# Patient Record
Sex: FEMALE | Race: WHITE | NOT HISPANIC OR LATINO | URBAN - METROPOLITAN AREA
[De-identification: names, ages, dates, MRNs, and addresses within clinical notes are randomized per-mention and may not be internally consistent; named-entity substitution may affect disease eponyms.]

---

## 2018-08-22 ENCOUNTER — OUTPATIENT (OUTPATIENT)
Dept: OUTPATIENT SERVICES | Facility: HOSPITAL | Age: 78
LOS: 1 days | Discharge: HOME | End: 2018-08-22

## 2018-08-22 DIAGNOSIS — M06.4 INFLAMMATORY POLYARTHROPATHY: ICD-10-CM

## 2018-08-22 DIAGNOSIS — Z96.643 PRESENCE OF ARTIFICIAL HIP JOINT, BILATERAL: ICD-10-CM

## 2018-08-22 DIAGNOSIS — M06.9 RHEUMATOID ARTHRITIS, UNSPECIFIED: ICD-10-CM

## 2018-08-22 DIAGNOSIS — K76.9 LIVER DISEASE, UNSPECIFIED: ICD-10-CM

## 2018-08-24 DIAGNOSIS — Z02.9 ENCOUNTER FOR ADMINISTRATIVE EXAMINATIONS, UNSPECIFIED: ICD-10-CM

## 2018-08-24 DIAGNOSIS — M06.4 INFLAMMATORY POLYARTHROPATHY: ICD-10-CM

## 2018-08-24 DIAGNOSIS — M06.9 RHEUMATOID ARTHRITIS, UNSPECIFIED: ICD-10-CM

## 2018-08-24 DIAGNOSIS — K76.9 LIVER DISEASE, UNSPECIFIED: ICD-10-CM

## 2018-12-05 ENCOUNTER — OUTPATIENT (OUTPATIENT)
Dept: OUTPATIENT SERVICES | Facility: HOSPITAL | Age: 78
LOS: 1 days | Discharge: HOME | End: 2018-12-05

## 2018-12-05 DIAGNOSIS — M06.4 INFLAMMATORY POLYARTHROPATHY: ICD-10-CM

## 2018-12-05 DIAGNOSIS — E78.2 MIXED HYPERLIPIDEMIA: ICD-10-CM

## 2018-12-05 DIAGNOSIS — D50.8 OTHER IRON DEFICIENCY ANEMIAS: ICD-10-CM

## 2019-03-19 ENCOUNTER — OUTPATIENT (OUTPATIENT)
Dept: OUTPATIENT SERVICES | Facility: HOSPITAL | Age: 79
LOS: 1 days | Discharge: HOME | End: 2019-03-19

## 2019-03-19 DIAGNOSIS — E78.2 MIXED HYPERLIPIDEMIA: ICD-10-CM

## 2019-03-19 DIAGNOSIS — M06.4 INFLAMMATORY POLYARTHROPATHY: ICD-10-CM

## 2019-03-19 DIAGNOSIS — D50.8 OTHER IRON DEFICIENCY ANEMIAS: ICD-10-CM

## 2019-06-12 ENCOUNTER — OUTPATIENT (OUTPATIENT)
Dept: OUTPATIENT SERVICES | Facility: HOSPITAL | Age: 79
LOS: 1 days | Discharge: HOME | End: 2019-06-12

## 2019-06-12 DIAGNOSIS — M06.4 INFLAMMATORY POLYARTHROPATHY: ICD-10-CM

## 2019-06-12 DIAGNOSIS — E78.2 MIXED HYPERLIPIDEMIA: ICD-10-CM

## 2019-06-12 DIAGNOSIS — D50.8 OTHER IRON DEFICIENCY ANEMIAS: ICD-10-CM

## 2020-05-16 ENCOUNTER — INPATIENT (INPATIENT)
Facility: HOSPITAL | Age: 80
LOS: 3 days | Discharge: HOME HEALTH | End: 2020-05-20
Attending: INTERNAL MEDICINE | Admitting: INTERNAL MEDICINE
Payer: MEDICARE

## 2020-05-16 VITALS
OXYGEN SATURATION: 98 % | HEART RATE: 99 BPM | RESPIRATION RATE: 29 BRPM | DIASTOLIC BLOOD PRESSURE: 58 MMHG | SYSTOLIC BLOOD PRESSURE: 90 MMHG | TEMPERATURE: 98 F

## 2020-05-16 DIAGNOSIS — Z96.659 PRESENCE OF UNSPECIFIED ARTIFICIAL KNEE JOINT: Chronic | ICD-10-CM

## 2020-05-16 DIAGNOSIS — Z96.649 PRESENCE OF UNSPECIFIED ARTIFICIAL HIP JOINT: Chronic | ICD-10-CM

## 2020-05-16 DIAGNOSIS — R09.89 OTHER SPECIFIED SYMPTOMS AND SIGNS INVOLVING THE CIRCULATORY AND RESPIRATORY SYSTEMS: ICD-10-CM

## 2020-05-16 DIAGNOSIS — I26.99 OTHER PULMONARY EMBOLISM WITHOUT ACUTE COR PULMONALE: ICD-10-CM

## 2020-05-16 LAB
ALBUMIN SERPL ELPH-MCNC: 3.7 G/DL — SIGNIFICANT CHANGE UP (ref 3.5–5.2)
ALP SERPL-CCNC: 77 U/L — SIGNIFICANT CHANGE UP (ref 30–115)
ALT FLD-CCNC: 12 U/L — SIGNIFICANT CHANGE UP (ref 0–41)
ANION GAP SERPL CALC-SCNC: 22 MMOL/L — HIGH (ref 7–14)
APTT BLD: 104 SEC — CRITICAL HIGH (ref 27–39.2)
AST SERPL-CCNC: 27 U/L — SIGNIFICANT CHANGE UP (ref 0–41)
BASE EXCESS BLDV CALC-SCNC: -3 MMOL/L — LOW (ref -2–2)
BASE EXCESS BLDV CALC-SCNC: -6 MMOL/L — LOW (ref -2–2)
BASOPHILS # BLD AUTO: 0.11 K/UL — SIGNIFICANT CHANGE UP (ref 0–0.2)
BASOPHILS NFR BLD AUTO: 0.6 % — SIGNIFICANT CHANGE UP (ref 0–1)
BILIRUB SERPL-MCNC: 0.6 MG/DL — SIGNIFICANT CHANGE UP (ref 0.2–1.2)
BUN SERPL-MCNC: 36 MG/DL — HIGH (ref 10–20)
CA-I SERPL-SCNC: 0.96 MMOL/L — LOW (ref 1.12–1.3)
CA-I SERPL-SCNC: 1.3 MMOL/L — SIGNIFICANT CHANGE UP (ref 1.12–1.3)
CALCIUM SERPL-MCNC: 10.7 MG/DL — HIGH (ref 8.5–10.1)
CHLORIDE SERPL-SCNC: 95 MMOL/L — LOW (ref 98–110)
CO2 SERPL-SCNC: 18 MMOL/L — SIGNIFICANT CHANGE UP (ref 17–32)
CREAT SERPL-MCNC: 1.6 MG/DL — HIGH (ref 0.7–1.5)
EOSINOPHIL # BLD AUTO: 0.08 K/UL — SIGNIFICANT CHANGE UP (ref 0–0.7)
EOSINOPHIL NFR BLD AUTO: 0.5 % — SIGNIFICANT CHANGE UP (ref 0–8)
GAS PNL BLDV: 131 MMOL/L — LOW (ref 136–145)
GAS PNL BLDV: 138 MMOL/L — SIGNIFICANT CHANGE UP (ref 136–145)
GAS PNL BLDV: SIGNIFICANT CHANGE UP
GAS PNL BLDV: SIGNIFICANT CHANGE UP
GLUCOSE SERPL-MCNC: 221 MG/DL — HIGH (ref 70–99)
HCO3 BLDV-SCNC: 20 MMOL/L — LOW (ref 22–29)
HCO3 BLDV-SCNC: 23 MMOL/L — SIGNIFICANT CHANGE UP (ref 22–29)
HCT VFR BLD CALC: 40.9 % — SIGNIFICANT CHANGE UP (ref 37–47)
HCT VFR BLDA CALC: 42.6 % — SIGNIFICANT CHANGE UP (ref 34–44)
HCT VFR BLDA CALC: 43 % — SIGNIFICANT CHANGE UP (ref 34–44)
HGB BLD CALC-MCNC: 13.9 G/DL — LOW (ref 14–18)
HGB BLD CALC-MCNC: 14 G/DL — SIGNIFICANT CHANGE UP (ref 14–18)
HGB BLD-MCNC: 13.1 G/DL — SIGNIFICANT CHANGE UP (ref 12–16)
HOROWITZ INDEX BLDV+IHG-RTO: 21 — SIGNIFICANT CHANGE UP
HOROWITZ INDEX BLDV+IHG-RTO: 21 — SIGNIFICANT CHANGE UP
IMM GRANULOCYTES NFR BLD AUTO: 1.5 % — HIGH (ref 0.1–0.3)
LACTATE BLDV-MCNC: 2.8 MMOL/L — HIGH (ref 0.5–1.6)
LACTATE BLDV-MCNC: 5.8 MMOL/L — HIGH (ref 0.5–1.6)
LYMPHOCYTES # BLD AUTO: 0.88 K/UL — LOW (ref 1.2–3.4)
LYMPHOCYTES # BLD AUTO: 5.2 % — LOW (ref 20.5–51.1)
MAGNESIUM SERPL-MCNC: 1.6 MG/DL — LOW (ref 1.8–2.4)
MCHC RBC-ENTMCNC: 31.8 PG — HIGH (ref 27–31)
MCHC RBC-ENTMCNC: 32 G/DL — SIGNIFICANT CHANGE UP (ref 32–37)
MCV RBC AUTO: 99.3 FL — HIGH (ref 81–99)
MONOCYTES # BLD AUTO: 1.26 K/UL — HIGH (ref 0.1–0.6)
MONOCYTES NFR BLD AUTO: 7.4 % — SIGNIFICANT CHANGE UP (ref 1.7–9.3)
NEUTROPHILS # BLD AUTO: 14.45 K/UL — HIGH (ref 1.4–6.5)
NEUTROPHILS NFR BLD AUTO: 84.8 % — HIGH (ref 42.2–75.2)
NRBC # BLD: 0 /100 WBCS — SIGNIFICANT CHANGE UP (ref 0–0)
NT-PROBNP SERPL-SCNC: 3162 PG/ML — HIGH (ref 0–300)
PCO2 BLDV: 43 MMHG — SIGNIFICANT CHANGE UP (ref 41–51)
PCO2 BLDV: 46 MMHG — SIGNIFICANT CHANGE UP (ref 41–51)
PH BLDV: 7.29 — SIGNIFICANT CHANGE UP (ref 7.26–7.43)
PH BLDV: 7.31 — SIGNIFICANT CHANGE UP (ref 7.26–7.43)
PLATELET # BLD AUTO: 198 K/UL — SIGNIFICANT CHANGE UP (ref 130–400)
PO2 BLDV: 12 MMHG — LOW (ref 20–40)
PO2 BLDV: 26 MMHG — SIGNIFICANT CHANGE UP (ref 20–40)
POTASSIUM BLDV-SCNC: 3.9 MMOL/L — SIGNIFICANT CHANGE UP (ref 3.3–5.6)
POTASSIUM BLDV-SCNC: 4.2 MMOL/L — SIGNIFICANT CHANGE UP (ref 3.3–5.6)
POTASSIUM SERPL-MCNC: 4.5 MMOL/L — SIGNIFICANT CHANGE UP (ref 3.5–5)
POTASSIUM SERPL-SCNC: 4.5 MMOL/L — SIGNIFICANT CHANGE UP (ref 3.5–5)
PROT SERPL-MCNC: 6.5 G/DL — SIGNIFICANT CHANGE UP (ref 6–8)
RBC # BLD: 4.12 M/UL — LOW (ref 4.2–5.4)
RBC # FLD: 12.4 % — SIGNIFICANT CHANGE UP (ref 11.5–14.5)
SAO2 % BLDV: 11 % — SIGNIFICANT CHANGE UP
SAO2 % BLDV: 39 % — SIGNIFICANT CHANGE UP
SARS-COV-2 RNA SPEC QL NAA+PROBE: SIGNIFICANT CHANGE UP
SODIUM SERPL-SCNC: 135 MMOL/L — SIGNIFICANT CHANGE UP (ref 135–146)
TROPONIN T SERPL-MCNC: 0.1 NG/ML — CRITICAL HIGH
WBC # BLD: 17.04 K/UL — HIGH (ref 4.8–10.8)
WBC # FLD AUTO: 17.04 K/UL — HIGH (ref 4.8–10.8)

## 2020-05-16 PROCEDURE — 93306 TTE W/DOPPLER COMPLETE: CPT | Mod: 26

## 2020-05-16 PROCEDURE — 71275 CT ANGIOGRAPHY CHEST: CPT | Mod: 26

## 2020-05-16 PROCEDURE — 99223 1ST HOSP IP/OBS HIGH 75: CPT

## 2020-05-16 PROCEDURE — 99291 CRITICAL CARE FIRST HOUR: CPT | Mod: CS

## 2020-05-16 PROCEDURE — 71045 X-RAY EXAM CHEST 1 VIEW: CPT | Mod: 26

## 2020-05-16 RX ORDER — LISINOPRIL 2.5 MG/1
0 TABLET ORAL
Qty: 0 | Refills: 0 | DISCHARGE

## 2020-05-16 RX ORDER — PANTOPRAZOLE SODIUM 20 MG/1
40 TABLET, DELAYED RELEASE ORAL
Refills: 0 | Status: DISCONTINUED | OUTPATIENT
Start: 2020-05-16 | End: 2020-05-20

## 2020-05-16 RX ORDER — HEPARIN SODIUM 5000 [USP'U]/ML
1000 INJECTION INTRAVENOUS; SUBCUTANEOUS
Qty: 25000 | Refills: 0 | Status: DISCONTINUED | OUTPATIENT
Start: 2020-05-16 | End: 2020-05-17

## 2020-05-16 RX ORDER — MAGNESIUM SULFATE 500 MG/ML
2 VIAL (ML) INJECTION ONCE
Refills: 0 | Status: COMPLETED | OUTPATIENT
Start: 2020-05-16 | End: 2020-05-16

## 2020-05-16 RX ORDER — MORPHINE SULFATE 50 MG/1
2 CAPSULE, EXTENDED RELEASE ORAL ONCE
Refills: 0 | Status: DISCONTINUED | OUTPATIENT
Start: 2020-05-16 | End: 2020-05-16

## 2020-05-16 RX ORDER — IPRATROPIUM/ALBUTEROL SULFATE 18-103MCG
3 AEROSOL WITH ADAPTER (GRAM) INHALATION ONCE
Refills: 0 | Status: COMPLETED | OUTPATIENT
Start: 2020-05-16 | End: 2020-05-16

## 2020-05-16 RX ORDER — CHLORHEXIDINE GLUCONATE 213 G/1000ML
1 SOLUTION TOPICAL EVERY 12 HOURS
Refills: 0 | Status: DISCONTINUED | OUTPATIENT
Start: 2020-05-16 | End: 2020-05-20

## 2020-05-16 RX ORDER — AZITHROMYCIN 500 MG/1
500 TABLET, FILM COATED ORAL ONCE
Refills: 0 | Status: COMPLETED | OUTPATIENT
Start: 2020-05-16 | End: 2020-05-16

## 2020-05-16 RX ORDER — ONDANSETRON 8 MG/1
4 TABLET, FILM COATED ORAL ONCE
Refills: 0 | Status: COMPLETED | OUTPATIENT
Start: 2020-05-16 | End: 2020-05-16

## 2020-05-16 RX ORDER — HEPARIN SODIUM 5000 [USP'U]/ML
5000 INJECTION INTRAVENOUS; SUBCUTANEOUS ONCE
Refills: 0 | Status: COMPLETED | OUTPATIENT
Start: 2020-05-16 | End: 2020-05-16

## 2020-05-16 RX ORDER — HYDROCORTISONE 20 MG
100 TABLET ORAL EVERY 8 HOURS
Refills: 0 | Status: DISCONTINUED | OUTPATIENT
Start: 2020-05-16 | End: 2020-05-20

## 2020-05-16 RX ORDER — HEPARIN SODIUM 5000 [USP'U]/ML
INJECTION INTRAVENOUS; SUBCUTANEOUS
Qty: 25000 | Refills: 0 | Status: DISCONTINUED | OUTPATIENT
Start: 2020-05-16 | End: 2020-05-16

## 2020-05-16 RX ORDER — CEFTRIAXONE 500 MG/1
1000 INJECTION, POWDER, FOR SOLUTION INTRAMUSCULAR; INTRAVENOUS ONCE
Refills: 0 | Status: COMPLETED | OUTPATIENT
Start: 2020-05-16 | End: 2020-05-16

## 2020-05-16 RX ORDER — SODIUM CHLORIDE 9 MG/ML
2000 INJECTION, SOLUTION INTRAVENOUS ONCE
Refills: 0 | Status: COMPLETED | OUTPATIENT
Start: 2020-05-16 | End: 2020-05-16

## 2020-05-16 RX ADMIN — Medication 50 GRAM(S): at 06:31

## 2020-05-16 RX ADMIN — Medication 100 MILLIGRAM(S): at 21:23

## 2020-05-16 RX ADMIN — HEPARIN SODIUM 5000 UNIT(S): 5000 INJECTION INTRAVENOUS; SUBCUTANEOUS at 12:00

## 2020-05-16 RX ADMIN — AZITHROMYCIN 255 MILLIGRAM(S): 500 TABLET, FILM COATED ORAL at 08:18

## 2020-05-16 RX ADMIN — CEFTRIAXONE 100 MILLIGRAM(S): 500 INJECTION, POWDER, FOR SOLUTION INTRAMUSCULAR; INTRAVENOUS at 07:45

## 2020-05-16 RX ADMIN — Medication 100 MILLIGRAM(S): at 16:03

## 2020-05-16 RX ADMIN — MORPHINE SULFATE 2 MILLIGRAM(S): 50 CAPSULE, EXTENDED RELEASE ORAL at 06:30

## 2020-05-16 RX ADMIN — SODIUM CHLORIDE 2000 MILLILITER(S): 9 INJECTION, SOLUTION INTRAVENOUS at 05:47

## 2020-05-16 RX ADMIN — ONDANSETRON 4 MILLIGRAM(S): 8 TABLET, FILM COATED ORAL at 19:06

## 2020-05-16 RX ADMIN — Medication 3 MILLILITER(S): at 05:47

## 2020-05-16 RX ADMIN — HEPARIN SODIUM 1100 UNIT(S)/HR: 5000 INJECTION INTRAVENOUS; SUBCUTANEOUS at 12:33

## 2020-05-16 NOTE — H&P ADULT - NSHPLABSRESULTS_GEN_ALL_CORE
Complete Blood Count + Automated Diff (05.16.20 @ 05:34)    WBC Count: 17.04 K/uL    RBC Count: 4.12 M/uL    Hemoglobin: 13.1 g/dL    Hematocrit: 40.9 %    Mean Cell Volume: 99.3 fL    Mean Cell Hemoglobin: 31.8 pg    Mean Cell Hemoglobin Conc: 32.0 g/dL    Red Cell Distrib Width: 12.4 %    Platelet Count - Automated: 198 K/uL    Auto Neutrophil #: 14.45 K/uL    Auto Lymphocyte #: 0.88 K/uL    Auto Monocyte #: 1.26 K/uL    Auto Eosinophil #: 0.08 K/uL    Auto Basophil #: 0.11 K/uL    Auto Neutrophil %: 84.8: Differential percentages must be correlated with absolute numbers for  clinical significance. %    Auto Lymphocyte %: 5.2 %    Auto Monocyte %: 7.4 %    Auto Eosinophil %: 0.5 %    Auto Basophil %: 0.6 %    Auto Immature Granulocyte %: 1.5 %    Nucleated RBC: 0 /100 WBCs    Comprehensive Metabolic Panel (05.16.20 @ 05:34)    Sodium, Serum: 135 mmol/L    Potassium, Serum: 4.5 mmol/L    Chloride, Serum: 95 mmol/L    Carbon Dioxide, Serum: 18 mmol/L    Anion Gap, Serum: 22 mmol/L    Blood Urea Nitrogen, Serum: 36 mg/dL    Creatinine, Serum: 1.6 mg/dL    Glucose, Serum: 221 mg/dL    Calcium, Total Serum: 10.7 mg/dL    Protein Total, Serum: 6.5 g/dL    Albumin, Serum: 3.7 g/dL    Bilirubin Total, Serum: 0.6 mg/dL    Alkaline Phosphatase, Serum: 77 U/L    Aspartate Aminotransferase (AST/SGOT): 27 U/L    Alanine Aminotransferase (ALT/SGPT): 12 U/L    eGFR if Non : 30: Interpretative comment  The units for eGFR are mL/min/1.73M2 (normalized body surface area). The  eGFR is calculated from a serum creatinine using the CKD-EPI equation.  Other variables required for calculation are race, age and sex. Among  patients with chronic kidney disease (CKD), the eGFR is useful in  determining the stage of disease according to KDOQI CKD classification.  All eGFR results are reported numerically with the following  interpretation.          GFR                    With                 Without     (ml/min/1.73 m2)    Kidney Damage       Kidney Damage        >= 90                    Stage 1                     Normal        60-89                    Stage 2                     Decreased GFR        30-59     Stage 3                     Stage 3        15-29                    Stage 4                     Stage 4        < 15                      Stage 5                     Stage 5  Each stage of CKD assumes that the associated GFR level has been in  effect for at least 3 months. Determination of stages one and two (with  eGFR > 59 ml/min/m2) requires estimation of kidney damage for at least 3  months as defined by structural or functional abnormalities.  Limitations: All estimates of GFR will be less accurate for patients at  extremes of muscle mass (including but not limited to frail elderly,  critically ill, or cancer patients), those with unusual diets, and those  with conditions associated with reduced secretion or extrarenal  elimination of creatinine. The eGFR equation is not recommended for use  in patients with unstable creatinine levels. mL/min/1.73M2    eGFR if African American: 35 mL/min/1.73M2    < from: CT Angio Chest w/ IV Cont (05.16.20 @ 09:56) >    Impression:    Acute bilateral pulmonary emboli most notable within the distal portion of right main pulmonary artery, proximal left lower lobe pulmonary arteries    Severe right heart strain. RV LV ratio = 3.2.    Extensive mediastinal, bilateral hilar and right axillary adenopathy.    Small bilateral pleural effusions with interlobular septal thickening. 11 mm right upper lobe pulmonary nodule (3/167)    < end of copied text >

## 2020-05-16 NOTE — ED ADULT NURSE NOTE - CHIEF COMPLAINT QUOTE
Pt brought in by ambulance from home (notification for respiratory distress). As per son "She is currently undergoing immunotherapy for lymphoma which has spread to the lungs, liver and bone. She started a more aggressive treatment on Monday at [facility]. Tonight she developed trouble breathing. She was feeling hot although she was cool to the touch. She took a 5 mg Oxycodone tab for pain tonight."

## 2020-05-16 NOTE — ED PROVIDER NOTE - PHYSICAL EXAMINATION
CONST: NAD  EYES: Sclera and conjunctiva clear.   ENT: No nasal discharge. Oropharynx normal appearing  NECK: Non-tender, no meningeal signs. normal ROM. supple   CARD: S1 S2; No jvd  RESP: Equal BS B/L, No wheezes, rhonchi or rales. O2sat 94%ra  GI: Soft, non-tender, non-distended. normal BS  MS: Normal ROM in all extremities. pulses 2 +. no calf tenderness or swelling  SKIN: Warm, dry, no acute rashes. Good turgor  NEURO: A&Ox4

## 2020-05-16 NOTE — H&P ADULT - HISTORY OF PRESENT ILLNESS
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 Pt had moderate generalized body aches today with sob, took 5mg oxycodone with relief of body aches but increased sob leading to son calling EMS. As per son pt has episodes of hypotension since being treated for CA. 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 Pt endorses she had cancer infusion last Monday at [facility] and the next day she has been having nausea and dyspnea at rest exacerbated by exertion. patient denies fever, chills, chest pain, abdominal pain, cough, or change in bowel or urinary habits.     in the ED vital signs: T(F): Max: 98.5, HR: 100, BP: 123/65, RR: 25, SpO2: 96% on 2 L NC.  patient received 2 L LR, azithro, Rocephin Mg and morphine.   CTA chest with Acute bilateral pulmonary emboli within the distal portion of right main pulmonary artery, proximal left lower lobe pulmonary arterie and with evidence of Severe right heart strain. RV LV ratio = 3.2.  patient was approved for MICU admission.

## 2020-05-16 NOTE — ED PROVIDER NOTE - CLINICAL SUMMARY MEDICAL DECISION MAKING FREE TEXT BOX
80 female here for acute onset of dyspnea and chest pain. Had screening labs imaging cardiac monitoring medications and reevaluation, plan is for inpatient admission to monitored setting for continued management.  Case d/w ICU, plan is anticoagulation in ED (GFR low will give heparin) and [place] admission for possible IR involvement

## 2020-05-16 NOTE — ED PROVIDER NOTE - ATTENDING CONTRIBUTION TO CARE
I personally evaluated the patient. I reviewed the Resident’s or Physician Assistant’s note (as assigned above), and agree with the findings and plan except as documented in my note.  Chart reviewed. H/O Stage 4 lymphoma on immunotherapy, followed at [facility], , presents with SOB and diffuse body pains tonight. Denies fever, cough or chest pain. Took oxycodone for pain. Exam shows alert patient in no distress, HEENT NCAT, lungs clear, RR S1S2, abdomen soft NT +BS, no CCE. I personally evaluated the patient. I reviewed the Resident’s or Physician Assistant’s note (as assigned above), and agree with the findings and plan except as documented in my note.  Chart reviewed. H/O Stage 4 lymphoma on immunotherapy, followed at [facility], , presents with SOB and diffuse body pains tonight. Denies fever, cough or chest pain. Took oxycodone for pain. Son states she has had poor PO intake. Exam shows alert patient in no distress, HEENT NCAT, lungs clear, RR S1S2, abdomen soft NT +BS, no CCE. Will order O2, IVF, labs, EKG, CXR and re-assess.

## 2020-05-16 NOTE — ED ADULT NURSE REASSESSMENT NOTE - NS ED NURSE REASSESS COMMENT FT1
P tin CT at this time.
pt transferred to [facility] from [facility], admitted to ICU bed 127. VSS. pt alert, oriented. no respiratory distress noted. pt transferred to ICU from ED triage
pt transferred north to ICU, report given to [facility] ICU, report given to ems, pt safely handed off to ems

## 2020-05-16 NOTE — CONSULT NOTE ADULT - SUBJECTIVE AND OBJECTIVE BOX
Patient is a 80y old  Female who presents with a chief complaint of     HPI: pt w sudden onset of sob last evening. Denies chest pain, palpitations, -cough/fever chills. Pt w Metastatic lymphoma, undergoing treatment at [facility].  Most recent infusion was this past monday.  Denies leg pain      PAST MEDICAL & SURGICAL HISTORY:  Rheumatoid arthritis  Lymphoma: mets to lungs, liver and bone  H/O total knee replacement  History of hip replacement    Allergies    No Known Allergies    Intolerances      FAMILY HISTORY:    Home Medications:  lisinopril:  (16 May 2020 05:43)  methotrexate: 8 pills once a week (16 May 2020 05:43)  oxyCODONE 5 mg oral tablet:  (16 May 2020 05:43)  predniSONE:  (16 May 2020 05:43)  Zofran:  (16 May 2020 05:43)    Occupation:  Alochol: Denied  Smoking: Denied  Drug Use: Denied  Marital Status:         ROS: as in HPI; All other systems reviewed are negative    ICU Vital Signs Last 24 Hrs  T(C): 36.9 (16 May 2020 05:16), Max: 36.9 (16 May 2020 05:16)  T(F): 98.5 (16 May 2020 05:16), Max: 98.5 (16 May 2020 05:16)  HR: 98 (16 May 2020 09:56) (94 - 100)  BP: 122/66 (16 May 2020 09:56) (90/58 - 123/65)  BP(mean): --  ABP: --  ABP(mean): --  RR: 22 (16 May 2020 09:56) (22 - 29)  SpO2: 98% (16 May 2020 09:56) (98% - 100%)        Physical Examination:    General: No acute distress.  Alert, oriented, interactive, nonfocal    HEENT: Pupils equal, reactive to light.  Symmetric.    PULM: Clear to auscultation bilaterally, no significant sputum production    CVS: Regular rate and rhythm, no murmurs, rubs, or gallops    ABD: Soft, nondistended, nontender, normoactive bowel sounds, no masses    EXT: No edema, nontender    SKIN: Warm and well perfused, no rashes noted.              I&O's Detail        LABS:                        13.1   17.04 )-----------( 198      ( 16 May 2020 05:34 )             40.9     16 May 2020 05:34    135    |  95     |  36     ----------------------------<  221    4.5     |  18     |  1.6      Ca    10.7       16 May 2020 05:34  Mg     1.6       16 May 2020 05:34    TPro  6.5    /  Alb  3.7    /  TBili  0.6    /  DBili  x      /  AST  27     /  ALT  12     /  AlkPhos  77     16 May 2020 05:34  Amylase x     lipase x          CARDIAC MARKERS ( 16 May 2020 05:34 )  x     / 0.10 ng/mL / x     / x     / x          CAPILLARY BLOOD GLUCOSE            Culture        MEDICATIONS  (STANDING):  heparin   Injectable 5000 Unit(s) IV Push once  heparin  Infusion.  Unit(s)/Hr (11 mL/Hr) IV Continuous <Continuous>    MEDICATIONS  (PRN):        RADIOLOGY:     Impression:    Acute bilateral pulmonary emboli most notable within the distal portion of right main pulmonary artery, proximal left lower lobe pulmonary arteries    Severe right heart strain. RV LV ratio = 3.2.    Extensive mediastinal, bilateral hilar and right axillary adenopathy.    Small bilateral pleural effusions with interlobular septal thickening. 11 mm right upper lobe pulmonary nodule (3/167)          IMPRESSION/PLAN:    Submassive b/l PE  Metastatic Lymphoma  HTN    Pt w/shortness of breath found to have b/l PE w severe R heart strain.  In light of pt's metastatic disease, there is concern regarding tPA adminsitration.  Pt given AC and to be presented to IR for possible intervention.  Obtain 2d echo. At this time, it was discussed w/ the Pulmonary Fellow at [facility] who discussed with the Pulm attending, they agree with transfer [place] for further management.       d/w Pt's  Roman, he is aware of pt's diagnosis and that she will be transferred to [facility] for further management.      CRITICAL CARE TIME SPENT: 60 minutes

## 2020-05-16 NOTE — H&P ADULT - ASSESSMENT
Impression :        Plan-   CNS :    HEENT:Oral Care    Pulmonary: HOB @ 45 degrees                         Vent     Cardiovascular :                              CE                             Keep MAP >65     GI : GI prophylaxis          DIET-     Renal : follow up lytes, correct as needed    Infectious Disease : Antibiotics -                                     Follow up cultures ; descelate when cultures back    Hematological : DVT ppx -     Endocrine :    Musculoskeletal : Bedrest    Admit to ICU    Code status -  Prognosis Impression :  Intermediate-High Risk PE (Positive troponin + RV strain on CT)  RA on prednisone and methotrexate  Stage 4 lymphoma on immunotherapy  HTN    Plan-   CNS: avoid CNS depressant     HEENT: Oral Care    Pulmonary: HOB @ 45 degrees, Keep SaO2 >92%, heparin drip and f/u PTT    Cardiovascular: Keep MAP >65, repeat Troponin, 2D echo, start hydrocortisone 100 mg q 8 hrs    GI : GI prophylaxis, start feeding     Renal : follow up lytes, correct as needed    Infectious Disease : Follow up cultures ; monitor off ABx    Hematological : on heparin     Endocrine : insulin protocol if needed     Musculoskeletal : Bedrest    Admit to ICU  Code status: Full

## 2020-05-16 NOTE — ED PROVIDER NOTE - NS ED ROS FT
Constitutional: (+) body aches, (-) fever  Eyes/ENT: (-) blurry vision, (-) epistaxis  Cardiovascular: (-) chest pain, (-) syncope  Respiratory: (+) shortness of breath, (-) cough  Gastrointestinal: (-) vomiting, (-) diarrhea  : (-) dysuria, (-) hematuria  Musculoskeletal: (-) neck pain, (-) back pain, (-) joint pain  Integumentary: (-) rash, (-) edema  Neurological: (-) headache, (-) altered mental status  Allergic/Immunologic: (-) pruritus

## 2020-05-16 NOTE — ED PROVIDER NOTE - CARE PLAN
Principal Discharge DX:	Acute pulmonary embolism with acute cor pulmonale, unspecified pulmonary embolism type

## 2020-05-16 NOTE — ED PROVIDER NOTE - OBJECTIVE STATEMENT
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 Pt had moderate generalized body aches today with sob, took 5mg oxycodone with relief of body aches but increased sob leading to son calling EMS. As per son pt has episodes of hypotension since being treated for CA. Denies fever, ha, cp, cough, numbness, dysuria, hematuria, n/v/d/c

## 2020-05-16 NOTE — ED PROVIDER NOTE - CRITICAL CARE PROVIDED
documentation/consult w/ pt's family directly relating to pts condition/interpretation of diagnostic studies/consultation with other physicians/additional history taking/direct patient care (not related to procedure)/conducted a detailed discussion of DNR status/telephone consultation with the patient's family

## 2020-05-16 NOTE — ED ADULT TRIAGE NOTE - CHIEF COMPLAINT QUOTE
Pt brought in by ambulance from home (notification for respiratory distress). As per son "She is currently undergoing immunotherapy for lymphoma which has spread to the lungs, liver and bone. She started a more aggressive treatment on Monday at [facility]. Tonight she developed trouble breathing. She was feeling hot although she was cool to the touch. She took a 5 mg Oxycodone tab for pain tonight."

## 2020-05-16 NOTE — ED ADULT NURSE NOTE - NSIMPLEMENTINTERV_GEN_ALL_ED
Implemented All Fall Risk Interventions:  [place] to call system. Call bell, personal items and telephone within reach. Instruct patient to call for assistance. Room bathroom lighting operational. Non-slip footwear when patient is off stretcher. Physically safe environment: no spills, clutter or unnecessary equipment. Stretcher in lowest position, wheels locked, appropriate side rails in place. Provide visual cue, wrist band, yellow gown, etc. Monitor gait and stability. Monitor for mental status changes and reorient to person, place, and time. Review medications for side effects contributing to fall risk. Reinforce activity limits and safety measures with patient and family.

## 2020-05-16 NOTE — ED PROVIDER NOTE - PROGRESS NOTE DETAILS
Son: Nelson [phone] Labs reviewed benefit of CTA decided despite kidney function Labs reviewed benefit of CTA decided despite kidney function. Elevated Cr/GFR likely due to dehydration.  will hydrate. Signed out to Dr. Hdz. CTA pending.

## 2020-05-17 LAB
ANION GAP SERPL CALC-SCNC: 18 MMOL/L — HIGH (ref 7–14)
APTT BLD: 87.4 SEC — CRITICAL HIGH (ref 27–39.2)
APTT BLD: 90.6 SEC — CRITICAL HIGH (ref 27–39.2)
BASOPHILS # BLD AUTO: 0.03 K/UL — SIGNIFICANT CHANGE UP (ref 0–0.2)
BASOPHILS NFR BLD AUTO: 0.2 % — SIGNIFICANT CHANGE UP (ref 0–1)
BUN SERPL-MCNC: 36 MG/DL — HIGH (ref 10–20)
CALCIUM SERPL-MCNC: 9.8 MG/DL — SIGNIFICANT CHANGE UP (ref 8.5–10.1)
CHLORIDE SERPL-SCNC: 100 MMOL/L — SIGNIFICANT CHANGE UP (ref 98–110)
CO2 SERPL-SCNC: 18 MMOL/L — SIGNIFICANT CHANGE UP (ref 17–32)
CREAT SERPL-MCNC: 1.5 MG/DL — SIGNIFICANT CHANGE UP (ref 0.7–1.5)
EOSINOPHIL # BLD AUTO: 0.01 K/UL — SIGNIFICANT CHANGE UP (ref 0–0.7)
EOSINOPHIL NFR BLD AUTO: 0.1 % — SIGNIFICANT CHANGE UP (ref 0–8)
GLUCOSE SERPL-MCNC: 111 MG/DL — HIGH (ref 70–99)
HCT VFR BLD CALC: 40.5 % — SIGNIFICANT CHANGE UP (ref 37–47)
HGB BLD-MCNC: 13.5 G/DL — SIGNIFICANT CHANGE UP (ref 12–16)
IMM GRANULOCYTES NFR BLD AUTO: 0.8 % — HIGH (ref 0.1–0.3)
LYMPHOCYTES # BLD AUTO: 0.31 K/UL — LOW (ref 1.2–3.4)
LYMPHOCYTES # BLD AUTO: 2 % — LOW (ref 20.5–51.1)
MAGNESIUM SERPL-MCNC: 2 MG/DL — SIGNIFICANT CHANGE UP (ref 1.8–2.4)
MCHC RBC-ENTMCNC: 32.6 PG — HIGH (ref 27–31)
MCHC RBC-ENTMCNC: 33.3 G/DL — SIGNIFICANT CHANGE UP (ref 32–37)
MCV RBC AUTO: 97.8 FL — SIGNIFICANT CHANGE UP (ref 81–99)
MONOCYTES # BLD AUTO: 0.92 K/UL — HIGH (ref 0.1–0.6)
MONOCYTES NFR BLD AUTO: 5.9 % — SIGNIFICANT CHANGE UP (ref 1.7–9.3)
NEUTROPHILS # BLD AUTO: 14.25 K/UL — HIGH (ref 1.4–6.5)
NEUTROPHILS NFR BLD AUTO: 91 % — HIGH (ref 42.2–75.2)
NRBC # BLD: 0 /100 WBCS — SIGNIFICANT CHANGE UP (ref 0–0)
PLATELET # BLD AUTO: 189 K/UL — SIGNIFICANT CHANGE UP (ref 130–400)
POTASSIUM SERPL-MCNC: 5 MMOL/L — SIGNIFICANT CHANGE UP (ref 3.5–5)
POTASSIUM SERPL-SCNC: 5 MMOL/L — SIGNIFICANT CHANGE UP (ref 3.5–5)
RBC # BLD: 4.14 M/UL — LOW (ref 4.2–5.4)
RBC # FLD: 12.3 % — SIGNIFICANT CHANGE UP (ref 11.5–14.5)
SODIUM SERPL-SCNC: 136 MMOL/L — SIGNIFICANT CHANGE UP (ref 135–146)
WBC # BLD: 15.65 K/UL — HIGH (ref 4.8–10.8)
WBC # FLD AUTO: 15.65 K/UL — HIGH (ref 4.8–10.8)

## 2020-05-17 PROCEDURE — 74177 CT ABD & PELVIS W/CONTRAST: CPT | Mod: 26

## 2020-05-17 PROCEDURE — 93970 EXTREMITY STUDY: CPT | Mod: 26

## 2020-05-17 PROCEDURE — 71045 X-RAY EXAM CHEST 1 VIEW: CPT | Mod: 26

## 2020-05-17 RX ORDER — HEPARIN SODIUM 5000 [USP'U]/ML
900 INJECTION INTRAVENOUS; SUBCUTANEOUS
Qty: 25000 | Refills: 0 | Status: DISCONTINUED | OUTPATIENT
Start: 2020-05-17 | End: 2020-05-18

## 2020-05-17 RX ORDER — SODIUM CHLORIDE 9 MG/ML
1000 INJECTION INTRAMUSCULAR; INTRAVENOUS; SUBCUTANEOUS
Refills: 0 | Status: DISCONTINUED | OUTPATIENT
Start: 2020-05-17 | End: 2020-05-18

## 2020-05-17 RX ORDER — ONDANSETRON 8 MG/1
4 TABLET, FILM COATED ORAL ONCE
Refills: 0 | Status: COMPLETED | OUTPATIENT
Start: 2020-05-17 | End: 2020-05-17

## 2020-05-17 RX ORDER — SENNA PLUS 8.6 MG/1
2 TABLET ORAL AT BEDTIME
Refills: 0 | Status: DISCONTINUED | OUTPATIENT
Start: 2020-05-17 | End: 2020-05-20

## 2020-05-17 RX ORDER — HEPARIN SODIUM 5000 [USP'U]/ML
1000 INJECTION INTRAVENOUS; SUBCUTANEOUS
Qty: 25000 | Refills: 0 | Status: DISCONTINUED | OUTPATIENT
Start: 2020-05-17 | End: 2020-05-17

## 2020-05-17 RX ORDER — ONDANSETRON 8 MG/1
4 TABLET, FILM COATED ORAL EVERY 6 HOURS
Refills: 0 | Status: DISCONTINUED | OUTPATIENT
Start: 2020-05-17 | End: 2020-05-20

## 2020-05-17 RX ORDER — IOHEXOL 300 MG/ML
30 INJECTION, SOLUTION INTRAVENOUS ONCE
Refills: 0 | Status: COMPLETED | OUTPATIENT
Start: 2020-05-17 | End: 2020-05-17

## 2020-05-17 RX ORDER — HEPARIN SODIUM 5000 [USP'U]/ML
10 INJECTION INTRAVENOUS; SUBCUTANEOUS
Qty: 25000 | Refills: 0 | Status: DISCONTINUED | OUTPATIENT
Start: 2020-05-17 | End: 2020-05-17

## 2020-05-17 RX ADMIN — CHLORHEXIDINE GLUCONATE 1 APPLICATION(S): 213 SOLUTION TOPICAL at 06:44

## 2020-05-17 RX ADMIN — Medication 100 MILLIGRAM(S): at 06:43

## 2020-05-17 RX ADMIN — CHLORHEXIDINE GLUCONATE 1 APPLICATION(S): 213 SOLUTION TOPICAL at 19:11

## 2020-05-17 RX ADMIN — SENNA PLUS 2 TABLET(S): 8.6 TABLET ORAL at 22:15

## 2020-05-17 RX ADMIN — IOHEXOL 30 MILLILITER(S): 300 INJECTION, SOLUTION INTRAVENOUS at 17:31

## 2020-05-17 RX ADMIN — Medication 100 MILLIGRAM(S): at 22:15

## 2020-05-17 RX ADMIN — Medication 100 MILLIGRAM(S): at 13:38

## 2020-05-17 RX ADMIN — PANTOPRAZOLE SODIUM 40 MILLIGRAM(S): 20 TABLET, DELAYED RELEASE ORAL at 09:15

## 2020-05-17 NOTE — PROGRESS NOTE ADULT - SUBJECTIVE AND OBJECTIVE BOX
Hospital Day:  1d    Subjective:    Patient is a 80y old  Female who presents with a chief complaint of dyspnea (17 May 2020 10:36)  This morning patient is lying in bed comfortably. She reports no new complaints, including SOB, chest pain, abdominal pain, nausea, vomiting, dysuria, constipation, or diarrhea.      Past Medical Hx:   Rheumatoid arthritis  Lymphoma    Past Sx:  H/O total knee replacement  History of hip replacement    Allergies:  No Known Allergies    Current Meds:   Stand Meds:  chlorhexidine 4% Liquid 1 Application(s) Topical every 12 hours  heparin  Infusion 900 Unit(s)/Hr (9 mL/Hr) IV Continuous <Continuous>  hydrocortisone sodium succinate Injectable 100 milliGRAM(s) IV Push every 8 hours  pantoprazole    Tablet 40 milliGRAM(s) Oral before breakfast    PRN Meds:    HOME MEDICATIONS:  lisinopril 20 mg oral tablet: 1 tab(s) orally once a day  methotrexate: 8 pills once a week  metoprolol succinate 50 mg oral tablet, extended release: 1 tab(s) orally once a day  oxyCODONE 5 mg oral tablet:   predniSONE 10 mg oral tablet: 1 tab(s) orally once a day  Zofran:       Vital Signs:   T(F): 97.6 (05-17-20 @ 12:00), Max: 97.8 (05-16-20 @ 20:00)  HR: 74 (05-17-20 @ 13:00) (68 - 92)  BP: 132/74 (05-17-20 @ 13:00) (95/79 - 140/83)  RR: 22 (05-17-20 @ 13:00) (16 - 37)  SpO2: 95% (05-17-20 @ 13:00) (86% - 97%)      05-16-20 @ 07:01  -  05-17-20 @ 07:00  --------------------------------------------------------  IN: 185 mL / OUT: 500 mL / NET: -315 mL    05-17-20 @ 07:01  -  05-17-20 @ 14:52  --------------------------------------------------------  IN: 54 mL / OUT: 0 mL / NET: 54 mL        Physical Exam:   GENERAL: NAD  HEENT: NCAT  CHEST/LUNG: CTAB  HEART: Regular rate and rhythm; s1 s2 appreciated, No murmurs, rubs, or gallops  ABDOMEN: Soft, Nontender, Nondistended; Bowel sounds present  EXTREMITIES: No LE edema b/l  SKIN: no rashes, no new lesions  NERVOUS SYSTEM:  Alert & Oriented X3  LINES/CATHETERS:        Labs:                         13.5   15.65 )-----------( 189      ( 17 May 2020 04:48 )             40.5     Neutophil% 91.0, Lymphocyte% 2.0, Monocyte% 5.9, Bands% 0.8 05-17-20 @ 04:48    17 May 2020 04:48    136    |  100    |  36     ----------------------------<  111    5.0     |  18     |  1.5      Ca    9.8        17 May 2020 04:48  Mg     2.0       17 May 2020 04:48    TPro  6.5    /  Alb  3.7    /  TBili  0.6    /  DBili  x      /  AST  27     /  ALT  12     /  AlkPhos  77     16 May 2020 05:34       pTT    90.6             ----< -- INR  (05-17-20 @ 04:48)    --        PT,    pTT    87.4             ----< -- INR  (05-17-20 @ 00:19)    --        PT,    pTT    104.0             ----< -- INR  (05-16-20 @ 19:24)    --        PT        Serum Pro-Brain Natriuretic Peptide: 3162 pg/mL (05-16-20 @ 05:34)    Troponin 0.10, CKMB --, CK -- 05-16-20 @ 05:34                    Assessment and Plan:   80 year old Female RA on prednisone and methotraxate, 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    # Acute hypoxemic respiratory failure 2/2 PE  - Currently 98% on 2L  - CTA chest with Acute bilateral pulmonary emboli within the distal portion of right main pulmonary artery, proximal left lower lobe pulmonary arteries and with evidence of Severe right heart strain. RV LV ratio = 3.2  - Continue heparin drip, will likely bridge to eliquis on 5/18. Will likely need to be on AC for life  - Per IR no embelectomy. Please obtain ct a/p as well to r/o mechanical venous compression/obstruction    # Right LE DVT  - U/s shows deep venous thrombosis of the right common femoral, superficial femoral and popliteal vein  - IR will place IVC filter on 5/18  - NPO after midnight    # Stage 4 lymphoma with mets to lung  - Continue home meds    # RA  - Continue methotrxate. Prednisone changed to hydrocortisone to give stress dose    # HTN  - Continue home meds      DVT ppx: heparin drip  DIet: DASH  Dispo: from home, acute  FULL CODE

## 2020-05-17 NOTE — CONSULT NOTE ADULT - SUBJECTIVE AND OBJECTIVE BOX
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 Pt endorses she had cancer infusion last Monday at [facility] and the next day she has been having nausea and dyspnea at rest exacerbated by exertion. patient denies fever, chills, chest pain, abdominal pain, cough, or change in bowel or urinary habits.     in the ED vital signs: T(F): Max: 98.5, HR: 100, BP: 123/65, RR: 25, SpO2: 96% on 2 L NC.  patient received 2 L LR, azithro, Rocephin Mg and morphine.   CTA chest with Acute bilateral pulmonary emboli within the distal portion of right main pulmonary artery, proximal left lower lobe pulmonary arterie and with evidence of Severe right heart strain. RV LV ratio = 3.2.  patient was approved for MICU admission. (16 May 2020 14:26)      PAST MEDICAL & SURGICAL HISTORY:  Rheumatoid arthritis  Lymphoma: mets to lungs, liver and bone  H/O total knee replacement  History of hip replacement      MEDICATIONS  (STANDING):  chlorhexidine 4% Liquid 1 Application(s) Topical every 12 hours  heparin  Infusion 1000 Unit(s)/Hr (9 mL/Hr) IV Continuous <Continuous>  hydrocortisone sodium succinate Injectable 100 milliGRAM(s) IV Push every 8 hours  pantoprazole    Tablet 40 milliGRAM(s) Oral before breakfast    MEDICATIONS  (PRN):      Allergies    No Known Allergies    Intolerances        FAMILY HISTORY:      Physical Exam:   Vital Signs Last 24 Hrs  T(C): 36.3 (17 May 2020 08:00), Max: 36.6 (16 May 2020 20:00)  T(F): 97.3 (17 May 2020 08:00), Max: 97.8 (16 May 2020 20:00)  HR: 76 (17 May 2020 10:00) (68 - 92)  BP: 110/66 (17 May 2020 10:00) (95/79 - 140/83)  BP(mean): 80 (17 May 2020 10:00) (67 - 101)  RR: 25 (17 May 2020 10:00) (16 - 37)  SpO2: 96% (17 May 2020 10:00) (86% - 97%)    General:     Lungs:    Cardiovascular:     Abdomen:    Extremities:    Musculoskeletal:    Neuro/Psych:        Labs:                         13.5   15.65 )-----------( 189      ( 17 May 2020 04:48 )             40.5     05-17    136  |  100  |  36<H>  ----------------------------<  111<H>  5.0   |  18  |  1.5    Ca    9.8      17 May 2020 04:48  Mg     2.0     05-17    TPro  6.5  /  Alb  3.7  /  TBili  0.6  /  DBili  x   /  AST  27  /  ALT  12  /  AlkPhos  77  05-16    PTT - ( 17 May 2020 04:48 )  PTT:90.6 sec    Pertinent labs:                      13.5   15.65 )-----------( 189      ( 17 May 2020 04:48 )             40.5       05-17    136  |  100  |  36<H>  ----------------------------<  111<H>  5.0   |  18  |  1.5    Ca    9.8      17 May 2020 04:48  Mg     2.0     05-17    TPro  6.5  /  Alb  3.7  /  TBili  0.6  /  DBili  x   /  AST  27  /  ALT  12  /  AlkPhos  77  05-16      PTT - ( 17 May 2020 04:48 )  PTT:90.6 sec    Radiology & Additional Studies:     Radiology imaging reviewed.       ASSESSMENT/ PLAN:     Lymphoma/ ? melanoma  high risk submassive PE  extensive LE DVT  anticoagulation  patient HD stable  plan for IVC filter tomorrow  keep npo after midnight  will consider lysis at same time  needs CT head to r/o intracranial mets  please obtain ct a/p as well to r/o mechanical venous compression/obstruction

## 2020-05-17 NOTE — CONSULT NOTE ADULT - SUBJECTIVE AND OBJECTIVE BOX
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 Pt endorses she had cancer infusion last Monday at [facility] [place] and the next day she has been having nausea and dyspnea at rest exacerbated by exertion. patient denies fever, chills, chest pain, abdominal pain, cough, or change in bowel or urinary habits.     in the ED vital signs: T(F): Max: 98.5, HR: 100, BP: 123/65, RR: 25, SpO2: 96% on 2 L NC.  patient received 2 L LR, azithro, Rocephin Mg and morphine.   CTA chest with Acute bilateral pulmonary emboli within the distal portion of right main pulmonary artery, proximal left lower lobe pulmonary arterie and with evidence of Severe right heart strain. RV LV ratio = 3.2.  patient was approved for MICU admission. (16 May 2020 14:26)      PAST MEDICAL & SURGICAL HISTORY:  Rheumatoid arthritis  Lymphoma: mets to lungs, liver and bone  H/O total knee replacement  History of hip replacement      SOCIAL HX:   Smoking           No               ETOH                            Other    FAMILY HISTORY:  :  No known cardiovacular family hisotry     Review Of Systems:     All ROS are negative except per HPI       Allergies    No Known Allergies    Intolerances          PHYSICAL EXAM    ICU Vital Signs Last 24 Hrs  T(C): 36.3 (17 May 2020 08:00), Max: 36.6 (16 May 2020 20:00)  T(F): 97.3 (17 May 2020 08:00), Max: 97.8 (16 May 2020 20:00)  HR: 80 (17 May 2020 08:00) (68 - 98)  BP: 104/58 (17 May 2020 08:00) (95/79 - 140/83)  BP(mean): 67 (17 May 2020 08:00) (67 - 101)  ABP: --  ABP(mean): --  RR: 23 (17 May 2020 08:00) (16 - 37)  SpO2: 97% (17 May 2020 08:00) (86% - 98%)      CONSTITUTIONAL:    Well nourished.  NAD    ENT:   Airway patent,   Mouth with normal mucosa.   No thrush    EYES:   pupils equal,   round and reactive to light.    CARDIAC:   Normal rate,   Regular rhythm.    Heart sounds S1, S2.   No edema      Vascular:   normal systolic impulse  no bruits    RESPIRATORY:   No wheezing   Normal chest expansion  No use of accessory muscles    GASTROINTESTINAL:  Abdomen soft   Non-tender,   No guarding,   + BS    GENITOURINARY  normal genitalia for sex  no edema    MUSCULOSKELETAL:   Range of motion is not limited,  Nno clubbing, cyanosis    NEUROLOGICAL:   Alert and oriented   No motor or sensory deficits.    SKIN:   Skin normal color for race,   Warm and dry  No evidence of rash.    PSYCHIATRIC:   Normal mood and affect.   No apparent risk to self or others.    HEME LYMPH:   No cervical  lymphadenopathy.  No inguinal lymphadenopathy            05-16-20 @ 07:01  -  05-17-20 @ 07:00  --------------------------------------------------------  IN:    heparin  Infusion.: 77 mL    heparin Infusion: 108 mL  Total IN: 185 mL    OUT:    Voided: 500 mL  Total OUT: 500 mL    Total NET: -315 mL      05-17-20 @ 07:01  -  05-17-20 @ 08:28  --------------------------------------------------------  IN:    heparin Infusion: 9 mL  Total IN: 9 mL    OUT:  Total OUT: 0 mL    Total NET: 9 mL          LABS:                          13.5   15.65 )-----------( 189      ( 17 May 2020 04:48 )             40.5                                               05-17    136  |  100  |  36<H>  ----------------------------<  111<H>  5.0   |  18  |  1.5    17 May 2020 04:48    136    |  100    |  36<H>  ----------------------------<  111<H>  5.0     |  18     |  1.5    16 May 2020 05:34    135    |  95<L>  |  36<H>  ----------------------------<  221<H>  4.5     |  18     |  1.6<H>    Ca    9.8        17 May 2020 04:48  Ca    10.7<H>      16 May 2020 05:34  Mg     2.0       17 May 2020 04:48  Mg     1.6<L>     16 May 2020 05:34    TPro  6.5    /  Alb  3.7    /  TBili  0.6    /  DBili  x      /  AST  27     /  ALT  12     /  AlkPhos  77     16 May 2020 05:34      Ca    9.8      17 May 2020 04:48  Mg     2.0     05-17    TPro  6.5  /  Alb  3.7  /  TBili  0.6  /  DBili  x   /  AST  27  /  ALT  12  /  AlkPhos  77  05-16      PTT - ( 17 May 2020 04:48 )  PTT:90.6 sec                                           CARDIAC MARKERS ( 16 May 2020 05:34 )  x     / 0.10 ng/mL / x     / x     / x                                                LIVER FUNCTIONS - ( 16 May 2020 05:34 )  Alb: 3.7 g/dL / Pro: 6.5 g/dL / ALK PHOS: 77 U/L / ALT: 12 U/L / AST: 27 U/L / GGT: x                                                                                                                                       X-Rays reviewed:                                                                                    ECHO    CXR interpreted by me:  mediastinal LN     MEDICATIONS  (STANDING):  chlorhexidine 4% Liquid 1 Application(s) Topical every 12 hours  heparin  Infusion 1000 Unit(s)/Hr (9 mL/Hr) IV Continuous <Continuous>  hydrocortisone sodium succinate Injectable 100 milliGRAM(s) IV Push every 8 hours  pantoprazole    Tablet 40 milliGRAM(s) Oral before breakfast    MEDICATIONS  (PRN):

## 2020-05-17 NOTE — CONSULT NOTE ADULT - ASSESSMENT
IMPRESSION:    VTE   HO Lymphoma on immunotherapy at [facility]  Probable PHTN     PLAN:    CNS: no depressants     HEENT: Oral care    PULMONARY:  HOB @ 45 degrees.  Aspiration precautions     CARDIOVASCULAR:  I= O  Avoid volume overload.      GI: GI prophylaxis.  Feeding.  Bowel regimen     RENAL:  Follow up lytes.  Correct as needed    INFECTIOUS DISEASE: Follow up cultures    HEMATOLOGICAL:  DVT therapy.  LE Duplex.      ENDOCRINE:  Follow up FS.  Insulin protocol if needed.    MUSCULOSKELETAL:  Bed rest    MICU for now

## 2020-05-18 LAB
ALBUMIN SERPL ELPH-MCNC: 3.4 G/DL — LOW (ref 3.5–5.2)
ALP SERPL-CCNC: 74 U/L — SIGNIFICANT CHANGE UP (ref 30–115)
ALT FLD-CCNC: 10 U/L — SIGNIFICANT CHANGE UP (ref 0–41)
ANION GAP SERPL CALC-SCNC: 18 MMOL/L — HIGH (ref 7–14)
APTT BLD: 24.2 SEC — LOW (ref 27–39.2)
APTT BLD: 60.1 SEC — HIGH (ref 27–39.2)
APTT BLD: 84.9 SEC — CRITICAL HIGH (ref 27–39.2)
AST SERPL-CCNC: 24 U/L — SIGNIFICANT CHANGE UP (ref 0–41)
BILIRUB SERPL-MCNC: 0.5 MG/DL — SIGNIFICANT CHANGE UP (ref 0.2–1.2)
BUN SERPL-MCNC: 36 MG/DL — HIGH (ref 10–20)
CALCIUM SERPL-MCNC: 9.7 MG/DL — SIGNIFICANT CHANGE UP (ref 8.5–10.1)
CHLORIDE SERPL-SCNC: 100 MMOL/L — SIGNIFICANT CHANGE UP (ref 98–110)
CO2 SERPL-SCNC: 17 MMOL/L — SIGNIFICANT CHANGE UP (ref 17–32)
CREAT SERPL-MCNC: 1.2 MG/DL — SIGNIFICANT CHANGE UP (ref 0.7–1.5)
GLUCOSE SERPL-MCNC: 110 MG/DL — HIGH (ref 70–99)
INR BLD: 1.23 RATIO — SIGNIFICANT CHANGE UP (ref 0.65–1.3)
MAGNESIUM SERPL-MCNC: 1.9 MG/DL — SIGNIFICANT CHANGE UP (ref 1.8–2.4)
POTASSIUM SERPL-MCNC: 4.5 MMOL/L — SIGNIFICANT CHANGE UP (ref 3.5–5)
POTASSIUM SERPL-SCNC: 4.5 MMOL/L — SIGNIFICANT CHANGE UP (ref 3.5–5)
PROT SERPL-MCNC: 6.1 G/DL — SIGNIFICANT CHANGE UP (ref 6–8)
PROTHROM AB SERPL-ACNC: 14.2 SEC — HIGH (ref 9.95–12.87)
SODIUM SERPL-SCNC: 135 MMOL/L — SIGNIFICANT CHANGE UP (ref 135–146)

## 2020-05-18 PROCEDURE — 37191 INS ENDOVAS VENA CAVA FILTR: CPT

## 2020-05-18 PROCEDURE — 99222 1ST HOSP IP/OBS MODERATE 55: CPT

## 2020-05-18 PROCEDURE — 71045 X-RAY EXAM CHEST 1 VIEW: CPT | Mod: 26

## 2020-05-18 RX ORDER — METOPROLOL TARTRATE 50 MG
25 TABLET ORAL EVERY 12 HOURS
Refills: 0 | Status: DISCONTINUED | OUTPATIENT
Start: 2020-05-18 | End: 2020-05-20

## 2020-05-18 RX ORDER — HEPARIN SODIUM 5000 [USP'U]/ML
800 INJECTION INTRAVENOUS; SUBCUTANEOUS
Qty: 25000 | Refills: 0 | Status: DISCONTINUED | OUTPATIENT
Start: 2020-05-18 | End: 2020-05-18

## 2020-05-18 RX ORDER — ENOXAPARIN SODIUM 100 MG/ML
60 INJECTION SUBCUTANEOUS
Refills: 0 | Status: DISCONTINUED | OUTPATIENT
Start: 2020-05-18 | End: 2020-05-19

## 2020-05-18 RX ORDER — METOPROLOL TARTRATE 50 MG
12.5 TABLET ORAL
Refills: 0 | Status: DISCONTINUED | OUTPATIENT
Start: 2020-05-18 | End: 2020-05-18

## 2020-05-18 RX ADMIN — ENOXAPARIN SODIUM 60 MILLIGRAM(S): 100 INJECTION SUBCUTANEOUS at 18:21

## 2020-05-18 RX ADMIN — CHLORHEXIDINE GLUCONATE 1 APPLICATION(S): 213 SOLUTION TOPICAL at 05:34

## 2020-05-18 RX ADMIN — Medication 25 MILLIGRAM(S): at 18:10

## 2020-05-18 RX ADMIN — Medication 100 MILLIGRAM(S): at 18:11

## 2020-05-18 RX ADMIN — CHLORHEXIDINE GLUCONATE 1 APPLICATION(S): 213 SOLUTION TOPICAL at 18:22

## 2020-05-18 RX ADMIN — Medication 100 MILLIGRAM(S): at 05:34

## 2020-05-18 RX ADMIN — Medication 100 MILLIGRAM(S): at 21:51

## 2020-05-18 NOTE — PHARMACOTHERAPY INTERVENTION NOTE - COMMENTS
order for enoxaparin 60mg sc q12h & heparin drip  -heparin drip order denotes d/c heparin drip upon enoxaparin administration  -provider to RN order for heparin to be d/c upon enoxaparin administration  -d/c Reyna STEPHEN (RN)-pt being transferred to 3C unit, will reiterate directions when giving report to 3C RN
Prescriber was contacted to correct rate of administration.

## 2020-05-18 NOTE — CHART NOTE - NSCHARTNOTEFT_GEN_A_CORE
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 site for nausea and dyspnea that started 3 days prior to the presentation. Pt endorses she had cancer infusion last Monday at [facility] [place] and the next day she has been having nausea and dyspnea at rest exacerbated by exertion. patient denies fever, chills, chest pain, abdominal pain, cough, or change in bowel or urinary habits.     in the ED vital signs: T(F): Max: 98.5, HR: 100, BP: 123/65, RR: 25, SpO2: 96% on 2 L NC.  patient received 2 L LR, azithro, Rocephin Mg and morphine.   CTA chest with Acute bilateral pulmonary emboli within the distal portion of right main pulmonary artery, proximal left lower lobe pulmonary arterie and with evidence of Severe right heart strain. RV LV ratio = 3.2.  patient was approved for MICU admission.     ICU course:  IR was consulted for embolectomy, determined to not be candidate. She was started on heparin drip. LE u/s found deep venous thrombosis of the right common femoral, superficial femoral and popliteal vein. IR put IVC filter in her on 5/18. She is stable for downgrade.          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    # Acute hypoxemic respiratory failure 2/2 PE  - Currently 98% on 2L  - CTA chest with Acute bilateral pulmonary emboli within the distal portion of right main pulmonary artery, proximal left lower lobe pulmonary arteries and with evidence of Severe right heart strain. RV LV ratio = 3.2. Echo shows EF of 60% with severely reduced RV dysfunction and mild pulmonary HTN  - Continue heparin drip, will likely bridge to eliquis upon discharge. Will likely need to be on AC for life  - Per IR no embolectomy  - On heparin drip    # Right LE DVT  - U/s shows deep venous thrombosis of the right common femoral, superficial femoral and popliteal vein  - IR will place IVC filter on 5/18. ct a/p obtained to r/o mechanical venous compression/obstruction, read pending    # AT with aberrant conduction via the RBBB  - Seen on tele this morning; patient remained asymptomatic  - EP recs: start metoprolol 25 BID    # Stage 4 lymphoma with mets to lung  - Continue home meds    # RA  - Continue methotrxate. 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 Pt endorses she had cancer infusion last Monday at [facility] [place] and the next day she has been having nausea and dyspnea at rest exacerbated by exertion. patient denies fever, chills, chest pain, abdominal pain, cough, or change in bowel or urinary habits.     in the ED vital signs: T(F): Max: 98.5, HR: 100, BP: 123/65, RR: 25, SpO2: 96% on 2 L NC.  patient received 2 L LR, azithro, Rocephin Mg and morphine.   CTA chest with Acute bilateral pulmonary emboli within the distal portion of right main pulmonary artery, proximal left lower lobe pulmonary arterie and with evidence of Severe right heart strain. RV LV ratio = 3.2.  patient was approved for MICU admission.     ICU course:  IR was consulted for embolectomy, determined to not be candidate. She was started on heparin drip. LE u/s found deep venous thrombosis of the right common femoral, superficial femoral and popliteal vein. IR put IVC filter in her on 5/18. She is stable for downgrade.          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    # Acute hypoxemic respiratory failure 2/2 PE  - Currently 98% on 2L  - CTA chest with Acute bilateral pulmonary emboli within the distal portion of right main pulmonary artery, proximal left lower lobe pulmonary arteries and with evidence of Severe right heart strain. RV LV ratio = 3.2. Echo shows EF of 60% with severely reduced RV dysfunction and mild pulmonary HTN  - Continue heparin drip, will likely bridge to eliquis upon discharge. Will likely need to be on AC for life  - Per IR no embolectomy  - On heparin drip, will stop heparin drip at 8PM and start therapeutic lovenox    # Right LE DVT  - U/s shows deep venous thrombosis of the right common femoral, superficial femoral and popliteal vein  - IR will place IVC filter on 5/18. ct a/p obtained to r/o mechanical venous compression/obstruction, read pending    # AT with aberrant conduction via the RBBB  - Seen on tele this morning; patient remained asymptomatic  - EP recs: start metoprolol 25 BID    # Stage 4 lymphoma with mets to lung  - Continue home meds    # RA  - Continue methotrxate. Prednisone changed to hydrocortisone to give stress dose    # HTN  - Continue home meds      DVT ppx: heparin drip  DIet: DASH  Dispo: from home, acute  FULL CODE

## 2020-05-18 NOTE — CONSULT NOTE ADULT - SUBJECTIVE AND OBJECTIVE BOX
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 Pt endorses she had [place] [facility] [place] and the next day she has been having nausea and dyspnea at rest exacerbated by exertion. patient denies fever, chills, chest pain, abdominal pain, cough, or change in bowel or urinary habits.     in the ED vital signs: T(F): Max: 98.5, HR: 100, BP: 123/65, RR: 25, SpO2: 96% on 2 L NC.  patient received 2 L LR, azithro, Rocephin Mg and morphine.   CTA chest with Acute bilateral pulmonary emboli within the distal portion of right main pulmonary artery, proximal left lower lobe pulmonary arterie and with evidence of Severe right heart strain. RV LV ratio = 3.2.  patient was approved for MICU admission.     EP called for evaluation of SVT that occurred this morning, asymptomatic and self-resolving.        PAST MEDICAL & SURGICAL HISTORY:  Rheumatoid arthritis  Lymphoma: mets to lungs, liver and bone  H/O total knee replacement  History of hip replacement          PREVIOUS DIAGNOSTIC TESTING:      ECHO  FINDINGS:  < from: Transthoracic Echocardiogram (05.16.20 @ 15:03) >  Summary:   1. Left ventricular ejection fraction, by visual estimation, is 55-65%.   2. Normal global left ventricular systolic function.   3. Spectral Doppler shows impaired relaxation pattern of left ventricular myocardial filling (Grade I diastolic dysfunction).   4. Right ventricular volume overload.   5. Severely enlarged right ventricle.   6. Severely reduced RV systolic function.   7. Moderate mitral annular calcification.   8. Mild mitral regurgitation.   9. Mild aortic stenosis by planimetry.  10. Trivial aortic regurgitation.  11. Mild-moderate tricuspid regurgitation.  12. Estimated pulmonary artery systolic pressure is 44.7 mmHg assuming a right atrial pressure of 8 mmHg, which is consistent with mild pulmonary hypertension.    < end of copied text >      VENOUS DUPLEX SCAN:  FINDINGS:  < from: VA Duplex Lower Ext Vein Scan, Bilat (05.17.20 @ 10:49) >  Impression:    Deep venous thrombosis of the right common femoral, superficial femoral and popliteal veins. Superficial thrombophlebitis of the right gastrocnemius veins.    No evidence of DVT or superficial thrombus phlebitis left lower extremity    < end of copied text >    CHEST CT PULMONARY ANGIO with IV Contrast:  FINDINGS:  < from: CT Angio Chest w/ IV Cont (05.16.20 @ 09:56) >  Impression:    Acute bilateral pulmonary emboli most notable within the distal portion of right main pulmonary artery, proximal left lower lobe pulmonary arteries    Severe right heart strain. RV LV ratio = 3.2.    Extensive mediastinal, bilateral hilar and right axillary adenopathy.    Small bilateral pleural effusions with interlobular septal thickening. 11 mm right upper lobe pulmonary nodule (3/167)    < end of copied text >      MEDICATIONS  (STANDING):  chlorhexidine 4% Liquid 1 Application(s) Topical every 12 hours  heparin  Infusion 800 Unit(s)/Hr (8 mL/Hr) IV Continuous <Continuous>  hydrocortisone sodium succinate Injectable 100 milliGRAM(s) IV Push every 8 hours  metoprolol tartrate 12.5 milliGRAM(s) Oral two times a day  pantoprazole    Tablet 40 milliGRAM(s) Oral before breakfast  senna 2 Tablet(s) Oral at bedtime  sodium chloride 0.9%. 1000 milliLiter(s) (50 mL/Hr) IV Continuous <Continuous>    MEDICATIONS  (PRN):  ondansetron    Tablet 4 milliGRAM(s) Oral every 6 hours PRN Nausea and/or Vomiting      FAMILY HISTORY:  No pertinent FHx    SOCIAL HISTORY:    CIGARETTES:  denies  ALCOHOL:  denies    Allergies:    No Known Allergies    PSHx  H/O total knee replacement  History of hip replacement    REVIEW OF SYSTEMS:    CONSTITUTIONAL: No fever, weight loss, chills, shakes, or fatigue  EYES: No eye pain, visual disturbances, or discharge  ENMT:  No difficulty hearing, tinnitus, vertigo; No sinus or throat pain  NECK: No pain or stiffness  BREASTS: No pain, masses, or nipple discharge  RESPIRATORY: see HPI  CARDIOVASCULAR: see HPI  GASTROINTESTINAL: No abdominal  or epigastric pain, nausea, vomiting, hematemesis, diarrhea, constipation, melena or bright red blood.  GENITOURINARY: No dysuria, nocturia, hematuria, or urinary incontinence  NEUROLOGICAL: No headaches, memory loss, slurred speech, limb weakness, loss of strength, numbness, or tremors  SKIN: No itching, burning, rashes, or lesions   LYMPH NODES: No enlarged glands  ENDOCRINE: No heat or cold intolerance, or hair loss  MUSCULOSKELETAL: No joint pain or swelling, muscle, back, or extremity pain  PSYCHIATRIC: No depression, anxiety, or difficulty sleeping  HEME/LYMPH: No easy bruising or bleeding gums  ALLERY AND IMMUNOLOGIC: No hives or rash.      Vital Signs Last 24 Hrs  T(C): 35.7 (18 May 2020 08:00), Max: 36.6 (17 May 2020 16:00)  T(F): 96.2 (18 May 2020 08:00), Max: 97.9 (17 May 2020 16:00)  HR: 84 (18 May 2020 09:00) (70 - 88)  BP: 134/88 (18 May 2020 09:00) (102/66 - 158/72)  BP(mean): 107 (18 May 2020 09:00) (76 - 112)  RR: 26 (18 May 2020 09:00) (16 - 35)  SpO2: 98% (18 May 2020 09:00) (87% - 99%)    PHYSICAL EXAM:    GENERAL: Cachectic, anxious  HEAD:  Atraumatic, Normocephalic  EYES: EOMI, PERRLA, conjunctiva and sclera clear  ENMT: No tonsillar erythema, exudates, or enlargements; ist mucous membranes, Good dentition, No lesions  NECK: Supple and normal thyroid.  No JVD or carotid bruit.  Carotid pulse is 2+ bilaterally.  HEART: Regular rate and rhythm; No murmurs, rubs, or gallops.  PULMONARY: Clear to auscultation and perfusion.  No rales, wheezing, or rhonchi bilaterally.  ABDOMEN: Soft, Nontender, Nondistended; Bowel sounds present  EXTREMITIES:  2+ Peripheral Pulses, No clubbing, cyanosis, or edema  LYMPH: No lymphadenopathy noted  NEUROLOGICAL: Grossly nonfocal      INTERPRETATION OF TELEMETRY: 2 episodes of AT occurring at ~1 am and 8 am, longest lasting 1 min    ECG: Sinus with RBBB, RV strain pattern, LAFB    I&O's Detail    17 May 2020 07:01  -  18 May 2020 07:00  --------------------------------------------------------  IN:    heparin Infusion: 117 mL    heparin Infusion: 54 mL    Oral Fluid: 150 mL    sodium chloride 0.9%.: 150 mL  Total IN: 471 mL    OUT:    Voided: 400 mL  Total OUT: 400 mL    Total NET: 71 mL      18 May 2020 07:01  -  18 May 2020 10:22  --------------------------------------------------------  IN:    heparin Infusion: 27 mL    sodium chloride 0.9%.: 150 mL  Total IN: 177 mL    OUT:  Total OUT: 0 mL    Total NET: 177 mL          LABS:                        13.5   15.65 )-----------( 189      ( 17 May 2020 04:48 )             40.5     05-18    135  |  100  |  36<H>  ----------------------------<  110<H>  4.5   |  17  |  1.2    Ca    9.7      18 May 2020 05:50  Mg     1.9     05-18    TPro  6.1  /  Alb  3.4<L>  /  TBili  0.5  /  DBili  x   /  AST  24  /  ALT  10  /  AlkPhos  74  05-18        PT/INR - ( 18 May 2020 05:50 )   PT: 14.20 sec;   INR: 1.23 ratio         PTT - ( 18 May 2020 05:50 )  PTT:84.9 sec    BNP  I&O's Detail    17 May 2020 07:01  -  18 May 2020 07:00  --------------------------------------------------------  IN:    heparin Infusion: 117 mL    heparin Infusion: 54 mL    Oral Fluid: 150 mL    sodium chloride 0.9%.: 150 mL  Total IN: 471 mL    OUT:    Voided: 400 mL  Total OUT: 400 mL    Total NET: 71 mL      18 May 2020 07:01  -  18 May 2020 10:22  --------------------------------------------------------  IN:    heparin Infusion: 27 mL    sodium chloride 0.9%.: 150 mL  Total IN: 177 mL    OUT:  Total OUT: 0 mL    Total NET: 177 mL        Daily     Daily     RADIOLOGY & ADDITIONAL STUDIES:

## 2020-05-18 NOTE — PROGRESS NOTE ADULT - SUBJECTIVE AND OBJECTIVE BOX
Interventional Radiology Follow- Up Note    No complaints offered.    Vitals: T(F): 96.2 (05-18-20 @ 08:00), Max: 97.9 (05-17-20 @ 16:00)  HR: 82 (05-18-20 @ 08:00) (70 - 88)  BP: 136/88 (05-18-20 @ 08:00) (102/66 - 158/72)  RR: 24 (05-18-20 @ 08:00) (16 - 35)  SpO2: 99% (05-18-20 @ 08:00) (87% - 99%)  Wt(kg): --    LABS:                        13.5   15.65 )-----------( 189      ( 17 May 2020 04:48 )             40.5     05-18    135  |  100  |  36<H>  ----------------------------<  110<H>  4.5   |  17  |  1.2    Ca    9.7      18 May 2020 05:50  Mg     1.9     05-18    TPro  6.1  /  Alb  3.4<L>  /  TBili  0.5  /  DBili  x   /  AST  24  /  ALT  10  /  AlkPhos  74  05-18    PT/INR - ( 18 May 2020 05:50 )   PT: 14.20 sec;   INR: 1.23 ratio         PTT - ( 18 May 2020 05:50 )  PTT:84.9 sec      PHYSICAL EXAM:  General: Nontoxic, in NAD  Neuro:  Alert & oriented x 3  CV: +S1+S2 regular rate and rhythm  Lung: clear to ausculation bilaterally, respirations nonlabored, good inspiratory effort  Abdomen: soft, NTND. Normactive BS  Extremities: no pedal edema or calf tenderness noted         Impression: 80y Female admitted with ACUTE PULMONARY EMBOLISM WITH ACUTE COR PULMONALE PULMONARY EMBOLISM      Plan:  Plan for placement of IVCF in IR today,   Keep NPO  D/w patient.   Hold Hepgtt on call to IR       Please call Interventional Radiology [phone] with any questions, concerns, or issues regarding above.

## 2020-05-18 NOTE — PROGRESS NOTE ADULT - ASSESSMENT
IMPRESSION:    VTE   HO Lymphoma on immunotherapy at [facility]  Probable PHTN   Extensive DVT for GFF today     PLAN:    CNS: no depressants     HEENT: Oral care    PULMONARY:  HOB @ 45 degrees.  Aspiration precautions     CARDIOVASCULAR:  I= O  Avoid volume overload. EP eval.  Beta blocker for now     GI: GI prophylaxis.  Feeding post IR .  Bowel regimen     RENAL:  Follow up lytes.  Correct as needed    INFECTIOUS DISEASE: Follow up cultures    HEMATOLOGICAL:  DVT therapy. LMWH Post IR     ENDOCRINE:  Follow up FS.  Insulin protocol if needed.    MUSCULOSKELETAL:  Bed rest    Tele PM if remains stable

## 2020-05-18 NOTE — CONSULT NOTE ADULT - ASSESSMENT
Patient is a 80y old  Female who presents with a chief complaint of dyspnea. She was found to have bilateral sub-massive PE, started on heparin gtt and admitted to ICU.   This morning telemonitor revealed an SVT, most likely AT with aberrant conduction via the RBBB. Arrhythmia was asymptomatic and self-resolved.     SVT with abberancy  Provoked submassive PE   Rheumatoid arthritis  Lymphoma: mets to lungs, liver and bone    Recommendations:   Increase metoprolol to 25 mg q12h   Monitor electrolytes   ICU monitoring

## 2020-05-18 NOTE — PROGRESS NOTE ADULT - SUBJECTIVE AND OBJECTIVE BOX
Patient is a 80y old  Female who presents with a chief complaint of dyspnea (17 May 2020 14:52)        Over Night Events:  Off pressors.  On 2 liters O2.          ROS:     All ROS are negative except HPI         PHYSICAL EXAM    ICU Vital Signs Last 24 Hrs  T(C): 36.3 (18 May 2020 04:00), Max: 36.6 (17 May 2020 16:00)  T(F): 97.3 (18 May 2020 04:00), Max: 97.9 (17 May 2020 16:00)  HR: 80 (18 May 2020 07:00) (70 - 88)  BP: 127/75 (18 May 2020 07:00) (102/66 - 158/72)  BP(mean): 95 (18 May 2020 07:00) (76 - 112)  ABP: --  ABP(mean): --  RR: 20 (18 May 2020 07:00) (16 - 35)  SpO2: 98% (18 May 2020 07:00) (87% - 99%)      CONSTITUTIONAL:  Well nourished.  NAD    ENT:   Airway patent,   Mouth with normal mucosa.   No thrush    EYES:   Pupils equal,   Round and reactive to light.    CARDIAC:   Normal rate,   Regular rhythm.    No edema      Vascular:  Normal systolic impulse  No Carotid bruits    RESPIRATORY:   No wheezing  Bilateral BS  Normal chest expansion  Not tachypneic,  No use of accessory muscles    GASTROINTESTINAL:  Abdomen soft,   Non-tender,   No guarding,   + BS    GENITOURINARY  normal genitalia for sex  no edema    MUSCULOSKELETAL:   Range of motion is not limited,  No clubbing, cyanosis    NEUROLOGICAL:   Alert and oriented   No motor  deficits.    SKIN:   Skin normal color for race,   Warm and dry   No evidence of rash.    PSYCHIATRIC:   No apparent risk to self or others.    HEMATOLOGICAL:  No cervical  lymphadenopathy.  no inguinal lymphadenopathy      05-17-20 @ 07:01  -  05-18-20 @ 07:00  --------------------------------------------------------  IN:    heparin Infusion: 54 mL    heparin Infusion: 117 mL    Oral Fluid: 150 mL    sodium chloride 0.9%.: 150 mL  Total IN: 471 mL    OUT:    Voided: 400 mL  Total OUT: 400 mL    Total NET: 71 mL          LABS:                            13.5   15.65 )-----------( 189      ( 17 May 2020 04:48 )             40.5                                               05-18    135  |  100  |  36<H>  ----------------------------<  110<H>  4.5   |  17  |  1.2    18 May 2020 05:50    135    |  100    |  36<H>  ----------------------------<  110<H>  4.5     |  17     |  1.2    17 May 2020 04:48    136    |  100    |  36<H>  ----------------------------<  111<H>  5.0     |  18     |  1.5      Ca    9.7        18 May 2020 05:50  Ca    9.8        17 May 2020 04:48  Mg     1.9       18 May 2020 05:50  Mg     2.0       17 May 2020 04:48    TPro  6.1    /  Alb  3.4<L>  /  TBili  0.5    /  DBili  x      /  AST  24     /  ALT  10     /  AlkPhos  74     18 May 2020 05:50      Ca    9.7      18 May 2020 05:50  Mg     1.9     05-18    TPro  6.1  /  Alb  3.4<L>  /  TBili  0.5  /  DBili  x   /  AST  24  /  ALT  10  /  AlkPhos  74  05-18      PT/INR - ( 18 May 2020 05:50 )   PT: 14.20 sec;   INR: 1.23 ratio         PTT - ( 18 May 2020 05:50 )  PTT:84.9 sec                                                                                     LIVER FUNCTIONS - ( 18 May 2020 05:50 )  Alb: 3.4 g/dL / Pro: 6.1 g/dL / ALK PHOS: 74 U/L / ALT: 10 U/L / AST: 24 U/L / GGT: x                                                  Culture - Blood (collected 16 May 2020 06:17)  Source: .Blood Blood  Preliminary Report (17 May 2020 18:01):    No growth to date.    Culture - Blood (collected 16 May 2020 06:17)  Source: .Blood Blood  Preliminary Report (17 May 2020 18:01):    No growth to date.                                                                                           MEDICATIONS  (STANDING):  chlorhexidine 4% Liquid 1 Application(s) Topical every 12 hours  heparin  Infusion 900 Unit(s)/Hr (9 mL/Hr) IV Continuous <Continuous>  hydrocortisone sodium succinate Injectable 100 milliGRAM(s) IV Push every 8 hours  pantoprazole    Tablet 40 milliGRAM(s) Oral before breakfast  senna 2 Tablet(s) Oral at bedtime  sodium chloride 0.9%. 1000 milliLiter(s) (50 mL/Hr) IV Continuous <Continuous>    MEDICATIONS  (PRN):  ondansetron    Tablet 4 milliGRAM(s) Oral every 6 hours PRN Nausea and/or Vomiting      New X-rays reviewed:                                                                                  ECHO    CXR interpreted by me:  Increased vascular congestion

## 2020-05-18 NOTE — CONSULT NOTE ADULT - ATTENDING COMMENTS
SVT - long RP tachycardia  Dx includes AT or AVNRT can not ro atypical AVNRT  - recommend BB at this time  - if arrythmia reoccurs will revisit possibility more invasive treatement

## 2020-05-18 NOTE — PROGRESS NOTE ADULT - SUBJECTIVE AND OBJECTIVE BOX
Hospital Day:  2d    Subjective:    Patient is a 80y old Female who presents with a chief complaint of dyspnea (18 May 2020 10:21)  This morning patient is lying in bed comfortably. She reports no new complaints, including SOB, chest pain, abdominal pain, nausea, vomiting, dysuria, constipation, or diarrhea.    Past Medical Hx:   Rheumatoid arthritis  Lymphoma    Past Sx:  H/O total knee replacement  History of hip replacement    Allergies:  No Known Allergies    Current Meds:   Standng Meds:  chlorhexidine 4% Liquid 1 Application(s) Topical every 12 hours  heparin  Infusion 800 Unit(s)/Hr (8 mL/Hr) IV Continuous <Continuous>  hydrocortisone sodium succinate Injectable 100 milliGRAM(s) IV Push every 8 hours  pantoprazole    Tablet 40 milliGRAM(s) Oral before breakfast  senna 2 Tablet(s) Oral at bedtime  sodium chloride 0.9%. 1000 milliLiter(s) (50 mL/Hr) IV Continuous <Continuous>    PRN Meds:  ondansetron    Tablet 4 milliGRAM(s) Oral every 6 hours PRN Nausea and/or Vomiting    HOME MEDICATIONS:  lisinopril 20 mg oral tablet: 1 tab(s) orally once a day  methotrexate: 8 pills once a week  metoprolol succinate 50 mg oral tablet, extended release: 1 tab(s) orally once a day  oxyCODONE 5 mg oral tablet:   predniSONE 10 mg oral tablet: 1 tab(s) orally once a day  Zofran:       Vital Signs:   T(F): 96.2 (05-18-20 @ 08:00), Max: 97.9 (05-17-20 @ 16:00)  HR: 84 (05-18-20 @ 09:00) (70 - 88)  BP: 134/88 (05-18-20 @ 09:00) (102/66 - 158/72)  RR: 26 (05-18-20 @ 09:00) (16 - 35)  SpO2: 98% (05-18-20 @ 09:00) (87% - 99%)      05-17-20 @ 07:01  -  05-18-20 @ 07:00  --------------------------------------------------------  IN: 471 mL / OUT: 400 mL / NET: 71 mL    05-18-20 @ 07:01  -  05-18-20 @ 10:50  --------------------------------------------------------  IN: 177 mL / OUT: 0 mL / NET: 177 mL        Physical Exam:   GENERAL: NAD  HEENT: NCAT  CHEST/LUNG: CTAB  HEART: Regular rate and rhythm; s1 s2 appreciated, No murmurs, rubs, or gallops  ABDOMEN: Soft, Nontender, Nondistended; Bowel sounds present  EXTREMITIES: No LE edema b/l  SKIN: no rashes, no new lesions  NERVOUS SYSTEM:  Alert & Oriented X3        Labs:                         13.5   15.65 )-----------( 189      ( 17 May 2020 04:48 )             40.5       18 May 2020 05:50    135    |  100    |  36     ----------------------------<  110    4.5     |  17     |  1.2      Ca    9.7        18 May 2020 05:50  Mg     1.9       18 May 2020 05:50    TPro  6.1    /  Alb  3.4    /  TBili  0.5    /  DBili  x      /  AST  24     /  ALT  10     /  AlkPhos  74     18 May 2020 05:50       pTT    84.9             ----< 1.23 INR  (05-18-20 @ 05:50)    14.20        PT,    pTT    60.1             ----< -- INR  (05-17-20 @ 23:33)    --        PT        Serum Pro-Brain Natriuretic Peptide: 3162 pg/mL (05-16-20 @ 05:34)    Troponin 0.10, CKMB --, CK -- 05-16-20 @ 05:34              Culture - Blood (collected 05-16-20 @ 06:17)  Source: .Blood Blood  Preliminary Report (05-17-20 @ 18:01):    No growth to date.    Culture - Blood (collected 05-16-20 @ 06:17)  Source: .Blood Blood  Preliminary Report (05-17-20 @ 18:01):    No growth to date.            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    # Acute hypoxemic respiratory failure 2/2 PE  - Currently 98% on 2L  - CTA chest with Acute bilateral pulmonary emboli within the distal portion of right main pulmonary artery, proximal left lower lobe pulmonary arteries and with evidence of Severe right heart strain. RV LV ratio = 3.2. Echo shows EF of 60% with severely reduced RV dysfunction and mild pulmonary HTN  - Continue heparin drip, will likely bridge to eliquis upon discharge. Will likely need to be on AC for life  - Per IR no embolectomy  - On heparin drip    # Right LE DVT  - U/s shows deep venous thrombosis of the right common femoral, superficial femoral and popliteal vein  - IR will place IVC filter on 5/18. Please obtain ct a/p as well to r/o mechanical venous compression/obstruction    # AT with aberrant conduction via the RBBB  - Seen on tele this morning; patient remained asymptomatic  - EP recs: start metoprolol 25 BID    # Stage 4 lymphoma with mets to lung  - Continue home meds    # RA  - Continue methotrxate. Prednisone changed to hydrocortisone to give stress dose    # HTN  - Continue home meds      DVT ppx: heparin drip  DIet: DASH  Dispo: from home, acute  FULL CODE Hospital Day:  2d    Subjective:    Patient is a 80y old Female who presents with a chief complaint of dyspnea (18 May 2020 10:21)  This morning patient is lying in bed comfortably. She reports no new complaints, including SOB, chest pain, abdominal pain, nausea, vomiting, dysuria, constipation, or diarrhea.    Past Medical Hx:   Rheumatoid arthritis  Lymphoma    Past Sx:  H/O total knee replacement  History of hip replacement    Allergies:  No Known Allergies    Current Meds:   Standng Meds:  chlorhexidine 4% Liquid 1 Application(s) Topical every 12 hours  heparin  Infusion 800 Unit(s)/Hr (8 mL/Hr) IV Continuous <Continuous>  hydrocortisone sodium succinate Injectable 100 milliGRAM(s) IV Push every 8 hours  pantoprazole    Tablet 40 milliGRAM(s) Oral before breakfast  senna 2 Tablet(s) Oral at bedtime  sodium chloride 0.9%. 1000 milliLiter(s) (50 mL/Hr) IV Continuous <Continuous>    PRN Meds:  ondansetron    Tablet 4 milliGRAM(s) Oral every 6 hours PRN Nausea and/or Vomiting    HOME MEDICATIONS:  lisinopril 20 mg oral tablet: 1 tab(s) orally once a day  methotrexate: 8 pills once a week  metoprolol succinate 50 mg oral tablet, extended release: 1 tab(s) orally once a day  oxyCODONE 5 mg oral tablet:   predniSONE 10 mg oral tablet: 1 tab(s) orally once a day  Zofran:       Vital Signs:   T(F): 96.2 (05-18-20 @ 08:00), Max: 97.9 (05-17-20 @ 16:00)  HR: 84 (05-18-20 @ 09:00) (70 - 88)  BP: 134/88 (05-18-20 @ 09:00) (102/66 - 158/72)  RR: 26 (05-18-20 @ 09:00) (16 - 35)  SpO2: 98% (05-18-20 @ 09:00) (87% - 99%)      05-17-20 @ 07:01  -  05-18-20 @ 07:00  --------------------------------------------------------  IN: 471 mL / OUT: 400 mL / NET: 71 mL    05-18-20 @ 07:01  -  05-18-20 @ 10:50  --------------------------------------------------------  IN: 177 mL / OUT: 0 mL / NET: 177 mL        Physical Exam:   GENERAL: NAD  HEENT: NCAT  CHEST/LUNG: CTAB  HEART: Regular rate and rhythm; s1 s2 appreciated, No murmurs, rubs, or gallops  ABDOMEN: Soft, Nontender, Nondistended; Bowel sounds present  EXTREMITIES: No LE edema b/l  SKIN: no rashes, no new lesions  NERVOUS SYSTEM:  Alert & Oriented X3        Labs:                         13.5   15.65 )-----------( 189      ( 17 May 2020 04:48 )             40.5       18 May 2020 05:50    135    |  100    |  36     ----------------------------<  110    4.5     |  17     |  1.2      Ca    9.7        18 May 2020 05:50  Mg     1.9       18 May 2020 05:50    TPro  6.1    /  Alb  3.4    /  TBili  0.5    /  DBili  x      /  AST  24     /  ALT  10     /  AlkPhos  74     18 May 2020 05:50       pTT    84.9             ----< 1.23 INR  (05-18-20 @ 05:50)    14.20        PT,    pTT    60.1             ----< -- INR  (05-17-20 @ 23:33)    --        PT        Serum Pro-Brain Natriuretic Peptide: 3162 pg/mL (05-16-20 @ 05:34)    Troponin 0.10, CKMB --, CK -- 05-16-20 @ 05:34              Culture - Blood (collected 05-16-20 @ 06:17)  Source: .Blood Blood  Preliminary Report (05-17-20 @ 18:01):    No growth to date.    Culture - Blood (collected 05-16-20 @ 06:17)  Source: .Blood Blood  Preliminary Report (05-17-20 @ 18:01):    No growth to date.            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    # Acute hypoxemic respiratory failure 2/2 PE  - Currently 98% on 2L  - CTA chest with Acute bilateral pulmonary emboli within the distal portion of right main pulmonary artery, proximal left lower lobe pulmonary arteries and with evidence of Severe right heart strain. RV LV ratio = 3.2. Echo shows EF of 60% with severely reduced RV dysfunction and mild pulmonary HTN  - Continue heparin drip, will likely bridge to eliquis upon discharge. Will likely need to be on AC for life  - Per IR no embolectomy  - On heparin drip    # Right LE DVT  - U/s shows deep venous thrombosis of the right common femoral, superficial femoral and popliteal vein  - IR will place IVC filter on 5/18. Please obtain ct a/p as well to r/o mechanical venous compression/obstruction    # AT with aberrant conduction via the RBBB  - Seen on tele this morning; patient remained asymptomatic  - EP recs: start metoprolol 25 BID    # Stage 4 lymphoma with mets to lung  - Continue home meds    # RA  - Continue methotrxate. Prednisone changed to hydrocortisone to give stress dose    # HTN  - Continue home meds      DVT ppx: heparin drip  DIet: DASH  Dispo: from home, acute  FULL CODE  Family called and updated.

## 2020-05-18 NOTE — PROGRESS NOTE ADULT - SUBJECTIVE AND OBJECTIVE BOX
INTERVENTIONAL RADIOLOGY BRIEF-OPERATIVE NOTE    Procedure: IVCF insertion	    Pre-Op Diagnosis: DVT    Post-Op Diagnosis: same    Attending: Jennifer  Resident: Josselin    Anesthesia (type):  [ ] General Anesthesia  [x] Sedation  [ ] Spinal Anesthesia  [x] Local/Regional    Contrast: 20cc    Estimated Blood Loss: minimal (<5cc)    Condition:   [ ] Critical  [ ] Serious  [ ] Fair   [x] Good    Findings/Follow up Plan of Care: uncomplicated, successful infrarenal placement of IVCF    Specimens Removed: none    Implants:   IVCF    Complications:  none    Disposition:  to floor    Please call Interventional Radiology [phone] with any questions, concerns, or issues.

## 2020-05-19 LAB
ALBUMIN SERPL ELPH-MCNC: 3.3 G/DL — LOW (ref 3.5–5.2)
ALP SERPL-CCNC: 74 U/L — SIGNIFICANT CHANGE UP (ref 30–115)
ALT FLD-CCNC: 10 U/L — SIGNIFICANT CHANGE UP (ref 0–41)
ANION GAP SERPL CALC-SCNC: 20 MMOL/L — HIGH (ref 7–14)
APPEARANCE UR: CLEAR — SIGNIFICANT CHANGE UP
APTT BLD: 27.3 SEC — SIGNIFICANT CHANGE UP (ref 27–39.2)
AST SERPL-CCNC: 20 U/L — SIGNIFICANT CHANGE UP (ref 0–41)
BACTERIA # UR AUTO: NEGATIVE — SIGNIFICANT CHANGE UP
BASOPHILS # BLD AUTO: 0.03 K/UL — SIGNIFICANT CHANGE UP (ref 0–0.2)
BASOPHILS NFR BLD AUTO: 0.2 % — SIGNIFICANT CHANGE UP (ref 0–1)
BILIRUB SERPL-MCNC: 0.5 MG/DL — SIGNIFICANT CHANGE UP (ref 0.2–1.2)
BILIRUB UR-MCNC: NEGATIVE — SIGNIFICANT CHANGE UP
BUN SERPL-MCNC: 40 MG/DL — HIGH (ref 10–20)
CALCIUM SERPL-MCNC: 9.8 MG/DL — SIGNIFICANT CHANGE UP (ref 8.5–10.1)
CHLORIDE SERPL-SCNC: 102 MMOL/L — SIGNIFICANT CHANGE UP (ref 98–110)
CO2 SERPL-SCNC: 18 MMOL/L — SIGNIFICANT CHANGE UP (ref 17–32)
COLOR SPEC: YELLOW — SIGNIFICANT CHANGE UP
CREAT SERPL-MCNC: 1.2 MG/DL — SIGNIFICANT CHANGE UP (ref 0.7–1.5)
DIFF PNL FLD: ABNORMAL
EOSINOPHIL # BLD AUTO: 0.02 K/UL — SIGNIFICANT CHANGE UP (ref 0–0.7)
EOSINOPHIL NFR BLD AUTO: 0.2 % — SIGNIFICANT CHANGE UP (ref 0–8)
EPI CELLS # UR: 3 /HPF — SIGNIFICANT CHANGE UP (ref 0–5)
GLUCOSE SERPL-MCNC: 112 MG/DL — HIGH (ref 70–99)
GLUCOSE UR QL: NEGATIVE — SIGNIFICANT CHANGE UP
HCT VFR BLD CALC: 45.2 % — SIGNIFICANT CHANGE UP (ref 37–47)
HGB BLD-MCNC: 14.6 G/DL — SIGNIFICANT CHANGE UP (ref 12–16)
HYALINE CASTS # UR AUTO: 7 /LPF — SIGNIFICANT CHANGE UP (ref 0–7)
IMM GRANULOCYTES NFR BLD AUTO: 1.4 % — HIGH (ref 0.1–0.3)
KETONES UR-MCNC: SIGNIFICANT CHANGE UP
LEUKOCYTE ESTERASE UR-ACNC: ABNORMAL
LYMPHOCYTES # BLD AUTO: 0.31 K/UL — LOW (ref 1.2–3.4)
LYMPHOCYTES # BLD AUTO: 2.5 % — LOW (ref 20.5–51.1)
MAGNESIUM SERPL-MCNC: 2 MG/DL — SIGNIFICANT CHANGE UP (ref 1.8–2.4)
MCHC RBC-ENTMCNC: 31.6 PG — HIGH (ref 27–31)
MCHC RBC-ENTMCNC: 32.3 G/DL — SIGNIFICANT CHANGE UP (ref 32–37)
MCV RBC AUTO: 97.8 FL — SIGNIFICANT CHANGE UP (ref 81–99)
MONOCYTES # BLD AUTO: 0.63 K/UL — HIGH (ref 0.1–0.6)
MONOCYTES NFR BLD AUTO: 5 % — SIGNIFICANT CHANGE UP (ref 1.7–9.3)
NEUTROPHILS # BLD AUTO: 11.44 K/UL — HIGH (ref 1.4–6.5)
NEUTROPHILS NFR BLD AUTO: 90.7 % — HIGH (ref 42.2–75.2)
NITRITE UR-MCNC: NEGATIVE — SIGNIFICANT CHANGE UP
NRBC # BLD: 0 /100 WBCS — SIGNIFICANT CHANGE UP (ref 0–0)
PH UR: 6.5 — SIGNIFICANT CHANGE UP (ref 5–8)
PLATELET # BLD AUTO: 223 K/UL — SIGNIFICANT CHANGE UP (ref 130–400)
POTASSIUM SERPL-MCNC: 4.7 MMOL/L — SIGNIFICANT CHANGE UP (ref 3.5–5)
POTASSIUM SERPL-SCNC: 4.7 MMOL/L — SIGNIFICANT CHANGE UP (ref 3.5–5)
PROT SERPL-MCNC: 6.1 G/DL — SIGNIFICANT CHANGE UP (ref 6–8)
PROT UR-MCNC: ABNORMAL
RBC # BLD: 4.62 M/UL — SIGNIFICANT CHANGE UP (ref 4.2–5.4)
RBC # FLD: 12.4 % — SIGNIFICANT CHANGE UP (ref 11.5–14.5)
RBC CASTS # UR COMP ASSIST: 27 /HPF — HIGH (ref 0–4)
SODIUM SERPL-SCNC: 140 MMOL/L — SIGNIFICANT CHANGE UP (ref 135–146)
SP GR SPEC: >1.05 (ref 1.01–1.02)
UROBILINOGEN FLD QL: SIGNIFICANT CHANGE UP
WBC # BLD: 12.61 K/UL — HIGH (ref 4.8–10.8)
WBC # FLD AUTO: 12.61 K/UL — HIGH (ref 4.8–10.8)
WBC UR QL: 38 /HPF — HIGH (ref 0–5)

## 2020-05-19 PROCEDURE — 99233 SBSQ HOSP IP/OBS HIGH 50: CPT

## 2020-05-19 RX ORDER — LISINOPRIL 2.5 MG/1
1 TABLET ORAL
Qty: 0 | Refills: 0 | DISCHARGE

## 2020-05-19 RX ORDER — APIXABAN 2.5 MG/1
10 TABLET, FILM COATED ORAL EVERY 12 HOURS
Refills: 0 | Status: DISCONTINUED | OUTPATIENT
Start: 2020-05-19 | End: 2020-05-20

## 2020-05-19 RX ORDER — APIXABAN 2.5 MG/1
1 TABLET, FILM COATED ORAL
Qty: 74 | Refills: 0
Start: 2020-05-19 | End: 2020-06-17

## 2020-05-19 RX ORDER — METOPROLOL TARTRATE 50 MG
1 TABLET ORAL
Qty: 0 | Refills: 0 | DISCHARGE

## 2020-05-19 RX ADMIN — PANTOPRAZOLE SODIUM 40 MILLIGRAM(S): 20 TABLET, DELAYED RELEASE ORAL at 05:28

## 2020-05-19 RX ADMIN — APIXABAN 10 MILLIGRAM(S): 2.5 TABLET, FILM COATED ORAL at 17:24

## 2020-05-19 RX ADMIN — Medication 25 MILLIGRAM(S): at 17:24

## 2020-05-19 RX ADMIN — ENOXAPARIN SODIUM 60 MILLIGRAM(S): 100 INJECTION SUBCUTANEOUS at 05:28

## 2020-05-19 RX ADMIN — Medication 100 MILLIGRAM(S): at 13:22

## 2020-05-19 RX ADMIN — Medication 100 MILLIGRAM(S): at 21:57

## 2020-05-19 RX ADMIN — CHLORHEXIDINE GLUCONATE 1 APPLICATION(S): 213 SOLUTION TOPICAL at 05:27

## 2020-05-19 RX ADMIN — SENNA PLUS 2 TABLET(S): 8.6 TABLET ORAL at 21:57

## 2020-05-19 RX ADMIN — Medication 100 MILLIGRAM(S): at 05:27

## 2020-05-19 RX ADMIN — Medication 25 MILLIGRAM(S): at 05:28

## 2020-05-19 NOTE — PHYSICAL THERAPY INITIAL EVALUATION ADULT - GENERAL OBSERVATIONS, REHAB EVAL
pt encountered supine in bed in NAD, agreeable to PT IE, pt seen with OT elise, + 2LO2 via NC. pt desat to 86% after bed to chair transfer - resat to 96% - Dr Cash aware

## 2020-05-19 NOTE — DISCHARGE NOTE PROVIDER - NSDCMRMEDTOKEN_GEN_ALL_CORE_FT
Eliquis Starter Pack for Treatment of DVT and PE 5 mg oral tablet: 2 tab(s) orally 2 times a day for 7 days; then 1 tab orally 2 times a day  lisinopril 20 mg oral tablet: 1 tab(s) orally once a day  methotrexate: 8 pills once a week  metoprolol succinate 50 mg oral tablet, extended release: 1 tab(s) orally once a day  oxyCODONE 5 mg oral tablet:   predniSONE 10 mg oral tablet: 1 tab(s) orally once a day  Zofran: Eliquis Starter Pack for Treatment of DVT and PE 5 mg oral tablet: 2 tab(s) orally 2 times a day for 7 days; then 1 tab orally 2 times a day  lisinopril 20 mg oral tablet: 1 tab(s) orally once a day  metoprolol succinate 50 mg oral tablet, extended release: 1 tab(s) orally once a day  oxyCODONE 5 mg oral tablet:   predniSONE 10 mg oral tablet: 1 tab(s) orally once a day  Zofran:

## 2020-05-19 NOTE — OCCUPATIONAL THERAPY INITIAL EVALUATION ADULT - LIVES WITH, PROFILE
DILLAN Probe Removed   children/[place] with 80 y.o. spouce and 45 y.o. son./spouse children/[place] with 80 y.o. spouse and 45 y.o. son./spouse

## 2020-05-19 NOTE — DISCHARGE NOTE PROVIDER - CARE PROVIDER_API CALL
Esteban Hsieh  CRITICAL CARE MEDICINE  [address] [place] [id number]  Phone: [phone]  Fax: [phone]  Follow Up Time: 2 weeks

## 2020-05-19 NOTE — PROGRESS NOTE ADULT - SUBJECTIVE AND OBJECTIVE BOX
Patient is a 80y old  Female who presents with a chief complaint of dyspnea (19 May 2020 15:21)        Over Night Events:        ROS:     All ROS are negative except HPI         PHYSICAL EXAM    ICU Vital Signs Last 24 Hrs  T(C): 35.6 (19 May 2020 12:42), Max: 36.2 (18 May 2020 20:22)  T(F): 96 (19 May 2020 12:42), Max: 97.2 (18 May 2020 20:22)  HR: 87 (19 May 2020 12:42) (74 - 87)  BP: 125/68 (19 May 2020 12:42) (113/79 - 146/77)  BP(mean): 90 (18 May 2020 18:00) (90 - 90)  ABP: --  ABP(mean): --  RR: 18 (19 May 2020 12:42) (16 - 24)  SpO2: 94% (19 May 2020 12:44) (87% - 99%)      CONSTITUTIONAL:   Ill appearing.  Well nourished.  NAD    ENT:   Airway patent,   Mouth with normal mucosa.   No thrush    EYES:   Pupils equal,   Round and reactive to light.    CARDIAC:   Normal rate,   Regular rhythm.    No edema      Vascular:  Normal systolic impulse  No Carotid bruits    RESPIRATORY:   No wheezing  Bilateral BS  Normal chest expansion  Not tachypneic,  No use of accessory muscles    GASTROINTESTINAL:  Abdomen soft,   Non-tender,   No guarding,   + BS    GENITOURINARY  normal genitalia for sex  no edema    MUSCULOSKELETAL:   Range of motion is not limited,  No clubbing, cyanosis    NEUROLOGICAL:   Alert and oriented   No motor  deficits.  pertinent DTRs normal    SKIN:   Skin normal color for race,   Warm and dry and intact.   No evidence of rash.    PSYCHIATRIC:   Normal mood and affect.   No apparent risk to self or others.    HEMATOLOGICAL:  No cervical  lymphadenopathy.  no inguinal lymphadenopathy      05-18-20 @ 07:01  -  05-19-20 @ 07:00  --------------------------------------------------------  IN:    heparin Infusion: 27 mL    Oral Fluid: 120 mL    sodium chloride 0.9%: 150 mL  Total IN: 297 mL    OUT:  Total OUT: 0 mL    Total NET: 297 mL      05-19-20 @ 07:01  -  05-19-20 @ 17:05  --------------------------------------------------------  IN:    Oral Fluid: 570 mL  Total IN: 570 mL    OUT:  Total OUT: 0 mL    Total NET: 570 mL          LABS:                            14.6   12.61 )-----------( 223      ( 19 May 2020 06:11 )             45.2                                               05-19    140  |  102  |  40<H>  ----------------------------<  112<H>  4.7   |  18  |  1.2    Ca    9.8      19 May 2020 06:11  Mg     2.0     05-19    TPro  6.1  /  Alb  3.3<L>  /  TBili  0.5  /  DBili  x   /  AST  20  /  ALT  10  /  AlkPhos  74  05-19      PT/INR - ( 18 May 2020 05:50 )   PT: 14.20 sec;   INR: 1.23 ratio         PTT - ( 19 May 2020 06:11 )  PTT:27.3 sec                                       Urinalysis Basic - ( 19 May 2020 05:45 )    Color: Yellow / Appearance: Clear / SG: >1.050 / pH: x  Gluc: x / Ketone: Trace  / Bili: Negative / Urobili: <2 mg/dL   Blood: x / Protein: 30 mg/dL / Nitrite: Negative   Leuk Esterase: Small / RBC: 27 /HPF / WBC 38 /HPF   Sq Epi: x / Non Sq Epi: 3 /HPF / Bacteria: Negative                                                  LIVER FUNCTIONS - ( 19 May 2020 06:11 )  Alb: 3.3 g/dL / Pro: 6.1 g/dL / ALK PHOS: 74 U/L / ALT: 10 U/L / AST: 20 U/L / GGT: x                                                                                                                                       MEDICATIONS  (STANDING):  apixaban 10 milliGRAM(s) Oral every 12 hours  chlorhexidine 4% Liquid 1 Application(s) Topical every 12 hours  hydrocortisone sodium succinate Injectable 100 milliGRAM(s) IV Push every 8 hours  metoprolol tartrate 25 milliGRAM(s) Oral every 12 hours  pantoprazole    Tablet 40 milliGRAM(s) Oral before breakfast  senna 2 Tablet(s) Oral at bedtime    MEDICATIONS  (PRN):  ondansetron    Tablet 4 milliGRAM(s) Oral every 6 hours PRN Nausea and/or Vomiting      New X-rays reviewed:                                                                                  ECHO    CXR interpreted by me:

## 2020-05-19 NOTE — CHART NOTE - NSCHARTNOTEFT_GEN_A_CORE
Spoke with patient's  and health care proxy, Roman ([phone]) regarding patient's conditions, treatment options, and prognosis.

## 2020-05-19 NOTE — OCCUPATIONAL THERAPY INITIAL EVALUATION ADULT - GENERAL OBSERVATIONS, REHAB EVAL
Pt found semi-reclined in bed +tele +IV lock, +2L O2 NC +call bell within reach in NAD. Pt agreeable to OT evaluation. Left pt sitting OOB in bedside chair +chair alarm +telel +2L O2 NC, +call bell within reach. PCA aware.

## 2020-05-19 NOTE — DISCHARGE NOTE NURSING/CASE MANAGEMENT/SOCIAL WORK - PATIENT PORTAL LINK FT
You can access the FollowMyHealth Patient Portal offered by [facility] by registering at the following website: [email or web address]. By joining [employer]’s FollowMyHealth portal, you will also be able to view your health information using other applications (apps) compatible with our system.

## 2020-05-19 NOTE — PHYSICAL THERAPY INITIAL EVALUATION ADULT - PERTINENT HX OF CURRENT PROBLEM, REHAB EVAL
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

## 2020-05-19 NOTE — PROGRESS NOTE ADULT - ASSESSMENT
IMPRESSION:    VTE   HO Lymphoma on immunotherapy at [facility]  Probable PHTN   Extensive DVT SP GFF     PLAN:    CNS: no depressants. FU MR results     HEENT: Oral care    PULMONARY:  HOB @ 45 degrees.  Aspiration precautions     CARDIOVASCULAR:  I= O  Avoid volume overload.     GI: GI prophylaxis.  feeding  Bowel regimen     RENAL:  Follow up lytes.  Correct as needed    INFECTIOUS DISEASE: Follow up cultures    HEMATOLOGICAL:  DVT therapy for now     ENDOCRINE:  Follow up FS.      MUSCULOSKELETAL:  OOB  to chair     OP Pulm follow up

## 2020-05-19 NOTE — OCCUPATIONAL THERAPY INITIAL EVALUATION ADULT - NS ASR FOLLOW COMMAND OT EVAL
Able to follow 2-step commands.  [place]./100% of the time/able to follow multistep instructions

## 2020-05-19 NOTE — OCCUPATIONAL THERAPY INITIAL EVALUATION ADULT - ADDITIONAL COMMENTS
Pt was independent with bADLS. No HHA. Son does her laundry, cooking, and home management related activities. Has tub at home with grab bars installed and shower door.

## 2020-05-19 NOTE — DISCHARGE NOTE PROVIDER - HOSPITAL COURSE
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 In the ED, CTA chest revealed acute bilateral pulmonary emboli within the distal portion of the right main pulmonary artery and proximal left lower lobe arteries. Echocardiogram confirmed severe right heart strain with RV/LV ration 3.2. Patient was admitted to the ICU at [facility]. Patient was evaluated by IR, who deemed that the patient was not a candidate for embolectomy. An IVC filter was placed on 5/18/2020 and the patient was fully anticoagulated. Patient will be discharged on Eliquis. Patient requires oxygen on discharge as she was desaturating to 86% on room air at rest. 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 In the ED, CTA chest revealed acute bilateral pulmonary emboli within the distal portion of the right main pulmonary artery and proximal left lower lobe arteries. Echocardiogram confirmed severe right heart strain with RV/LV ration 3.2. Patient was admitted to the ICU at [facility]. Patient was evaluated by IR, who deemed that the patient was not a candidate for embolectomy. An IVC filter was placed on 5/18/2020 and the patient was fully anticoagulated. Outpatient MRI head 1 week prior to admission showed no evidence of brain metastases. Patient will be discharged on Eliquis. Patient requires oxygen on discharge as she was desaturating to 87% on room air with ambulation.        Patient is medically stable and ready for discharge. 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 In the ED, CTA chest revealed acute bilateral pulmonary emboli within the distal portion of the right main pulmonary artery and proximal left lower lobe arteries. Echocardiogram confirmed severe right heart strain with RV/LV ration 3.2. Patient was admitted to the ICU at [facility]. Patient was evaluated by IR, who deemed that the patient was not a candidate for embolectomy. An IVC filter was placed on 5/18/2020 and the patient was fully anticoagulated. Outpatient MRI head 1 week prior to admission showed no evidence of brain metastases. Patient will be discharged on Eliquis. Patient now saturating 97% on room air while ambulating.        Patient is medically stable and ready for discharge.

## 2020-05-19 NOTE — PROGRESS NOTE ADULT - SUBJECTIVE AND OBJECTIVE BOX
CORA BRAMBILA    Patient is a 80y old  Female who presents with a chief complaint of dyspnea (19 May 2020 08:47)    INTERVAL HPI/OVERNIGHT EVENTS: pt transferred from ICU. No events on telemetry, pt still requires O2 via NC, 2, desaturates even with minimal exertion, when moved from bed to chair. Denies chest pain, palpitations.     CONSTITUTIONAL: No weakness, fevers or chills  EYES/ENT: No visual changes;  No vertigo or throat pain   NECK: No pain or stiffness  RESPIRATORY: + shortness of breath  CARDIOVASCULAR: No chest pain or palpitations  GASTROINTESTINAL: No abdominal or epigastric pain. No nausea, vomiting, or hematemesis; No diarrhea or constipation. No melena or hematochezia.  GENITOURINARY: No dysuria, frequency or hematuria  NEUROLOGICAL: No numbness or weakness  SKIN: No itching, rashes     PHYSICAL EXAM:  T(C): 35.6, Max: 36.2 (05-18-20 @ 20:22)  HR: 87 (74 - 88)  BP: 125/68 (101/64 - 146/77)  RR: 18 (16 - 24)  SpO2: 94% (87% - 99%)    GENERAL: NAD, frail, using supplemental oxygen   NECK: Supple, No JVD  PULMONARY/CHEST: decreased breath sounds b/l  CARDIOVASC: Regular rate and rhythm; No murmurs  GI/ABDOMEN: Soft, Nontender, Nondistended; Bowel sounds present  EXTREMITIES:  2+ Peripheral Pulses, No clubbing, cyanosis, or edema, no deformity. No calf tenderness b/l.  SKIN: No rashes or lesions  NERVOUS SYSTEM:  Alert & Oriented X3, no focal deficit     Chart reviewed.    LABS:                        14.6   12.61 )-----------( 223      ( 19 May 2020 06:11 )             45.2   WBC Count: 12.61 K/uL (05-19)  WBC Count: 15.65 K/uL (05-17)  WBC Count: 17.04 K/uL (05-16)    05-19    140  |  102  |  40<H>  ----------------------------<  112<H>  4.7   |  18  |  1.2    Creatinine, Serum: 1.2 (05-19)  Creatinine, Serum: 1.2 (05-18)  Creatinine, Serum: 1.5 (05-17)  Ca    9.8      19 May 2020 06:11  Mg     2.0     05-19    TPro  6.1  /  Alb  3.3<L>  /  TBili  0.5  /  DBili  x   /  AST  20  /  ALT  10  /  AlkPhos  74  05-19    PT/INR - ( 18 May 2020 05:50 )   PT: 14.20 sec;   INR: 1.23 ratio         PTT - ( 19 May 2020 06:11 )  PTT:27.3 sec  Urinalysis Basic - ( 19 May 2020 05:45 )    Color: Yellow / Appearance: Clear / SG: >1.050 / pH: x  Gluc: x / Ketone: Trace  / Bili: Negative / Urobili: <2 mg/dL   Blood: x / Protein: 30 mg/dL / Nitrite: Negative   Leuk Esterase: Small / RBC: 27 /HPF / WBC 38 /HPF   Sq Epi: x / Non Sq Epi: 3 /HPF / Bacteria: Negative    RADIOLOGY & ADDITIONAL TESTS:  < from: CT Angio Chest w/ IV Cont (05.16.20 @ 09:56) >  Acute bilateral pulmonary emboli most notable within the distal portion of right main pulmonary artery, proximal left lower lobe pulmonary arteries    Severe right heart strain. RV LV ratio = 3.2.    Extensive mediastinal, bilateral hilar and right axillary adenopathy.    Small bilateral pleural effusions with interlobular septal thickening. 11 mm right upper lobe pulmonary nodule (3/167)    < end of copied text >    < from: VA Duplex Lower Ext Vein Scan, Bilat (05.17.20 @ 10:49) >  Deep venous thrombosis of the right common femoral, superficial femoral and popliteal veins. Superficial thrombophlebitis of the right gastrocnemius veins.    No evidence of DVT or superficial thrombus phlebitis left lower extremity    < end of copied text >        MEDICATIONS  (STANDING):  apixaban 10 milliGRAM(s) Oral every 12 hours  chlorhexidine 4% Liquid 1 Application(s) Topical every 12 hours  hydrocortisone sodium succinate Injectable 100 milliGRAM(s) IV Push every 8 hours  metoprolol tartrate 25 milliGRAM(s) Oral every 12 hours  pantoprazole    Tablet 40 milliGRAM(s) Oral before breakfast  senna 2 Tablet(s) Oral at bedtime    MEDICATIONS  (PRN):  ondansetron    Tablet 4 milliGRAM(s) Oral every 6 hours PRN Nausea and/or Vomiting

## 2020-05-19 NOTE — DISCHARGE NOTE PROVIDER - NSDCCPCAREPLAN_GEN_ALL_CORE_FT
PRINCIPAL DISCHARGE DIAGNOSIS  Diagnosis: Acute pulmonary embolism with acute cor pulmonale, unspecified pulmonary embolism type  Assessment and Plan of Treatment: PRINCIPAL DISCHARGE DIAGNOSIS  Diagnosis: Acute pulmonary embolism with acute cor pulmonale, unspecified pulmonary embolism type  Assessment and Plan of Treatment: During this hospitalization, you were diagnosed with a pulmonary embolsim. In your case, you have a deep vein thrombosis (DVT) which is a blood clot in a large vein deep in your right leg. The clot can separate from the vein, travel to the lungs and cut off blood flow. This is a pulmonary embolism (PE). Pulmonary embolism is very serious. Both the prevention and the treatment are similar for DVT and PE.   To help prevent more blood clots from forming, please see your primary care doctor within one week of discharge, and please take your medicines exactly as instructed. Don’t skip doses. You have been prescribed a medication called Eliquis (apixaban) to thin the blood, so that more clots do not form. Please take Eliquis 10mg twice daily for 7 days. After 7 days, please take Eliquis 5mg twice daily.  Make sure you stay active and walk for at least 30 minutes every day. When sitting for long periods of time, move your knees, ankles, feet, and toes. An IVC filter was placed to prevent clots from traveling to your lungs.  Stay at a healthy weight. Try to exercise at least 30 minutes on most days. Before starting an exercise program, talk with your primary care provider. When traveling by car, make frequent stops to get up and move around. On long airplane rides, get up and move around when possible. If you can’t get up, wiggle your toes, move your ankles and tighten your calves to keep your blood moving.  Seek immediate medical care if you have pain, swelling, and redness in your leg, arm, or other body area. These symptoms may mean another blood clot. Also call your healthcare provider if you have signs and symptoms of bleeding, like blood in your urine, bleeding with bowel movements, or bleeding from the nose, gums, a cut, or vagina. Call 911 if you have symptoms of a blood clot in the lungs including: Chest pain, trouble breathing, coughing blood, fast heartbeat, heavy or uncontrolled bleeding.      SECONDARY DISCHARGE DIAGNOSES  Diagnosis: Supraventricular tachycardia  Assessment and Plan of Treatment: Supraventricular tachycardia (SVT) is a type of abnormal heart rhythm that causes your heart to beat very quickly. A normal heart rate is 60?100 beats per minute. During an episode of SVT, your heart rate may be 150-250 beats per minute. This can make you feel light-headed and short of breath. Although SVT is usually harmless, when SVT happens often or it lasts for long periods, it can lead to heart weakness and failure. SVT can be triggered by stress, smoking, alcohol, caffeine, or stimulant drugs. Treatment may involve certain maneuvers, medicines, or electric shock (cardioversion). During this admission, your medication metoprolol was increased. Please take metoprolol 25mg twice daily. Please follow up with your cardiologist.  SEEK IMMEDIATE MEDICAL CARE IF YOU HAVE ANY OF THE FOLLOWING SYMPTOMS: chest pain, shortness of breath, dizziness/lightheadedness, or fainting. PRINCIPAL DISCHARGE DIAGNOSIS  Diagnosis: Acute pulmonary embolism with acute cor pulmonale, unspecified pulmonary embolism type  Assessment and Plan of Treatment: During this hospitalization, you were diagnosed with a pulmonary embolsim. In your case, you have a deep vein thrombosis (DVT) which is a blood clot in a large vein deep in your right leg. The clot can separate from the vein, travel to the lungs and cut off blood flow. This is a pulmonary embolism (PE). Pulmonary embolism is very serious. Both the prevention and the treatment are similar for DVT and PE.   To help prevent more blood clots from forming, please see your primary care doctor within one week of discharge, and please take your medicines exactly as instructed. Don’t skip doses. You have been prescribed a medication called Eliquis (apixaban) to thin the blood, so that more clots do not form. Please take Eliquis 10mg twice daily for 7 days. After 7 days, please take Eliquis 5mg twice daily.  Make sure you stay active and walk for at least 30 minutes every day. When sitting for long periods of time, move your knees, ankles, feet, and toes. An IVC filter was placed to prevent clots from traveling to your lungs.  Stay at a healthy weight. Try to exercise at least 30 minutes on most days. Before starting an exercise program, talk with your primary care provider. When traveling by car, make frequent stops to get up and move around. On long airplane rides, get up and move around when possible. If you can’t get up, wiggle your toes, move your ankles and tighten your calves to keep your blood moving.  Seek immediate medical care if you have pain, swelling, and redness in your leg, arm, or other body area. These symptoms may mean another blood clot. Also call your healthcare provider if you have signs and symptoms of bleeding, like blood in your urine, bleeding with bowel movements, or bleeding from the nose, gums, a cut, or vagina. Call 911 if you have symptoms of a blood clot in the lungs including: Chest pain, trouble breathing, coughing blood, fast heartbeat, heavy or uncontrolled bleeding.      SECONDARY DISCHARGE DIAGNOSES  Diagnosis: Supraventricular tachycardia  Assessment and Plan of Treatment: Supraventricular tachycardia (SVT) is a type of abnormal heart rhythm that causes your heart to beat very quickly. A normal heart rate is 60?100 beats per minute. During an episode of SVT, your heart rate may be 150-250 beats per minute. This can make you feel light-headed and short of breath. Although SVT is usually harmless, when SVT happens often or it lasts for long periods, it can lead to heart weakness and failure. SVT can be triggered by stress, smoking, alcohol, caffeine, or stimulant drugs. Treatment may involve certain maneuvers, medicines, or electric shock (cardioversion). Please continue to take metoprolol succinate 50mg once daily.  SEEK IMMEDIATE MEDICAL CARE IF YOU HAVE ANY OF THE FOLLOWING SYMPTOMS: chest pain, shortness of breath, dizziness/lightheadedness, or fainting.

## 2020-05-19 NOTE — PROGRESS NOTE ADULT - SUBJECTIVE AND OBJECTIVE BOX
CORA BRAMBILA 80y Female  MRN#: [id number]   Hospital Day: 3d    SUBJECTIVE  Patient is a 80y old Female who presents with a chief complaint of dyspnea (18 May 2020 12:48)  Currently admitted to medicine with the primary diagnosis of Acute pulmonary embolism with acute cor pulmonale, unspecified pulmonary embolism type    INTERVAL HPI AND OVERNIGHT EVENTS:  Patient was examined and seen at bedside. This morning she is resting comfortably in bed and reports no issues or overnight events. Patient had IVC filter placed yesterday by IR.    REVIEW OF SYMPTOMS:  CONSTITUTIONAL: No weakness, fevers or chills; No headaches  EYES: No visual changes, eye pain, or discharge  ENT: No vertigo; No ear pain or change in hearing; No sore throat or difficulty swallowing  NECK: No pain or stiffness  RESPIRATORY: No cough, wheezing, or hemoptysis; No shortness of breath  CARDIOVASCULAR: No chest pain or palpitations  GASTROINTESTINAL: No abdominal or epigastric pain; No nausea, vomiting, or hematemesis; No diarrhea or constipation; No melena or hematochezia  GENITOURINARY: No dysuria, frequency or hematuria  MUSCULOSKELETAL: No joint pain, no muscle pain, no weakness  NEUROLOGICAL: No numbness or weakness  SKIN: No itching or rashes    OBJECTIVE  PAST MEDICAL & SURGICAL HISTORY  Rheumatoid arthritis  Lymphoma: mets to lungs, liver and bone  H/O total knee replacement  History of hip replacement    ALLERGIES:  No Known Allergies    MEDICATIONS:  STANDING MEDICATIONS  chlorhexidine 4% Liquid 1 Application(s) Topical every 12 hours  enoxaparin Injectable 60 milliGRAM(s) SubCutaneous two times a day  hydrocortisone sodium succinate Injectable 100 milliGRAM(s) IV Push every 8 hours  metoprolol tartrate 25 milliGRAM(s) Oral every 12 hours  pantoprazole    Tablet 40 milliGRAM(s) Oral before breakfast  senna 2 Tablet(s) Oral at bedtime    PRN MEDICATIONS  ondansetron    Tablet 4 milliGRAM(s) Oral every 6 hours PRN      VITAL SIGNS: Last 24 Hours  T(C): 35.8 (19 May 2020 05:00), Max: 36.2 (18 May 2020 20:22)  T(F): 96.5 (19 May 2020 05:00), Max: 97.2 (18 May 2020 20:22)  HR: 74 (19 May 2020 05:00) (74 - 90)  BP: 146/77 (19 May 2020 05:00) (101/64 - 146/77)  BP(mean): 90 (18 May 2020 18:00) (79 - 107)  RR: 16 (19 May 2020 05:00) (16 - 29)  SpO2: 97% (18 May 2020 19:48) (97% - 99%)    LABS:                        14.6   12.61 )-----------( 223      ( 19 May 2020 06:11 )             45.2     05-19    140  |  102  |  40<H>  ----------------------------<  112<H>  4.7   |  18  |  1.2    Ca    9.8      19 May 2020 06:11  Mg     2.0     05-19    TPro  6.1  /  Alb  3.3<L>  /  TBili  0.5  /  DBili  x   /  AST  20  /  ALT  10  /  AlkPhos  74  05-19    PT/INR - ( 18 May 2020 05:50 )   PT: 14.20 sec;   INR: 1.23 ratio         PTT - ( 19 May 2020 06:11 )  PTT:27.3 sec  Urinalysis Basic - ( 19 May 2020 05:45 )    Color: Yellow / Appearance: Clear / SG: >1.050 / pH: x  Gluc: x / Ketone: Trace  / Bili: Negative / Urobili: <2 mg/dL   Blood: x / Protein: 30 mg/dL / Nitrite: Negative   Leuk Esterase: Small / RBC: 27 /HPF / WBC 38 /HPF   Sq Epi: x / Non Sq Epi: 3 /HPF / Bacteria: Negative      RADIOLOGY:  < from: Xray Chest 1 View- PORTABLE-Routine (05.18.20 @ 07:46) >  IMPRESSION:    Stable bilateral interstitial opacities.  Small bilateral pleural effusions which have increased since one day earlier.  < end of copied text >    < from: CT Abdomen and Pelvis w/ Oral Cont and w/ IV Cont (05.17.20 @ 20:34) >  FINDINGS:  LOWER CHEST: Partially imaged bibasilar pulmonary emboli. Bilateral small pleural effusions.  HEPATOBILIARY: Cholelithiasis.  SPLEEN: Indeterminate 1.1 cm splenic hypodensity.  PANCREAS: Unremarkable.  ADRENAL GLANDS: Unremarkable.  KIDNEYS: Bilateral renal enhancement. Contrast is noted within the bilateral renal collecting system obscuring evaluation for underlying stones. No hydronephrosis.  ABDOMINOPELVIC NODES: Unremarkable.  PELVIC ORGANS: Obscured secondary to streak artifact from bilateral hip prosthesis.  PERITONEUM/MESENTERY/BOWEL: Sigmoid colonic diverticulosis without evidence of diverticulitis. No evidence of bowel obstruction, free air, or ascites. Impacted stool seen within the distal colon/rectum.  BONES/SOFT TISSUES: Status post bilateral total hip replacement with heterotopic ossification adjacent to the left hip. Multilevel degenerative changes of the spine.  OTHER: Moderate atherosclerotic disease of the aorta and its branches.    IMPRESSION:   1.  No evidence of venous compression of the IVC.  2.  Partially imaged bibasilar pulmonary emboli, better seen on recent CTA.  3.  Small bilateral pleural effusions.    < end of copied text >    < from: Transthoracic Echocardiogram (05.16.20 @ 15:03) >  Summary:   1. Left ventricular ejection fraction, by visual estimation, is 55-65%.   2. Normal global left ventricular systolic function.   3. Spectral Doppler shows impaired relaxation pattern of left ventricular myocardial filling (Grade I diastolic dysfunction).   4. Right ventricular volume overload.   5. Severely enlarged right ventricle.   6. Severely reduced RV systolic function.   7. Moderate mitral annular calcification.   8. Mild mitral regurgitation.   9. Mild aortic stenosis by planimetry.  10. Trivial aortic regurgitation.  11. Mild-moderate tricuspid regurgitation.  12. Estimated pulmonary artery systolic pressure is 44.7 mmHg assuming a right atrial pressure of 8 mmHg, which is consistent with mild pulmonary hypertension.    < end of copied text >      PHYSICAL EXAM:  CONSTITUTIONAL: No acute distress, well-developed, well-groomed, AAOx3  HEAD: Atraumatic, normocephalic  EYES: EOM intact, PERRLA, conjunctiva and sclera clear  ENT: Supple, no masses, no thyromegaly, no bruits, no JVD; moist mucous membranes  PULMONARY: Clear to auscultation bilaterally; no wheezes, rales, or rhonchi  CARDIOVASCULAR: Regular rate and rhythm; no murmurs, rubs, or gallops  GASTROINTESTINAL: Soft, non-tender, non-distended; bowel sounds present  MUSCULOSKELETAL: 2+ peripheral pulses; no clubbing, no cyanosis, no edema  NEUROLOGY: non-focal  SKIN: No rashes or lesions; warm and dry    ASSESSMENT & PLAN  Patient is a 81yo female with PMH of rheumatoid arthritis, 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 In the ED, CTA chest revealed acute bilateral pulmonary emboli within the distal portion of the right main pulmonary artery and proximal left lower lobe arteries. Echocardiogram confirmed severe right heart strain with RV/LV ration 3.2. Patient was admitted to the ICU at [facility].    #Acute hypoxemic respiratory failure secondary to acute bilateral pulmonary emboli  - Patient is currently saturating 97-98% on 2L O2 via nasal cannula  - CTA chest 5/16/2020: acute bilateral pulmonary emboli most notable within the distal portion of right main pulmonary artery and proximal left lower lobe pulmonary arteries; severe right heart strain with RV/LV ratio 3.2  - Echo 5/16/2020: EF 55-65% with severely reduced RV systolic function, mild MR, mild AR, mild-moderate TR, mild pulmonary hypertension  - IR consult appreciated: not candidate for embolectomy  - VA Duplex: DVT of right common femoral, superficial femoral, and popliteal veins  - IVC filter placed by IR on 5/18/2020  - CT abdomen/pelvis: no evidence of venous compression of IVC  - Patient was previously on heparin drip, but transitioned to Lovenox  - Continue with Lovenox 60mg SQ BID  - PT/OT/rehab  - Consider transitioning from Lovenox to Eliquis on discharge  - Will check patient's oxygen saturation on room air at rest and with ambulation    #Right lower extremity DVTs  - VA Duplex: DVT of right common femoral, superficial femoral, and popliteal veins  - IVC filter placed by IR on 5/18/2020  - Patient was previously on heparin drip, but transitions to Lovenox  - Continue with Lovenox 60mg SQ BID  - CT abdomen/pelvis: no evidence of venous compression of IVC    #Atrial tachycardia with aberrant conduction via RBBB  - EP consult appreciated  - Continue with metoprolol tartrate 25mg PO BID  - Continue with telemetry    #Stage IV lymphoma with metastases to lung on immunotherapy  - [place] [facility]  - Most recent infusion was Monday the week prior to presentation    #Rheumatoid arthritis  - Continue with methotrexate once weekly  - Home medication prednisone changed to hydrocortisone for stress dose  - Continue with hydrocortisone 100mg IV Q8H    #Hypertension  - Patient takes losartan 25mg PO QD at home  - Continue with metoprolol tartrate 25mg PO BID    #Misc  - DVT Prophylaxis: Lovenox 60mg SQ BID  - GI Prophylaxis: pantoprazole 40mg PO QD   - Diet: DASH/TLC  - Activity: increase as tolerated  - IV Fluids: not indicated  - Code Status: Full Code    Dispo: pending PT/rehab

## 2020-05-19 NOTE — PROGRESS NOTE ADULT - ASSESSMENT
80 year old Female RA on prednisone and methotraxate, stage 4 lymphoma with mets to lung on immunotherapy - follows at [facility] - and HTN presented at [facility] site for nausea and dyspnea that started 3 days prior to the presentation. Pt endorses she had cancer infusion last Monday at [facility] and the next day she has been having nausea and dyspnea at rest exacerbated by exertion. Pt was diagnosed with b/l PE and admitted to ICU for monitoring.    # Acute respiratory failure with hypoxemia due to b/l PE.  # Right heart strain  # Acute R fem vein DVT  - pt underwent IVCF placement by IR  - no PE thrombolysis performed  - pt requires supplemental O2, will need home O2 upon disharge, check O2 at rest and on ambulation on RA and O2.  - started on Lovenox  - in light of hx of lymphoma will do brain MRI to r/o mets  - if no mets pt will be discharged on NOAC - Eliquis     # Lymphoma with lungs mets - treatment at [facility]    OOBTC, PT eval    prognosis guarded.

## 2020-05-20 VITALS
TEMPERATURE: 97 F | SYSTOLIC BLOOD PRESSURE: 156 MMHG | WEIGHT: 142.64 LBS | RESPIRATION RATE: 19 BRPM | HEART RATE: 79 BPM | DIASTOLIC BLOOD PRESSURE: 80 MMHG

## 2020-05-20 LAB
ANION GAP SERPL CALC-SCNC: 16 MMOL/L — HIGH (ref 7–14)
BUN SERPL-MCNC: 42 MG/DL — HIGH (ref 10–20)
CALCIUM SERPL-MCNC: 9.5 MG/DL — SIGNIFICANT CHANGE UP (ref 8.5–10.1)
CHLORIDE SERPL-SCNC: 105 MMOL/L — SIGNIFICANT CHANGE UP (ref 98–110)
CO2 SERPL-SCNC: 18 MMOL/L — SIGNIFICANT CHANGE UP (ref 17–32)
CREAT SERPL-MCNC: 1.2 MG/DL — SIGNIFICANT CHANGE UP (ref 0.7–1.5)
GLUCOSE SERPL-MCNC: 101 MG/DL — HIGH (ref 70–99)
HCT VFR BLD CALC: 43 % — SIGNIFICANT CHANGE UP (ref 37–47)
HGB BLD-MCNC: 14.2 G/DL — SIGNIFICANT CHANGE UP (ref 12–16)
MAGNESIUM SERPL-MCNC: 1.9 MG/DL — SIGNIFICANT CHANGE UP (ref 1.8–2.4)
MCHC RBC-ENTMCNC: 32.5 PG — HIGH (ref 27–31)
MCHC RBC-ENTMCNC: 33 G/DL — SIGNIFICANT CHANGE UP (ref 32–37)
MCV RBC AUTO: 98.4 FL — SIGNIFICANT CHANGE UP (ref 81–99)
NRBC # BLD: 0 /100 WBCS — SIGNIFICANT CHANGE UP (ref 0–0)
PLATELET # BLD AUTO: 210 K/UL — SIGNIFICANT CHANGE UP (ref 130–400)
POTASSIUM SERPL-MCNC: 4.5 MMOL/L — SIGNIFICANT CHANGE UP (ref 3.5–5)
POTASSIUM SERPL-SCNC: 4.5 MMOL/L — SIGNIFICANT CHANGE UP (ref 3.5–5)
RBC # BLD: 4.37 M/UL — SIGNIFICANT CHANGE UP (ref 4.2–5.4)
RBC # FLD: 12.2 % — SIGNIFICANT CHANGE UP (ref 11.5–14.5)
SARS-COV-2 RNA SPEC QL NAA+PROBE: SIGNIFICANT CHANGE UP
SODIUM SERPL-SCNC: 139 MMOL/L — SIGNIFICANT CHANGE UP (ref 135–146)
WBC # BLD: 15.2 K/UL — HIGH (ref 4.8–10.8)
WBC # FLD AUTO: 15.2 K/UL — HIGH (ref 4.8–10.8)

## 2020-05-20 PROCEDURE — 99239 HOSP IP/OBS DSCHRG MGMT >30: CPT

## 2020-05-20 RX ORDER — METHOTREXATE 2.5 MG/1
0 TABLET ORAL
Qty: 0 | Refills: 0 | DISCHARGE

## 2020-05-20 RX ORDER — HYDROCORTISONE 20 MG
100 TABLET ORAL EVERY 12 HOURS
Refills: 0 | Status: DISCONTINUED | OUTPATIENT
Start: 2020-05-20 | End: 2020-05-20

## 2020-05-20 RX ADMIN — Medication 100 MILLIGRAM(S): at 05:38

## 2020-05-20 RX ADMIN — Medication 25 MILLIGRAM(S): at 05:38

## 2020-05-20 RX ADMIN — APIXABAN 10 MILLIGRAM(S): 2.5 TABLET, FILM COATED ORAL at 05:38

## 2020-05-20 RX ADMIN — PANTOPRAZOLE SODIUM 40 MILLIGRAM(S): 20 TABLET, DELAYED RELEASE ORAL at 05:38

## 2020-05-20 RX ADMIN — CHLORHEXIDINE GLUCONATE 1 APPLICATION(S): 213 SOLUTION TOPICAL at 05:39

## 2020-05-20 NOTE — PROGRESS NOTE ADULT - ASSESSMENT
IMPRESSION:    VTE   HO Lymphoma on immunotherapy at [facility]  Probable PHTN   Extensive DVT SP GFF     PLAN:    CNS: no depressants.     HEENT: Oral care    PULMONARY:  HOB @ 45 degrees.  Aspiration precautions     CARDIOVASCULAR:  I= O  Avoid volume overload.     GI: GI prophylaxis.  feeding  Bowel regimen     RENAL:  Follow up lytes.  Correct as needed    INFECTIOUS DISEASE: Follow up cultures    HEMATOLOGICAL:  DVT therapy for now.  Can switch to Eliquis     ENDOCRINE:  Follow up FS.      MUSCULOSKELETAL:  OOB  to chair     OP Pulm follow up

## 2020-05-20 NOTE — CHART NOTE - NSCHARTNOTEFT_GEN_A_CORE
RESIDENT DISCHARGE NOTE     Patient Name: CORA BRAMBILA  MRN-[id number]    VITAL SIGNS:  T(F): 96.7 (05-20-20 @ 06:34), Max: 96.7 (05-20-20 @ 06:34)  HR: 79 (05-20-20 @ 06:34)  BP: 156/80 (05-20-20 @ 06:34)  SpO2: 96% (05-19-20 @ 19:44)    PHYSICAL EXAMINATION:  CONSTITUTIONAL: No acute distress, elderly, frail, AAOx3  HEAD: Atraumatic, normocephalic  EYES: EOM intact, PERRLA, conjunctiva and sclera clear  ENT: Supple, no masses, no thyromegaly, no bruits, no JVD; moist mucous membranes  PULMONARY: Clear to auscultation bilaterally; no wheezes, rales, or rhonchi  CARDIOVASCULAR: Regular rate and rhythm; no murmurs, rubs, or gallops  GASTROINTESTINAL: Soft, non-tender, non-distended; bowel sounds present  MUSCULOSKELETAL: 2+ peripheral pulses; no clubbing, no cyanosis, no edema  NEUROLOGY: non-focal  SKIN: No rashes or lesions; warm and dry    TEST RESULTS:                        14.2   15.20 )-----------( 210      ( 20 May 2020 06:22 )             43.0     05-20    139  |  105  |  42<H>  ----------------------------<  101<H>  4.5   |  18  |  1.2    Ca    9.5      20 May 2020 06:22  Mg     1.9     05-20    TPro  6.1  /  Alb  3.3<L>  /  TBili  0.5  /  DBili  x   /  AST  20  /  ALT  10  /  AlkPhos  74  05-19      FINAL DISCHARGE INTERVIEW:  Resident Present: Shailesh Costa D.O.    DISCHARGE MEDICATION RECONCILIATION:  Reviewed with Attending (Name: Dr. Watson Martino)    DISPOSITION:  [  ] Home  [X] Home with Visiting Nursing Services  [  ] SNF/ NH  [  ] Acute Rehab (4A)  [  ] Other (Specify: _____ )

## 2020-05-20 NOTE — PROGRESS NOTE ADULT - SUBJECTIVE AND OBJECTIVE BOX
Patient is a 80y old  Female who presents with a chief complaint of dyspnea (19 May 2020 17:04)        Over Night Events:  no new complaints.  Resting comfortable in bed.  on RA          ROS:     All ROS are negative except HPI         PHYSICAL EXAM    ICU Vital Signs Last 24 Hrs  T(C): 35.9 (20 May 2020 06:34), Max: 35.9 (20 May 2020 06:34)  T(F): 96.7 (20 May 2020 06:34), Max: 96.7 (20 May 2020 06:34)  HR: 79 (20 May 2020 06:34) (79 - 95)  BP: 156/80 (20 May 2020 06:34) (116/67 - 156/80)  BP(mean): --  ABP: --  ABP(mean): --  RR: 19 (20 May 2020 06:34) (16 - 19)  SpO2: 96% (19 May 2020 19:44) (87% - 97%)      CONSTITUTIONAL:  Well nourished.  NAD    ENT:   Airway patent,   Mouth with normal mucosa.   No thrush    EYES:   Pupils equal,   Round and reactive to light.    CARDIAC:   Normal rate,   Regular rhythm.    No edema      Vascular:  Normal systolic impulse  No Carotid bruits    RESPIRATORY:   No wheezing  Bilateral BS  Normal chest expansion  Not tachypneic,  No use of accessory muscles    GASTROINTESTINAL:  Abdomen soft,   Non-tender,   No guarding,   + BS    GENITOURINARY  normal genitalia for sex  no edema    MUSCULOSKELETAL:   Range of motion is not limited,  No clubbing, cyanosis    NEUROLOGICAL:   Alert and oriented   No motor  deficits.    SKIN:   Skin normal color for race,   Warm and dry and intact.   No evidence of rash.    PSYCHIATRIC:   Normal mood and affect.   No apparent risk to self or others.    HEMATOLOGICAL:  No cervical  lymphadenopathy.  no inguinal lymphadenopathy      05-19-20 @ 07:01  -  05-20-20 @ 07:00  --------------------------------------------------------  IN:    Oral Fluid: 810 mL  Total IN: 810 mL    OUT:  Total OUT: 0 mL    Total NET: 810 mL      05-20-20 @ 07:01  -  05-20-20 @ 09:32  --------------------------------------------------------  IN:    Oral Fluid: 210 mL  Total IN: 210 mL    OUT:  Total OUT: 0 mL    Total NET: 210 mL          LABS:                            14.2   15.20 )-----------( 210      ( 20 May 2020 06:22 )             43.0                                               05-20    139  |  105  |  42<H>  ----------------------------<  101<H>  4.5   |  18  |  1.2    Ca    9.5      20 May 2020 06:22  Mg     1.9     05-20    TPro  6.1  /  Alb  3.3<L>  /  TBili  0.5  /  DBili  x   /  AST  20  /  ALT  10  /  AlkPhos  74  05-19      PTT - ( 19 May 2020 06:11 )  PTT:27.3 sec                                       Urinalysis Basic - ( 19 May 2020 05:45 )    Color: Yellow / Appearance: Clear / SG: >1.050 / pH: x  Gluc: x / Ketone: Trace  / Bili: Negative / Urobili: <2 mg/dL   Blood: x / Protein: 30 mg/dL / Nitrite: Negative   Leuk Esterase: Small / RBC: 27 /HPF / WBC 38 /HPF   Sq Epi: x / Non Sq Epi: 3 /HPF / Bacteria: Negative                                                  LIVER FUNCTIONS - ( 19 May 2020 06:11 )  Alb: 3.3 g/dL / Pro: 6.1 g/dL / ALK PHOS: 74 U/L / ALT: 10 U/L / AST: 20 U/L / GGT: x                                                                                                                                       MEDICATIONS  (STANDING):  apixaban 10 milliGRAM(s) Oral every 12 hours  chlorhexidine 4% Liquid 1 Application(s) Topical every 12 hours  hydrocortisone sodium succinate Injectable 100 milliGRAM(s) IV Push every 12 hours  metoprolol tartrate 25 milliGRAM(s) Oral every 12 hours  pantoprazole    Tablet 40 milliGRAM(s) Oral before breakfast  senna 2 Tablet(s) Oral at bedtime    MEDICATIONS  (PRN):  ondansetron    Tablet 4 milliGRAM(s) Oral every 6 hours PRN Nausea and/or Vomiting      New X-rays reviewed:                                                                                  ECHO    CXR interpreted by me:

## 2020-05-21 LAB
CULTURE RESULTS: SIGNIFICANT CHANGE UP
CULTURE RESULTS: SIGNIFICANT CHANGE UP
SPECIMEN SOURCE: SIGNIFICANT CHANGE UP
SPECIMEN SOURCE: SIGNIFICANT CHANGE UP

## 2020-05-26 DIAGNOSIS — I27.20 PULMONARY HYPERTENSION, UNSPECIFIED: ICD-10-CM

## 2020-05-26 DIAGNOSIS — C78.7 SECONDARY MALIGNANT NEOPLASM OF LIVER AND INTRAHEPATIC BILE DUCT: ICD-10-CM

## 2020-05-26 DIAGNOSIS — C79.51 SECONDARY MALIGNANT NEOPLASM OF BONE: ICD-10-CM

## 2020-05-26 DIAGNOSIS — C85.90 NON-HODGKIN LYMPHOMA, UNSPECIFIED, UNSPECIFIED SITE: ICD-10-CM

## 2020-05-26 DIAGNOSIS — Z96.652 PRESENCE OF LEFT ARTIFICIAL KNEE JOINT: ICD-10-CM

## 2020-05-26 DIAGNOSIS — I82.411 ACUTE EMBOLISM AND THROMBOSIS OF RIGHT FEMORAL VEIN: ICD-10-CM

## 2020-05-26 DIAGNOSIS — I10 ESSENTIAL (PRIMARY) HYPERTENSION: ICD-10-CM

## 2020-05-26 DIAGNOSIS — Z87.891 PERSONAL HISTORY OF NICOTINE DEPENDENCE: ICD-10-CM

## 2020-05-26 DIAGNOSIS — I82.431 ACUTE EMBOLISM AND THROMBOSIS OF RIGHT POPLITEAL VEIN: ICD-10-CM

## 2020-05-26 DIAGNOSIS — M06.9 RHEUMATOID ARTHRITIS, UNSPECIFIED: ICD-10-CM

## 2020-05-26 DIAGNOSIS — Z96.643 PRESENCE OF ARTIFICIAL HIP JOINT, BILATERAL: ICD-10-CM

## 2020-05-26 DIAGNOSIS — R06.00 DYSPNEA, UNSPECIFIED: ICD-10-CM

## 2020-05-26 DIAGNOSIS — I26.09 OTHER PULMONARY EMBOLISM WITH ACUTE COR PULMONALE: ICD-10-CM

## 2020-05-26 DIAGNOSIS — I47.1 SUPRAVENTRICULAR TACHYCARDIA: ICD-10-CM

## 2020-05-26 DIAGNOSIS — C78.00 SECONDARY MALIGNANT NEOPLASM OF UNSPECIFIED LUNG: ICD-10-CM

## 2020-05-26 DIAGNOSIS — J96.01 ACUTE RESPIRATORY FAILURE WITH HYPOXIA: ICD-10-CM

## 2020-05-26 DIAGNOSIS — I45.10 UNSPECIFIED RIGHT BUNDLE-BRANCH BLOCK: ICD-10-CM

## 2020-06-01 ENCOUNTER — INPATIENT (INPATIENT)
Facility: HOSPITAL | Age: 80
LOS: 10 days | Discharge: HOSPICE | End: 2020-06-12
Attending: HOSPITALIST | Admitting: HOSPITALIST
Payer: MEDICARE

## 2020-06-01 VITALS
HEART RATE: 91 BPM | TEMPERATURE: 97 F | DIASTOLIC BLOOD PRESSURE: 74 MMHG | OXYGEN SATURATION: 98 % | RESPIRATION RATE: 18 BRPM | SYSTOLIC BLOOD PRESSURE: 112 MMHG

## 2020-06-01 DIAGNOSIS — C43.9 MALIGNANT MELANOMA OF SKIN, UNSPECIFIED: ICD-10-CM

## 2020-06-01 DIAGNOSIS — Z92.21 PERSONAL HISTORY OF ANTINEOPLASTIC CHEMOTHERAPY: ICD-10-CM

## 2020-06-01 DIAGNOSIS — I26.99 OTHER PULMONARY EMBOLISM WITHOUT ACUTE COR PULMONALE: ICD-10-CM

## 2020-06-01 DIAGNOSIS — T38.0X5A ADVERSE EFFECT OF GLUCOCORTICOIDS AND SYNTHETIC ANALOGUES, INITIAL ENCOUNTER: ICD-10-CM

## 2020-06-01 DIAGNOSIS — J91.8 PLEURAL EFFUSION IN OTHER CONDITIONS CLASSIFIED ELSEWHERE: ICD-10-CM

## 2020-06-01 DIAGNOSIS — Z96.659 PRESENCE OF UNSPECIFIED ARTIFICIAL KNEE JOINT: Chronic | ICD-10-CM

## 2020-06-01 DIAGNOSIS — C79.51 SECONDARY MALIGNANT NEOPLASM OF BONE: ICD-10-CM

## 2020-06-01 DIAGNOSIS — N18.3 CHRONIC KIDNEY DISEASE, STAGE 3 (MODERATE): ICD-10-CM

## 2020-06-01 DIAGNOSIS — J96.01 ACUTE RESPIRATORY FAILURE WITH HYPOXIA: ICD-10-CM

## 2020-06-01 DIAGNOSIS — D72.829 ELEVATED WHITE BLOOD CELL COUNT, UNSPECIFIED: ICD-10-CM

## 2020-06-01 DIAGNOSIS — R62.7 ADULT FAILURE TO THRIVE: ICD-10-CM

## 2020-06-01 DIAGNOSIS — E83.42 HYPOMAGNESEMIA: ICD-10-CM

## 2020-06-01 DIAGNOSIS — Z79.01 LONG TERM (CURRENT) USE OF ANTICOAGULANTS: ICD-10-CM

## 2020-06-01 DIAGNOSIS — Z86.711 PERSONAL HISTORY OF PULMONARY EMBOLISM: ICD-10-CM

## 2020-06-01 DIAGNOSIS — E87.2 ACIDOSIS: ICD-10-CM

## 2020-06-01 DIAGNOSIS — M06.9 RHEUMATOID ARTHRITIS, UNSPECIFIED: ICD-10-CM

## 2020-06-01 DIAGNOSIS — I47.1 SUPRAVENTRICULAR TACHYCARDIA: ICD-10-CM

## 2020-06-01 DIAGNOSIS — C78.7 SECONDARY MALIGNANT NEOPLASM OF LIVER AND INTRAHEPATIC BILE DUCT: ICD-10-CM

## 2020-06-01 DIAGNOSIS — Z51.5 ENCOUNTER FOR PALLIATIVE CARE: ICD-10-CM

## 2020-06-01 DIAGNOSIS — Z96.649 PRESENCE OF UNSPECIFIED ARTIFICIAL HIP JOINT: ICD-10-CM

## 2020-06-01 DIAGNOSIS — I12.9 HYPERTENSIVE CHRONIC KIDNEY DISEASE WITH STAGE 1 THROUGH STAGE 4 CHRONIC KIDNEY DISEASE, OR UNSPECIFIED CHRONIC KIDNEY DISEASE: ICD-10-CM

## 2020-06-01 DIAGNOSIS — E86.0 DEHYDRATION: ICD-10-CM

## 2020-06-01 DIAGNOSIS — Z66 DO NOT RESUSCITATE: ICD-10-CM

## 2020-06-01 DIAGNOSIS — Z96.659 PRESENCE OF UNSPECIFIED ARTIFICIAL KNEE JOINT: ICD-10-CM

## 2020-06-01 DIAGNOSIS — F41.9 ANXIETY DISORDER, UNSPECIFIED: ICD-10-CM

## 2020-06-01 DIAGNOSIS — G92 TOXIC ENCEPHALOPATHY: ICD-10-CM

## 2020-06-01 DIAGNOSIS — R45.1 RESTLESSNESS AND AGITATION: ICD-10-CM

## 2020-06-01 DIAGNOSIS — T45.1X5A ADVERSE EFFECT OF ANTINEOPLASTIC AND IMMUNOSUPPRESSIVE DRUGS, INITIAL ENCOUNTER: ICD-10-CM

## 2020-06-01 DIAGNOSIS — Z79.52 LONG TERM (CURRENT) USE OF SYSTEMIC STEROIDS: ICD-10-CM

## 2020-06-01 DIAGNOSIS — C78.00 SECONDARY MALIGNANT NEOPLASM OF UNSPECIFIED LUNG: ICD-10-CM

## 2020-06-01 DIAGNOSIS — Z96.649 PRESENCE OF UNSPECIFIED ARTIFICIAL HIP JOINT: Chronic | ICD-10-CM

## 2020-06-01 DIAGNOSIS — J70.4 DRUG-INDUCED INTERSTITIAL LUNG DISORDERS, UNSPECIFIED: ICD-10-CM

## 2020-06-01 PROBLEM — C85.90 NON-HODGKIN LYMPHOMA, UNSPECIFIED, UNSPECIFIED SITE: Chronic | Status: ACTIVE | Noted: 2020-05-16

## 2020-06-01 LAB
ALBUMIN SERPL ELPH-MCNC: 3.5 G/DL — SIGNIFICANT CHANGE UP (ref 3.5–5.2)
ALP SERPL-CCNC: 68 U/L — SIGNIFICANT CHANGE UP (ref 30–115)
ALT FLD-CCNC: 16 U/L — SIGNIFICANT CHANGE UP (ref 0–41)
ANION GAP SERPL CALC-SCNC: 18 MMOL/L — HIGH (ref 7–14)
APTT BLD: 27.4 SEC — SIGNIFICANT CHANGE UP (ref 27–39.2)
AST SERPL-CCNC: 24 U/L — SIGNIFICANT CHANGE UP (ref 0–41)
BASOPHILS # BLD AUTO: 0.04 K/UL — SIGNIFICANT CHANGE UP (ref 0–0.2)
BASOPHILS NFR BLD AUTO: 0.3 % — SIGNIFICANT CHANGE UP (ref 0–1)
BILIRUB SERPL-MCNC: 0.6 MG/DL — SIGNIFICANT CHANGE UP (ref 0.2–1.2)
BUN SERPL-MCNC: 34 MG/DL — HIGH (ref 10–20)
CALCIUM SERPL-MCNC: 9.6 MG/DL — SIGNIFICANT CHANGE UP (ref 8.5–10.1)
CHLORIDE SERPL-SCNC: 107 MMOL/L — SIGNIFICANT CHANGE UP (ref 98–110)
CO2 SERPL-SCNC: 19 MMOL/L — SIGNIFICANT CHANGE UP (ref 17–32)
CREAT SERPL-MCNC: 1.2 MG/DL — SIGNIFICANT CHANGE UP (ref 0.7–1.5)
EOSINOPHIL # BLD AUTO: 0.01 K/UL — SIGNIFICANT CHANGE UP (ref 0–0.7)
EOSINOPHIL NFR BLD AUTO: 0.1 % — SIGNIFICANT CHANGE UP (ref 0–8)
GLUCOSE SERPL-MCNC: 92 MG/DL — SIGNIFICANT CHANGE UP (ref 70–99)
HCT VFR BLD CALC: 43.1 % — SIGNIFICANT CHANGE UP (ref 37–47)
HGB BLD-MCNC: 14 G/DL — SIGNIFICANT CHANGE UP (ref 12–16)
IMM GRANULOCYTES NFR BLD AUTO: 1.1 % — HIGH (ref 0.1–0.3)
INR BLD: 1.4 RATIO — HIGH (ref 0.65–1.3)
LYMPHOCYTES # BLD AUTO: 0.57 K/UL — LOW (ref 1.2–3.4)
LYMPHOCYTES # BLD AUTO: 4.8 % — LOW (ref 20.5–51.1)
MCHC RBC-ENTMCNC: 31.5 PG — HIGH (ref 27–31)
MCHC RBC-ENTMCNC: 32.5 G/DL — SIGNIFICANT CHANGE UP (ref 32–37)
MCV RBC AUTO: 97.1 FL — SIGNIFICANT CHANGE UP (ref 81–99)
MONOCYTES # BLD AUTO: 0.33 K/UL — SIGNIFICANT CHANGE UP (ref 0.1–0.6)
MONOCYTES NFR BLD AUTO: 2.8 % — SIGNIFICANT CHANGE UP (ref 1.7–9.3)
NEUTROPHILS # BLD AUTO: 10.86 K/UL — HIGH (ref 1.4–6.5)
NEUTROPHILS NFR BLD AUTO: 90.9 % — HIGH (ref 42.2–75.2)
NRBC # BLD: 0 /100 WBCS — SIGNIFICANT CHANGE UP (ref 0–0)
PLATELET # BLD AUTO: 245 K/UL — SIGNIFICANT CHANGE UP (ref 130–400)
POTASSIUM SERPL-MCNC: 4.3 MMOL/L — SIGNIFICANT CHANGE UP (ref 3.5–5)
POTASSIUM SERPL-SCNC: 4.3 MMOL/L — SIGNIFICANT CHANGE UP (ref 3.5–5)
PROT SERPL-MCNC: 5.6 G/DL — LOW (ref 6–8)
PROTHROM AB SERPL-ACNC: 16.1 SEC — HIGH (ref 9.95–12.87)
RBC # BLD: 4.44 M/UL — SIGNIFICANT CHANGE UP (ref 4.2–5.4)
RBC # FLD: 14.6 % — HIGH (ref 11.5–14.5)
SARS-COV-2 RNA SPEC QL NAA+PROBE: SIGNIFICANT CHANGE UP
SODIUM SERPL-SCNC: 144 MMOL/L — SIGNIFICANT CHANGE UP (ref 135–146)
T3 SERPL-MCNC: 58 NG/DL — LOW (ref 80–200)
T4 AB SER-ACNC: 8 UG/DL — SIGNIFICANT CHANGE UP (ref 4.6–12)
TROPONIN T SERPL-MCNC: 0.03 NG/ML — CRITICAL HIGH
TROPONIN T SERPL-MCNC: 0.03 NG/ML — CRITICAL HIGH
TSH SERPL-MCNC: 0.97 UIU/ML — SIGNIFICANT CHANGE UP (ref 0.27–4.2)
WBC # BLD: 11.94 K/UL — HIGH (ref 4.8–10.8)
WBC # FLD AUTO: 11.94 K/UL — HIGH (ref 4.8–10.8)

## 2020-06-01 PROCEDURE — 71045 X-RAY EXAM CHEST 1 VIEW: CPT | Mod: 26

## 2020-06-01 PROCEDURE — 99285 EMERGENCY DEPT VISIT HI MDM: CPT | Mod: CS

## 2020-06-01 PROCEDURE — 70450 CT HEAD/BRAIN W/O DYE: CPT | Mod: 26

## 2020-06-01 PROCEDURE — 99223 1ST HOSP IP/OBS HIGH 75: CPT

## 2020-06-01 RX ORDER — ONDANSETRON 8 MG/1
0 TABLET, FILM COATED ORAL
Qty: 0 | Refills: 0 | DISCHARGE

## 2020-06-01 RX ORDER — SODIUM CHLORIDE 9 MG/ML
1000 INJECTION INTRAMUSCULAR; INTRAVENOUS; SUBCUTANEOUS
Refills: 0 | Status: DISCONTINUED | OUTPATIENT
Start: 2020-06-01 | End: 2020-06-03

## 2020-06-01 RX ORDER — APIXABAN 2.5 MG/1
5 TABLET, FILM COATED ORAL EVERY 12 HOURS
Refills: 0 | Status: DISCONTINUED | OUTPATIENT
Start: 2020-06-01 | End: 2020-06-03

## 2020-06-01 RX ORDER — CHLORHEXIDINE GLUCONATE 213 G/1000ML
1 SOLUTION TOPICAL
Refills: 0 | Status: DISCONTINUED | OUTPATIENT
Start: 2020-06-01 | End: 2020-06-12

## 2020-06-01 RX ORDER — LISINOPRIL 2.5 MG/1
10 TABLET ORAL DAILY
Refills: 0 | Status: DISCONTINUED | OUTPATIENT
Start: 2020-06-01 | End: 2020-06-06

## 2020-06-01 RX ORDER — METOPROLOL TARTRATE 50 MG
50 TABLET ORAL DAILY
Refills: 0 | Status: DISCONTINUED | OUTPATIENT
Start: 2020-06-01 | End: 2020-06-02

## 2020-06-01 RX ORDER — ACETAMINOPHEN 500 MG
650 TABLET ORAL EVERY 6 HOURS
Refills: 0 | Status: DISCONTINUED | OUTPATIENT
Start: 2020-06-01 | End: 2020-06-12

## 2020-06-01 RX ORDER — OXYCODONE HYDROCHLORIDE 5 MG/1
0 TABLET ORAL
Qty: 0 | Refills: 0 | DISCHARGE

## 2020-06-01 RX ORDER — PANTOPRAZOLE SODIUM 20 MG/1
40 TABLET, DELAYED RELEASE ORAL
Refills: 0 | Status: DISCONTINUED | OUTPATIENT
Start: 2020-06-01 | End: 2020-06-12

## 2020-06-01 RX ADMIN — SODIUM CHLORIDE 60 MILLILITER(S): 9 INJECTION INTRAMUSCULAR; INTRAVENOUS; SUBCUTANEOUS at 20:07

## 2020-06-01 NOTE — H&P ADULT - HISTORY OF PRESENT ILLNESS
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 Echocardiogram confirmed severe right heart strain with RV/LV ration 3.2. Patient was admitted to the ICU at [facility]. Patient was evaluated by IR, who deemed that the patient was not a candidate for embolectomy. An IVC filter was placed on 5/18/2020 and the patient was fully anticoagulated. Outpatient MRI head 1 week prior to admission showed no evidence of brain metastases. Patient will be discharged on Eliquis. Patient now saturating 97% on room air while ambulating. 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    ON LAST ADMISSION 3 WEEKS AGO :     CTA chest revealed acute bilateral pulmonary emboli within the distal portion of the right main pulmonary artery and proximal left lower lobe arteries. Echocardiogram confirmed severe right heart strain with RV/LV ration 3.2. Patient was admitted to the ICU at [facility]. Patient was evaluated by IR, who deemed that the patient was not a candidate for embolectomy. An IVC filter was placed on 5/18/2020 and the patient was fully anticoagulated. Outpatient MRI head 1 week prior to admission showed no evidence of brain metastases. Patient will be discharged on Eliquis. Patient now saturating 97% on room air while ambulating. FULL CODE    UNABLE TO GET ANY HISTORY FROM PT.  CALLED  OVER THE PHONE.     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    Family called EMS due to increase confusion over the past 3 days. At night time her mental status would be the worse.   Not ambulating at all, mainly in bed all day.     Last chemo was 4 weeks ago , prior to the last admission.   No longer taking methotrexate for her RA    ON LAST ADMISSION 3 WEEKS AGO :     CTA chest revealed acute bilateral pulmonary emboli within the distal portion of the right main pulmonary artery and proximal left lower lobe arteries. Echocardiogram confirmed severe right heart strain with RV/LV ration 3.2. Patient was admitted to the ICU at [facility]. Patient was evaluated by IR, who deemed that the patient was not a candidate for embolectomy. An IVC filter was placed on 5/18/2020 and the patient was fully anticoagulated. Outpatient MRI head 1 week prior to admission showed no evidence of brain metastases. Patient will be discharged on Eliquis. Patient now saturating 97% on room air while ambulating.

## 2020-06-01 NOTE — H&P ADULT - ASSESSMENT
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  with recent PE discharge to home MAY 21st 2020 from [facility]     FULL CODE    UNABLE TO GET ANY HISTORY FROM PT.  CALLED  OVER THE PHONE.     [facility] WHO IS A DOCTOR IN [place]: [phone]    # FAILURE TO THRIVE WITH CHANGE IN MENTAL STATUS  - r/o infection send for U/A urine cx blood cx   - ct head with unremarkable findings  - f/u TFT and cortisol levels   - neuro consult   - calorie count   - add ensure to diet   - PT consult     # B/L PE ( has IVCF placed  )   - c/w eliquis BID   - monitor for any bleeding     # RA  - c/w prednisone PO   - routine labs in am     # lymphoma with mets (  none in brain  )  - f/u with [facility] as outpt   # HTN   - c/w ACEI and BB  - monitor vitals    DVT/ GI prophylaxis    case d/w Dr. 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  with recent PE discharge to home MAY 21st 2020 from [facility]     FULL CODE    UNABLE TO GET ANY HISTORY FROM PT.  CALLED  OVER THE PHONE.     [facility] WHO IS A DOCTOR IN [place]: [phone]    # FAILURE TO THRIVE WITH CHANGE IN MENTAL STATUS  - r/o infection send for U/A urine cx blood cx   - ct head with unremarkable findings  - f/u TFT and cortisol levels   - calorie count   - add ensure to diet   - PT consult     # B/L PE ( has IVCF placed  )   - c/w eliquis BID   - monitor for any bleeding     # RA  - c/w prednisone PO   - routine labs in am     # lymphoma with mets (  none in brain  )  - f/u with [facility] as outpt   # HTN   - c/w ACEI and BB  - monitor vitals    DVT/ GI prophylaxis    case d/w Dr. Palomares

## 2020-06-01 NOTE — ED PROVIDER NOTE - CARE PLAN
Principal Discharge DX:	Lymphoma  Secondary Diagnosis:	AMS (altered mental status)  Secondary Diagnosis:	Failure to thrive in adult

## 2020-06-01 NOTE — ED PROVIDER NOTE - CLINICAL SUMMARY MEDICAL DECISION MAKING FREE TEXT BOX
80yF  pmhx  stage 4 lymphoma -  followed at [place] - mets to lung, MRI 3 weeks ago  - no brain mets, RA - no longer on MTx on prednisone only -   recent admission 5/16-5/19:  dxd PE  : started on Eliquis -  lives with  family  -  presents with progressive  weakness, dehydration,  unable to walk without assistance 2/2 weakness,  family noted some confusion ( unable to  describe),  hx  from son in law  who is MD -  contacted  [employer]  -  who  requests  cortisol and thyroid panel - as  patient was hypotensive   - in ED  pt  normotensive  afebrile  labs reviewed  dehydrated  CT head  no acute findings,  trop .03 - no chest pain   EKG no ischemia - pt admitted to  medicine

## 2020-06-01 NOTE — H&P ADULT - NSHPLABSRESULTS_GEN_ALL_CORE
06-01    144  |  107  |  34<H>  ----------------------------<  92  4.3   |  19  |  1.2    Ca    9.6      01 Jun 2020 16:39    TPro  5.6<L>  /  Alb  3.5  /  TBili  0.6  /  DBili  x   /  AST  24  /  ALT  16  /  AlkPhos  68  06-01                            14.0   11.94 )-----------( 245      ( 01 Jun 2020 16:39 )             43.1     CAPILLARY BLOOD GLUCOSE      POCT Blood Glucose.: 108 mg/dL (01 Jun 2020 14:13)    CARDIAC MARKERS ( 01 Jun 2020 16:39 )  x     / 0.03 ng/mL / x     / x     / x        < from: CT Head No Cont (06.01.20 @ 15:54) >    EXAM:  CT BRAIN            PROCEDURE DATE:  06/01/2020            INTERPRETATION:  Clinical History / Reason for exam: Weakness    Technique: Axial noncontrast contiguous images obtained from the vertex to the base the skull with sagittal and coronal reformations.    Comparison studies: None    Findings:    The ventricles and sulci are appropriate for the patient's age.    There is no evidence for intracranial hemorrhage, significant space occupying process or recent territorial infarction. Gray-white differentiation is maintained    Patchy microvascular ischemic changes demonstrated, moderate in degree.    The bones and sinuses are unremarkable.    Impression:    Unremarkable study.      < end of copied text > 06-01    144  |  107  |  34<H>  ----------------------------<  92  4.3   |  19  |  1.2    Ca    9.6      01 Jun 2020 16:39    TPro  5.6<L>  /  Alb  3.5  /  TBili  0.6  /  DBili  x   /  AST  24  /  ALT  16  /  AlkPhos  68  06-01                            14.0   11.94 )-----------( 245      ( 01 Jun 2020 16:39 )             43.1     CAPILLARY BLOOD GLUCOSE  POCT Blood Glucose.: 108 mg/dL (01 Jun 2020 14:13)    CARDIAC MARKERS ( 01 Jun 2020 16:39 )  x     / 0.03 ng/mL / x     / x     / x        < from: CT Head No Cont (06.01.20 @ 15:54) >    EXAM:  CT BRAIN            PROCEDURE DATE:  06/01/2020            INTERPRETATION:  Clinical History / Reason for exam: Weakness    Technique: Axial noncontrast contiguous images obtained from the vertex to the base the skull with sagittal and coronal reformations.    Comparison studies: None    Findings:    The ventricles and sulci are appropriate for the patient's age.    There is no evidence for intracranial hemorrhage, significant space occupying process or recent territorial infarction. Gray-white differentiation is maintained    Patchy microvascular ischemic changes demonstrated, moderate in degree.    The bones and sinuses are unremarkable.    Impression:    Unremarkable study.      < end of copied text >

## 2020-06-01 NOTE — H&P ADULT - ATTENDING COMMENTS
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  with recent PE discharge to home MAY 21st 2020 from [facility]     Metabolic encephalopathy - etiology unknown  -check ua and culture  -follow up blood cultures  -check tsh  -PT eval  -oob to chair  -gentle hydration    PE s/p filter  -on eliquis    CKDIII  -at baseline    HTN  -stable on current tx - lisinopril/metoprolol     Leukocytosis  -suspected to be due to prednisone pt takes for RA    Rheumatoid Arthritis  -on prednisone     metastatic lymphoma  -pt follows up at [facility]  -pain control    FULL CODE AS PER PT'S

## 2020-06-01 NOTE — ED ADULT TRIAGE NOTE - CHIEF COMPLAINT QUOTE
[facility] states pt was treated for PE last week. Pt has cancer, pt has not been eating, weakness, altered mental status. EMS inserted RAC # 20 . 300 cc nS given as per EMS

## 2020-06-01 NOTE — H&P ADULT - NSICDXPASTMEDICALHX_GEN_ALL_CORE_FT
PAST MEDICAL HISTORY:  Lymphoma mets to lungs, liver and bone    Rheumatoid arthritis PAST MEDICAL HISTORY:  HTN (hypertension)     Lymphoma mets to lungs, liver and bone    Rheumatoid arthritis

## 2020-06-01 NOTE — H&P ADULT - NSHPPHYSICALEXAM_GEN_ALL_CORE
Vital Signs Last 24 Hrs  T(C): 37 (01 Jun 2020 14:36), Max: 37 (01 Jun 2020 14:36)  T(F): 98.6 (01 Jun 2020 14:36), Max: 98.6 (01 Jun 2020 14:36)  HR: 86 (01 Jun 2020 17:10) (86 - 91)  BP: 119/81 (01 Jun 2020 17:10) (112/74 - 119/81)  RR: 18 (01 Jun 2020 14:10) (18 - 18)  SpO2: 96% (01 Jun 2020 17:30) (96% - 98%)      GENERAL: NAD, lying in bed comfortably  CHEST/LUNG: Clear to auscultation bilaterally; No rales, rhonchi, wheezing, or rubs.   HEART: Regular rate and rhythm; No murmurs, rubs, or gallops  ABDOMEN: Bowel sounds present; Soft, Nontender, Nondistended.   EXTREMITIES:  2+ Peripheral Pulses, brisk capillary refill. No clubbing, cyanosis, or edema  NERVOUS SYSTEM:  Alert & Oriented X3, speech clear. No deficits Vital Signs Last 24 Hrs  T(C): 37 (01 Jun 2020 14:36), Max: 37 (01 Jun 2020 14:36)  T(F): 98.6 (01 Jun 2020 14:36), Max: 98.6 (01 Jun 2020 14:36)  HR: 86 (01 Jun 2020 17:10) (86 - 91)  BP: 119/81 (01 Jun 2020 17:10) (112/74 - 119/81)  RR: 18 (01 Jun 2020 14:10) (18 - 18)  SpO2: 96% (01 Jun 2020 17:30) (96% - 98%)      GENERAL: NAD, lying in bed comfortably  CHEST/LUNG: Clear to auscultation bilaterally; No rales, rhonchi, wheezing, or rubs.   HEART: Regular rate and rhythm; No murmurs, rubs, or gallops  ABDOMEN: Bowel sounds present; Soft, Nontender, Nondistended.   EXTREMITIES:  2+ Peripheral Pulses, brisk capillary refill. No clubbing, cyanosis, or edema  NERVOUS SYSTEM:  Awake but confused, speech clear. No deficits

## 2020-06-01 NOTE — ED PROVIDER NOTE - NS ED ROS FT
Review of Systems:  	•	CONSTITUTIONAL - no fever, no diaphoresis, no chills  	•	SKIN - no rash  	•	HEMATOLOGIC - no bleeding, no bruising  	•	EYES - no eye pain, no blurry vision  	•	ENT - no change in hearing, no sore throat, no ear pain or tinnitus  	•	RESPIRATORY - no shortness of breath, no cough  	•	CARDIAC - no chest pain, no palpitations  	•	GI - no abd pain, no nausea, no vomiting, no diarrhea, no constipation  	•	GENITO-URINARY - no discharge, no dysuria; no hematuria, no increased urinary frequency  	•	MUSCULOSKELETAL - no joint paint, no swelling, no redness  	•	NEUROLOGIC - weakness, no headache, no paresthesias, no LOC  	•	PSYCH - no anxiety, non suicidal, non homicidal, no hallucination, no depression

## 2020-06-02 LAB
ANION GAP SERPL CALC-SCNC: 19 MMOL/L — HIGH (ref 7–14)
APPEARANCE UR: ABNORMAL
BILIRUB UR-MCNC: ABNORMAL
BUN SERPL-MCNC: 35 MG/DL — HIGH (ref 10–20)
CALCIUM SERPL-MCNC: 9.3 MG/DL — SIGNIFICANT CHANGE UP (ref 8.5–10.1)
CHLORIDE SERPL-SCNC: 106 MMOL/L — SIGNIFICANT CHANGE UP (ref 98–110)
CO2 SERPL-SCNC: 19 MMOL/L — SIGNIFICANT CHANGE UP (ref 17–32)
COD CRY URNS QL: ABNORMAL
COLOR SPEC: YELLOW — SIGNIFICANT CHANGE UP
COMMENT - URINE: SIGNIFICANT CHANGE UP
CREAT SERPL-MCNC: 1.1 MG/DL — SIGNIFICANT CHANGE UP (ref 0.7–1.5)
DIFF PNL FLD: ABNORMAL
GLUCOSE SERPL-MCNC: 91 MG/DL — SIGNIFICANT CHANGE UP (ref 70–99)
GLUCOSE UR QL: NEGATIVE MG/DL — SIGNIFICANT CHANGE UP
HCT VFR BLD CALC: 41.7 % — SIGNIFICANT CHANGE UP (ref 37–47)
HGB BLD-MCNC: 13.6 G/DL — SIGNIFICANT CHANGE UP (ref 12–16)
KETONES UR-MCNC: ABNORMAL
LEUKOCYTE ESTERASE UR-ACNC: NEGATIVE — SIGNIFICANT CHANGE UP
MAGNESIUM SERPL-MCNC: 1.7 MG/DL — LOW (ref 1.8–2.4)
MCHC RBC-ENTMCNC: 32.1 PG — HIGH (ref 27–31)
MCHC RBC-ENTMCNC: 32.6 G/DL — SIGNIFICANT CHANGE UP (ref 32–37)
MCV RBC AUTO: 98.3 FL — SIGNIFICANT CHANGE UP (ref 81–99)
NITRITE UR-MCNC: NEGATIVE — SIGNIFICANT CHANGE UP
NRBC # BLD: 0 /100 WBCS — SIGNIFICANT CHANGE UP (ref 0–0)
PH UR: 5.5 — SIGNIFICANT CHANGE UP (ref 5–8)
PLATELET # BLD AUTO: 211 K/UL — SIGNIFICANT CHANGE UP (ref 130–400)
POTASSIUM SERPL-MCNC: 4.5 MMOL/L — SIGNIFICANT CHANGE UP (ref 3.5–5)
POTASSIUM SERPL-SCNC: 4.5 MMOL/L — SIGNIFICANT CHANGE UP (ref 3.5–5)
PROT UR-MCNC: 30 MG/DL
RBC # BLD: 4.24 M/UL — SIGNIFICANT CHANGE UP (ref 4.2–5.4)
RBC # FLD: 14.6 % — HIGH (ref 11.5–14.5)
RBC CASTS # UR COMP ASSIST: ABNORMAL /HPF
SODIUM SERPL-SCNC: 144 MMOL/L — SIGNIFICANT CHANGE UP (ref 135–146)
SP GR SPEC: >=1.03 (ref 1.01–1.03)
TROPONIN T SERPL-MCNC: 0.04 NG/ML — CRITICAL HIGH
UROBILINOGEN FLD QL: 0.2 MG/DL — SIGNIFICANT CHANGE UP (ref 0.2–0.2)
WBC # BLD: 10.69 K/UL — SIGNIFICANT CHANGE UP (ref 4.8–10.8)
WBC # FLD AUTO: 10.69 K/UL — SIGNIFICANT CHANGE UP (ref 4.8–10.8)
WBC UR QL: ABNORMAL /HPF

## 2020-06-02 PROCEDURE — 99233 SBSQ HOSP IP/OBS HIGH 50: CPT

## 2020-06-02 PROCEDURE — 71045 X-RAY EXAM CHEST 1 VIEW: CPT | Mod: 26

## 2020-06-02 PROCEDURE — 99232 SBSQ HOSP IP/OBS MODERATE 35: CPT

## 2020-06-02 RX ORDER — SODIUM CHLORIDE 9 MG/ML
500 INJECTION INTRAMUSCULAR; INTRAVENOUS; SUBCUTANEOUS ONCE
Refills: 0 | Status: COMPLETED | OUTPATIENT
Start: 2020-06-02 | End: 2020-06-02

## 2020-06-02 RX ORDER — METOPROLOL TARTRATE 50 MG
5 TABLET ORAL ONCE
Refills: 0 | Status: COMPLETED | OUTPATIENT
Start: 2020-06-02 | End: 2020-06-02

## 2020-06-02 RX ORDER — MAGNESIUM SULFATE 500 MG/ML
2 VIAL (ML) INJECTION ONCE
Refills: 0 | Status: COMPLETED | OUTPATIENT
Start: 2020-06-02 | End: 2020-06-02

## 2020-06-02 RX ORDER — METOPROLOL TARTRATE 50 MG
50 TABLET ORAL
Refills: 0 | Status: DISCONTINUED | OUTPATIENT
Start: 2020-06-02 | End: 2020-06-09

## 2020-06-02 RX ORDER — DILTIAZEM HCL 120 MG
20 CAPSULE, EXT RELEASE 24 HR ORAL ONCE
Refills: 0 | Status: COMPLETED | OUTPATIENT
Start: 2020-06-02 | End: 2020-06-02

## 2020-06-02 RX ADMIN — CHLORHEXIDINE GLUCONATE 1 APPLICATION(S): 213 SOLUTION TOPICAL at 11:25

## 2020-06-02 RX ADMIN — Medication 20 MILLIGRAM(S): at 05:40

## 2020-06-02 RX ADMIN — Medication 5 MILLIGRAM(S): at 05:35

## 2020-06-02 RX ADMIN — PANTOPRAZOLE SODIUM 40 MILLIGRAM(S): 20 TABLET, DELAYED RELEASE ORAL at 06:01

## 2020-06-02 RX ADMIN — Medication 50 MILLIGRAM(S): at 17:22

## 2020-06-02 RX ADMIN — Medication 10 MILLIGRAM(S): at 06:01

## 2020-06-02 RX ADMIN — APIXABAN 5 MILLIGRAM(S): 2.5 TABLET, FILM COATED ORAL at 06:01

## 2020-06-02 RX ADMIN — Medication 50 GRAM(S): at 11:10

## 2020-06-02 RX ADMIN — SODIUM CHLORIDE 500 MILLILITER(S): 9 INJECTION INTRAMUSCULAR; INTRAVENOUS; SUBCUTANEOUS at 06:43

## 2020-06-02 RX ADMIN — APIXABAN 5 MILLIGRAM(S): 2.5 TABLET, FILM COATED ORAL at 17:22

## 2020-06-02 NOTE — DIETITIAN INITIAL EVALUATION ADULT. - ADD RECOMMEND
liberalize diet to Regular, increase Ensure Enlive to 3 bottles/day (1050kcal, 60gm protein) - pt prefers vanilla flavor. Encourage/assist at meal times. Obtain/document weight twice a week. Correct Mg. 3 day kcal count in progress (6/2-6/4), results to follow.

## 2020-06-02 NOTE — PROGRESS NOTE ADULT - SUBJECTIVE AND OBJECTIVE BOX
Patient is a 80y old  Female who presents with a chief complaint of     T(F): 97 (06-02-20 @ 05:18), Max: 98.6 (06-01-20 @ 14:36)  HR: 80 (06-02-20 @ 07:39)  BP: 111/63 (06-02-20 @ 07:39)  RR: 18 (06-02-20 @ 05:18)  SpO2: 100% (06-02-20 @ 07:39) (96% - 100%)    PHYSICAL EXAM:  GENERAL: NAD, well-groomed, well-developed  HEAD:  Atraumatic, Normocephalic  EYES: EOMI, PERRLA, conjunctiva and sclera clear  ENMT: No tonsillar erythema, exudates, or enlargement; Moist mucous membranes, Good dentition, No lesions  NECK: Supple, No JVD, Normal thyroid  NERVOUS SYSTEM:  Alert & Oriented X3, Good concentration; Motor Strength 5/5 B/L upper and lower extremities; DTRs 2+ intact and symmetric  CHEST/LUNG: Clear to percussion bilaterally; No rales, rhonchi, wheezing, or rubs  HEART: Regular rate and rhythm; No murmurs, rubs, or gallops  ABDOMEN: Soft, Nontender, Nondistended; Bowel sounds present  EXTREMITIES:  2+ Peripheral Pulses, No clubbing, cyanosis, or edema  LYMPH: No lymphadenopathy noted  SKIN: No rashes or lesions    LABS  06-02    144  |  106  |  35<H>  ----------------------------<  91  4.5   |  19  |  1.1    Ca    9.3      02 Jun 2020 06:46  Mg     1.7     06-02    TPro  5.6<L>  /  Alb  3.5  /  TBili  0.6  /  DBili  x   /  AST  24  /  ALT  16  /  AlkPhos  68  06-01                          13.6   10.69 )-----------( 211      ( 02 Jun 2020 06:46 )             41.7     PT/INR - ( 01 Jun 2020 16:39 )   PT: 16.10 sec;   INR: 1.40 ratio         PTT - ( 01 Jun 2020 16:39 )  PTT:27.4 sec    CARDIAC ENZYMES      Troponin T, Serum: 0.03 ng/mL (06-01-20 @ 22:10)  Troponin T, Serum: 0.03 ng/mL (06-01-20 @ 16:39)      Culture Results:   No Growth Final (05-16-20)  Culture Results:   No Growth Final (05-16-20)    RADIOLOGY  < from: CT Head No Cont (06.01.20 @ 15:54) >  Impression:    Unremarkable study.    < end of copied text >    MEDICATIONS  (STANDING):  apixaban 5 milliGRAM(s) Oral every 12 hours  chlorhexidine 4% Liquid 1 Application(s) Topical <User Schedule>  lisinopril 10 milliGRAM(s) Oral daily  magnesium sulfate  IVPB 2 Gram(s) IV Intermittent once  metoprolol tartrate 50 milliGRAM(s) Oral two times a day  pantoprazole    Tablet 40 milliGRAM(s) Oral before breakfast  predniSONE   Tablet 10 milliGRAM(s) Oral daily  sodium chloride 0.9%. 1000 milliLiter(s) (60 mL/Hr) IV Continuous <Continuous>    MEDICATIONS  (PRN):  acetaminophen   Tablet .. 650 milliGRAM(s) Oral every 6 hours PRN Temp greater or equal to 38C (100.4F), Mild Pain (1 - 3) Patient seen and evaluated, chart reviewed s/p rapid response this am pt found in SVT, pt now resting comfortably in bed in NSR @72    T(F): 97 (06-02-20 @ 05:18), Max: 98.6 (06-01-20 @ 14:36)  HR: 80 (06-02-20 @ 07:39)  BP: 111/63 (06-02-20 @ 07:39)  RR: 18 (06-02-20 @ 05:18)  SpO2: 100% (06-02-20 @ 07:39) (96% - 100%)    PHYSICAL EXAM:  GENERAL: NAD  HEAD:  Atraumatic, Normocephalic  NERVOUS SYSTEM:  no focal deficits   CHEST/LUNG:  bilateral rhonchi  HEART: Regular rate and rhythm; No murmurs, rubs, or gallops  ABDOMEN: Soft, Nontender, Nondistended; Bowel sounds present  EXTREMITIES:  2+ Peripheral Pulses, No clubbing, cyanosis, or edema      LABS  06-02    144  |  106  |  35<H>  ----------------------------<  91  4.5   |  19  |  1.1    Ca    9.3      02 Jun 2020 06:46  Mg     1.7     06-02    TPro  5.6<L>  /  Alb  3.5  /  TBili  0.6  /  DBili  x   /  AST  24  /  ALT  16  /  AlkPhos  68  06-01                          13.6   10.69 )-----------( 211      ( 02 Jun 2020 06:46 )             41.7     PT/INR - ( 01 Jun 2020 16:39 )   PT: 16.10 sec;   INR: 1.40 ratio         PTT - ( 01 Jun 2020 16:39 )  PTT:27.4 sec    CARDIAC ENZYMES      Troponin T, Serum: 0.03 ng/mL (06-01-20 @ 22:10)  Troponin T, Serum: 0.03 ng/mL (06-01-20 @ 16:39)      Culture Results:   No Growth Final (05-16-20)  Culture Results:   No Growth Final (05-16-20)    RADIOLOGY    CXR - Left sided opacity/effusion    < from: CT Head No Cont (06.01.20 @ 15:54) >  Impression:    Unremarkable study.    < end of copied text >    MEDICATIONS  (STANDING):  apixaban 5 milliGRAM(s) Oral every 12 hours  chlorhexidine 4% Liquid 1 Application(s) Topical <User Schedule>  lisinopril 10 milliGRAM(s) Oral daily  magnesium sulfate  IVPB 2 Gram(s) IV Intermittent once  metoprolol tartrate 50 milliGRAM(s) Oral two times a day  pantoprazole    Tablet 40 milliGRAM(s) Oral before breakfast  predniSONE   Tablet 10 milliGRAM(s) Oral daily  sodium chloride 0.9%. 1000 milliLiter(s) (60 mL/Hr) IV Continuous <Continuous>    MEDICATIONS  (PRN):  acetaminophen   Tablet .. 650 milliGRAM(s) Oral every 6 hours PRN Temp greater or equal to 38C (100.4F), Mild Pain (1 - 3)

## 2020-06-02 NOTE — CHART NOTE - NSCHARTNOTEFT_GEN_A_CORE
RRT called for tachycardia HR ~180's. Pt w known hx of Lymphoma and recent PE w IVC filter placement.  Pt is on Eliquis.  Lopressor 5mg IVP given with minimal response.  Diltiazem 20mg IVP given , HR to the 80's.  EKG showed a-fib, post rate control.  Continue eliquis.  Change metoprolol 50mg XL, to 50 mg bid, monitor BP.  F/u bmp and cardiac enzymes.  Cardio eval.

## 2020-06-02 NOTE — DIETITIAN INITIAL EVALUATION ADULT. - ENERGY NEEDS
Estimated energy needs: 1600 - 2000kcal/day based on 1400-1600kcal/d (MSJ x 1.3-1.5) compared to 1860-2170kcal/day(30-35kcal/kg act wt)  Estimated protein needs: 74-87gm/day (1.2-1.4gm/kg act wt)   Estimated fluid needs: 1ml/1kcal or per LIP

## 2020-06-02 NOTE — PHYSICAL THERAPY INITIAL EVALUATION ADULT - SPECIFY REASON(S)
As discussed with ANITA Silva, please hold PT at this time secondary to patient being rapid response this morning. Will follow up as appropriate.

## 2020-06-02 NOTE — DIETITIAN INITIAL EVALUATION ADULT. - NS FNS WEIGHT CHANGE REASON
suspected uintentional weight loss (if weights are accurate); as per EMR pt was 64.7kg on May 20th 2020 (last [facility] admission)/unintentional

## 2020-06-02 NOTE — DIETITIAN INITIAL EVALUATION ADULT. - PHYSICAL APPEARANCE
confused and forgetful. BMI- 24.2 confused and forgetful. BMI- 24.2 (160cm/62kg), IBW 52.3kg +/-10%. SKin - ecchymosis. No documented BMs. Nutrition focused physical exam deferred/not appropriate at this time.

## 2020-06-02 NOTE — DIETITIAN INITIAL EVALUATION ADULT. - ENERGY INTAKE
based on kcal count worksheets pt consumed less than 50% trays today yet takes Ensure Enlive based on RD observations. Poor (<50%)

## 2020-06-02 NOTE — CHART NOTE - NSCHARTNOTEFT_GEN_A_CORE
Rapid response called on patient due to elevated HR of 180's. Pt was seen at bedside with ICU attending, /93 O2 Sat 95%. 5mg metoprolol given, pt was still tachycardic, pt was given 20mg cardizem, HR reverted to sinus rhythm @ 81BPM. Metoprolol 50mg XL changed to 50mg BID for better coverage. Per ICU attending pt is stable to keep in 3S for now, no need for ICU care at this time. BP 86/55 HR 80. 500ml NS ordered. Will continue to monitor patient, f/u AM labs including troponin. Cardio consult pending. Rapid response called on patient due to elevated HR of 180's. Pt was seen at bedside with ICU attending, /93 O2 Sat 95%. 5mg metoprolol given, pt was still tachycardic, pt was given 20mg cardizem, rhythm appears SVT, HR reverted to sinus rhythm @ 81 BPM. Metoprolol 50mg XL changed to 50mg BID for better coverage. Per ICU attending pt is stable to keep in 3S for now, no need for ICU care at this time. BP 86/55 HR 80. 500ml NS ordered. Will continue to monitor patient, f/u AM labs including troponin. Cardio consult pending.

## 2020-06-02 NOTE — CONSULT NOTE ADULT - ASSESSMENT
Impression   pleural effusion ?? failure    history of lymphoma with mets   arrythmia   history PE     plan :  keep negative balance consider lasix 40 mg once   cxr today and heladio   if any fever start merop and vanco   if effusion persist will do bed side US to evaluate if need tap   on eliquis now   keep pox > 92 %   rate control follow cardiology   case discussed with [facility]

## 2020-06-02 NOTE — CONSULT NOTE ADULT - ASSESSMENT
Patient on o2. Not a good historian . Now no complaints. She denies lymphoma. She had svt. Agree beta for now. May need amiodarone. Troponin lower than prior. Repeat enzymes . repeat echo. Continue ac. Prognosis poor

## 2020-06-02 NOTE — PROGRESS NOTE ADULT - ASSESSMENT
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 Family called EMS due to increase confusion over the past 3 days. At night time her mental status would be  worse. Not ambulating at all, mainly in bed all day.     Last chemo was 4 weeks ago , prior to the last admission.   No longer taking methotrexate for her RA    ON LAST ADMISSION 3 WEEKS AGO :     CTA chest revealed acute bilateral pulmonary emboli within the distal portion of the right main pulmonary artery and proximal left lower lobe arteries. Echocardiogram confirmed severe right heart strain. Patient was admitted to the ICU at [facility]. Patient was evaluated by IR, who deemed that the patient was not a candidate for embolectomy. An IVC filter was placed on 5/18/2020 and the patient was fully anticoagulated. Outpatient MRI head 1 week prior to admission showed no evidence of brain metastases. 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 At night time her mental status would be  worse. Not ambulating at all, mainly in bed all day.     Last chemo was 4 weeks ago , prior to the last admission.   No longer taking methotrexate for her RA    ON LAST ADMISSION 3 WEEKS AGO :     CTA chest revealed acute bilateral pulmonary emboli within the distal portion of the right main pulmonary artery and proximal left lower lobe arteries. Echocardiogram confirmed severe right heart strain. Patient was admitted to the ICU at [facility]. Patient was evaluated by IR, who deemed that the patient was not a candidate for embolectomy. An IVC filter was placed on 5/18/2020 and the patient was fully anticoagulated. Outpatient MRI head 1 week prior to admission showed no evidence of brain metastases.     This am pt had rapid response and found with svt which resolved after IV Cardizem and lopressor       Lymphoma /DVT & PE s/p IVC filter / SVT / HTN / Hypomagnesemia  / RA / weakness and difficulty ambulating      - supplement magnesium   - continue metoprolol 50mg q12h   - tele   - Left sided opacity on cxr, no fevers, no elevation in wbc - recent hospitalization   - no antibiotics for now, If spikes fever would start   - consult pulmonary    - check urine cultures and repeat cxr   - pt/rehab   - continue Apixaban, prednisone, Lisinopril

## 2020-06-02 NOTE — CONSULT NOTE ADULT - SUBJECTIVE AND OBJECTIVE BOX
Patient is a 80y old  Female who presents with a chief complaint of     HPI:  FULL CODE    UNABLE TO GET ANY HISTORY FROM PT.  CALLED  OVER THE PHONE.     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    Family called EMS due to increase confusion over the past 3 days. At night time her mental status would be the worse.   Not ambulating at all, mainly in bed all day.     Last chemo was 4 weeks ago , prior to the last admission.   No longer taking methotrexate for her RA  over night went into svt given cardizem and then fluid   called for cxr finding   ON LAST ADMISSION 3 WEEKS AGO :     CTA chest revealed acute bilateral pulmonary emboli within the distal portion of the right main pulmonary artery and proximal left lower lobe arteries. Echocardiogram confirmed severe right heart strain with RV/LV ration 3.2. Patient was admitted to the ICU at [facility] [place]. Patient was evaluated by IR, who deemed that the patient was not a candidate for embolectomy. An IVC filter was placed on 5/18/2020 and the patient was fully anticoagulated. Outpatient MRI head 1 week prior to admission showed no evidence of brain metastases. Patient will be discharged on Eliquis. Patient now saturating 97% on room air while ambulating. (01 Jun 2020 18:05)      PAST MEDICAL & SURGICAL HISTORY:  HTN (hypertension)  Rheumatoid arthritis  Lymphoma: mets to lungs, liver and bone  H/O total knee replacement  History of hip replacement      FAMILY HISTORY:    Family history: No family cardiovascular system   Occupation:  Alochol: Denied  Smoking: Denied  Drug Use: Denied  Marital Status:           Allergies    No Known Allergies    Intolerances        Home Medications:  lisinopril 20 mg oral tablet: 1 tab(s) orally once a day (19 May 2020 10:29)  metoprolol succinate 50 mg oral tablet, extended release: 1 tab(s) orally once a day (19 May 2020 10:29)  predniSONE 10 mg oral tablet: 1 tab(s) orally once a day (19 May 2020 10:29)      ROS: as in HPI; All other systems reviewed are negative        PHYSICAL EXAM:  Vital Signs Last 24 Hrs  T(C): 36.1 (02 Jun 2020 05:18), Max: 37 (01 Jun 2020 14:36)  T(F): 97 (02 Jun 2020 05:18), Max: 98.6 (01 Jun 2020 14:36)  HR: 80 (02 Jun 2020 07:39) (80 - 103)  BP: 111/63 (02 Jun 2020 07:39) (111/63 - 142/78)  BP(mean): --  RR: 18 (02 Jun 2020 05:18) (18 - 20)  SpO2: 100% (02 Jun 2020 07:39) (96% - 100%)      GENERAL: awake follow command   HEAD:  Atraumatic, Normocephalic  EYES: EOMI, PERRLA, conjunctiva and sclera clear  ENMT: No tonsillar erythema, exudates, or enlargement; Moist mucous membranes, Good dentition, No lesions  NECK: Supple, No JVD, Normal thyroid  NERVOUS SYSTEM:  Alert & Oriented X3, Good concentration; Motor Strength 5/5 B/L upper and lower extremities; DTRs 2+ intact and symmetric  CHEST/LUNG: Clear to percussion bilaterally; No rales, rhonchi, wheezing, or rubs  HEART: Regular rate and rhythm; No murmurs, rubs, or gallops  ABDOMEN: Soft, Nontender, Nondistended; Bowel sounds present  EXTREMITIES:  2+ Peripheral Pulses, No clubbing, cyanosis, or edema  LYMPH: No lymphadenopathy noted  SKIN: No rashes or lesions    HOSPITAL MEDICATIONS:  MEDICATIONS  (STANDING):  apixaban 5 milliGRAM(s) Oral every 12 hours  chlorhexidine 4% Liquid 1 Application(s) Topical <User Schedule>  lisinopril 10 milliGRAM(s) Oral daily  magnesium sulfate  IVPB 2 Gram(s) IV Intermittent once  metoprolol tartrate 50 milliGRAM(s) Oral two times a day  pantoprazole    Tablet 40 milliGRAM(s) Oral before breakfast  predniSONE   Tablet 10 milliGRAM(s) Oral daily  sodium chloride 0.9%. 1000 milliLiter(s) (60 mL/Hr) IV Continuous <Continuous>    MEDICATIONS  (PRN):  acetaminophen   Tablet .. 650 milliGRAM(s) Oral every 6 hours PRN Temp greater or equal to 38C (100.4F), Mild Pain (1 - 3)      LABS:                        13.6   10.69 )-----------( 211      ( 02 Jun 2020 06:46 )             41.7     06-02    144  |  106  |  35<H>  ----------------------------<  91  4.5   |  19  |  1.1    Ca    9.3      02 Jun 2020 06:46  Mg     1.7     06-02    TPro  5.6<L>  /  Alb  3.5  /  TBili  0.6  /  DBili  x   /  AST  24  /  ALT  16  /  AlkPhos  68  06-01    PT/INR - ( 01 Jun 2020 16:39 )   PT: 16.10 sec;   INR: 1.40 ratio         PTT - ( 01 Jun 2020 16:39 )  PTT:27.4 sec  Urinalysis Basic - ( 02 Jun 2020 05:50 )    Color: Yellow / Appearance: Cloudy / SG: >=1.030 / pH: x  Gluc: x / Ketone: Trace  / Bili: Moderate / Urobili: 0.2 mg/dL   Blood: x / Protein: 30 mg/dL / Nitrite: Negative   Leuk Esterase: Negative / RBC: 11-25 /HPF / WBC 6-10 /HPF   Sq Epi: x / Non Sq Epi: x / Bacteria: x                RADIOLOGY:  [ ] Reviewed and interpreted by me  when compare to 5/12 mild increase in effusion yannick left   ECHO:    Point of Care Ultrasound Findings;    PFT:

## 2020-06-02 NOTE — CONSULT NOTE ADULT - SUBJECTIVE AND OBJECTIVE BOX
CARDIOLOGY CONSULT NOTE     CHIEF COMPLAINT/REASON FOR CONSULT:    HPI:  FULL CODE    UNABLE TO GET ANY HISTORY FROM PT.  CALLED  OVER THE PHONE.     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    Family called EMS due to increase confusion over the past 3 days. At night time her mental status would be the worse.   Not ambulating at all, mainly in bed all day.     Last chemo was 4 weeks ago , prior to the last admission.   No longer taking methotrexate for her RA    ON LAST ADMISSION 3 WEEKS AGO :     CTA chest revealed acute bilateral pulmonary emboli within the distal portion of the right main pulmonary artery and proximal left lower lobe arteries. Echocardiogram confirmed severe right heart strain with RV/LV ration 3.2. Patient was admitted to the ICU at [facility]. Patient was evaluated by IR, who deemed that the patient was not a candidate for embolectomy. An IVC filter was placed on 5/18/2020 and the patient was fully anticoagulated. Outpatient MRI head 1 week prior to admission showed no evidence of brain metastases. Patient will be discharged on Eliquis. Patient now saturating 97% on room air while ambulating. (01 Jun 2020 18:05)      PAST MEDICAL & SURGICAL HISTORY:  HTN (hypertension)  Rheumatoid arthritis  Lymphoma: mets to lungs, liver and bone  H/O total knee replacement  History of hip replacement      Cardiac Risks:   [x ]HTN, [ ] DM, [ ] Smoking, [ ] FH,  [ ] Lipids        MEDICATIONS:  MEDICATIONS  (STANDING):  apixaban 5 milliGRAM(s) Oral every 12 hours  chlorhexidine 4% Liquid 1 Application(s) Topical <User Schedule>  lisinopril 10 milliGRAM(s) Oral daily  metoprolol tartrate 50 milliGRAM(s) Oral two times a day  pantoprazole    Tablet 40 milliGRAM(s) Oral before breakfast  predniSONE   Tablet 10 milliGRAM(s) Oral daily  sodium chloride 0.9%. 1000 milliLiter(s) (60 mL/Hr) IV Continuous <Continuous>      FAMILY HISTORY:      SOCIAL HISTORY:      [ ] Marital status    Allergies    No Known Allergies        	    REVIEW OF SYSTEMS:  CONSTITUTIONAL: No fever, weight loss, or fatigue  EYES: No eye pain, visual disturbances, or discharge  ENMT:  No difficulty hearing, tinnitus, vertigo; No sinus or throat pain Decreased hearing  NECK: No pain or stiffness  RESPIRATORY: No cough, wheezing, chills or hemoptysis; No Shortness of Breath  CARDIOVASCULAR: No chest pain, palpitations, passing out, dizziness, or leg swelling  GASTROINTESTINAL: No abdominal or epigastric pain. No nausea, vomiting, or hematemesis; No diarrhea or constipation. No melena or hematochezia.  GENITOURINARY: No dysuria, frequency, hematuria, or incontinence  NEUROLOGICAL: No headaches, memory loss, loss of strength, numbness, or tremors  SKIN: No itching, burning, rashes, or lesions   	    PHYSICAL EXAM:  T(C): 36.1 (06-02-20 @ 05:18), Max: 37 (06-01-20 @ 14:36)  HR: 86 (06-02-20 @ 05:18) (82 - 103)  BP: 114/61 (06-02-20 @ 05:18) (112/74 - 142/78)  RR: 18 (06-02-20 @ 05:18) (18 - 20)  SpO2: 97% (06-01-20 @ 18:10) (96% - 98%)  Wt(kg): --  I&O's Summary    01 Jun 2020 07:01  -  02 Jun 2020 07:00  --------------------------------------------------------  IN: 0 mL / OUT: 200 mL / NET: -200 mL        Appearance: Normal	  Psychiatry: A & O x 3, Mood & affect appropriate  HEENT:   Normal oral mucosa, PERRL, EOMI	  Lymphatic: No lymphadenopathy  Cardiovascular: Normal S1 S2,RRR, No JVD, No murmurs  Respiratory: Lungs clear to auscultation	  Gastrointestinal:  Soft, Non-tender, + BS	  Skin: No rashes, No ecchymoses, No cyanosis	  Neurologic: Non-focal  Extremities: Normal range of motion, No clubbing, cyanosis or edema  Vascular: Peripheral pulses palpable 2+ bilaterally      ECG:  	not available    	  LABS:	 	    CARDIAC MARKERS:                                    13.6   10.69 )-----------( 211      ( 02 Jun 2020 06:46 )             41.7     06-01    144  |  107  |  34<H>  ----------------------------<  92  4.3   |  19  |  1.2    Ca    9.6      01 Jun 2020 16:39    TPro  5.6<L>  /  Alb  3.5  /  TBili  0.6  /  DBili  x   /  AST  24  /  ALT  16  /  AlkPhos  68  06-01    PT/INR - ( 01 Jun 2020 16:39 )   PT: 16.10 sec;   INR: 1.40 ratio         PTT - ( 01 Jun 2020 16:39 )  PTT:27.4 sec    TSH: Thyroid Stimulating Hormone, Serum: 0.97 uIU/mL (06-01 @ 16:39)

## 2020-06-02 NOTE — DIETITIAN INITIAL EVALUATION ADULT. - OTHER INFO
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 S/p rapid response this AM.

## 2020-06-03 LAB
ANION GAP SERPL CALC-SCNC: 17 MMOL/L — HIGH (ref 7–14)
APTT BLD: 29 SEC — SIGNIFICANT CHANGE UP (ref 27–39.2)
APTT BLD: SIGNIFICANT CHANGE UP SEC (ref 27–39.2)
BUN SERPL-MCNC: 41 MG/DL — HIGH (ref 10–20)
CALCIUM SERPL-MCNC: 9.7 MG/DL — SIGNIFICANT CHANGE UP (ref 8.5–10.1)
CHLORIDE SERPL-SCNC: 105 MMOL/L — SIGNIFICANT CHANGE UP (ref 98–110)
CK MB CFR SERPL CALC: 6.1 NG/ML — SIGNIFICANT CHANGE UP (ref 0.6–6.3)
CK MB CFR SERPL CALC: 6.6 NG/ML — HIGH (ref 0.6–6.3)
CK MB CFR SERPL CALC: 7 NG/ML — HIGH (ref 0.6–6.3)
CK SERPL-CCNC: 49 U/L — SIGNIFICANT CHANGE UP (ref 0–225)
CK SERPL-CCNC: 55 U/L — SIGNIFICANT CHANGE UP (ref 0–225)
CK SERPL-CCNC: 61 U/L — SIGNIFICANT CHANGE UP (ref 0–225)
CO2 SERPL-SCNC: 20 MMOL/L — SIGNIFICANT CHANGE UP (ref 17–32)
CREAT SERPL-MCNC: 1.5 MG/DL — SIGNIFICANT CHANGE UP (ref 0.7–1.5)
CULTURE RESULTS: SIGNIFICANT CHANGE UP
GLUCOSE SERPL-MCNC: 103 MG/DL — HIGH (ref 70–99)
HCT VFR BLD CALC: 41.7 % — SIGNIFICANT CHANGE UP (ref 37–47)
HGB BLD-MCNC: 13.5 G/DL — SIGNIFICANT CHANGE UP (ref 12–16)
MAGNESIUM SERPL-MCNC: 2.2 MG/DL — SIGNIFICANT CHANGE UP (ref 1.8–2.4)
MCHC RBC-ENTMCNC: 31.7 PG — HIGH (ref 27–31)
MCHC RBC-ENTMCNC: 32.4 G/DL — SIGNIFICANT CHANGE UP (ref 32–37)
MCV RBC AUTO: 97.9 FL — SIGNIFICANT CHANGE UP (ref 81–99)
NRBC # BLD: 0 /100 WBCS — SIGNIFICANT CHANGE UP (ref 0–0)
PLATELET # BLD AUTO: 215 K/UL — SIGNIFICANT CHANGE UP (ref 130–400)
POTASSIUM SERPL-MCNC: 4.4 MMOL/L — SIGNIFICANT CHANGE UP (ref 3.5–5)
POTASSIUM SERPL-SCNC: 4.4 MMOL/L — SIGNIFICANT CHANGE UP (ref 3.5–5)
RBC # BLD: 4.26 M/UL — SIGNIFICANT CHANGE UP (ref 4.2–5.4)
RBC # FLD: 15 % — HIGH (ref 11.5–14.5)
SODIUM SERPL-SCNC: 142 MMOL/L — SIGNIFICANT CHANGE UP (ref 135–146)
SPECIMEN SOURCE: SIGNIFICANT CHANGE UP
TROPONIN T SERPL-MCNC: 0.03 NG/ML — CRITICAL HIGH
TROPONIN T SERPL-MCNC: 0.04 NG/ML — CRITICAL HIGH
TROPONIN T SERPL-MCNC: 0.05 NG/ML — CRITICAL HIGH
WBC # BLD: 12.07 K/UL — HIGH (ref 4.8–10.8)
WBC # FLD AUTO: 12.07 K/UL — HIGH (ref 4.8–10.8)

## 2020-06-03 PROCEDURE — 71250 CT THORAX DX C-: CPT | Mod: 26

## 2020-06-03 PROCEDURE — 99233 SBSQ HOSP IP/OBS HIGH 50: CPT

## 2020-06-03 PROCEDURE — 71045 X-RAY EXAM CHEST 1 VIEW: CPT | Mod: 26

## 2020-06-03 RX ORDER — MORPHINE SULFATE 50 MG/1
2 CAPSULE, EXTENDED RELEASE ORAL ONCE
Refills: 0 | Status: DISCONTINUED | OUTPATIENT
Start: 2020-06-03 | End: 2020-06-03

## 2020-06-03 RX ORDER — MAGNESIUM SULFATE 500 MG/ML
1 VIAL (ML) INJECTION ONCE
Refills: 0 | Status: COMPLETED | OUTPATIENT
Start: 2020-06-03 | End: 2020-06-03

## 2020-06-03 RX ORDER — AMIODARONE HYDROCHLORIDE 400 MG/1
0.5 TABLET ORAL
Qty: 900 | Refills: 0 | Status: DISCONTINUED | OUTPATIENT
Start: 2020-06-03 | End: 2020-06-03

## 2020-06-03 RX ORDER — DILTIAZEM HCL 120 MG
10 CAPSULE, EXT RELEASE 24 HR ORAL ONCE
Refills: 0 | Status: COMPLETED | OUTPATIENT
Start: 2020-06-03 | End: 2020-06-03

## 2020-06-03 RX ORDER — AMIODARONE HYDROCHLORIDE 400 MG/1
1 TABLET ORAL
Qty: 900 | Refills: 0 | Status: DISCONTINUED | OUTPATIENT
Start: 2020-06-03 | End: 2020-06-03

## 2020-06-03 RX ORDER — AMIODARONE HYDROCHLORIDE 400 MG/1
150 TABLET ORAL ONCE
Refills: 0 | Status: DISCONTINUED | OUTPATIENT
Start: 2020-06-03 | End: 2020-06-03

## 2020-06-03 RX ORDER — HEPARIN SODIUM 5000 [USP'U]/ML
1000 INJECTION INTRAVENOUS; SUBCUTANEOUS
Qty: 25000 | Refills: 0 | Status: DISCONTINUED | OUTPATIENT
Start: 2020-06-03 | End: 2020-06-04

## 2020-06-03 RX ORDER — DILTIAZEM HCL 120 MG
15 CAPSULE, EXT RELEASE 24 HR ORAL ONCE
Refills: 0 | Status: COMPLETED | OUTPATIENT
Start: 2020-06-03 | End: 2020-06-03

## 2020-06-03 RX ORDER — FUROSEMIDE 40 MG
20 TABLET ORAL ONCE
Refills: 0 | Status: COMPLETED | OUTPATIENT
Start: 2020-06-03 | End: 2020-06-03

## 2020-06-03 RX ADMIN — Medication 20 MILLIGRAM(S): at 13:48

## 2020-06-03 RX ADMIN — PANTOPRAZOLE SODIUM 40 MILLIGRAM(S): 20 TABLET, DELAYED RELEASE ORAL at 05:49

## 2020-06-03 RX ADMIN — APIXABAN 5 MILLIGRAM(S): 2.5 TABLET, FILM COATED ORAL at 05:48

## 2020-06-03 RX ADMIN — LISINOPRIL 10 MILLIGRAM(S): 2.5 TABLET ORAL at 05:48

## 2020-06-03 RX ADMIN — MORPHINE SULFATE 2 MILLIGRAM(S): 50 CAPSULE, EXTENDED RELEASE ORAL at 13:49

## 2020-06-03 RX ADMIN — Medication 50 MILLIGRAM(S): at 05:47

## 2020-06-03 RX ADMIN — Medication 15 MILLIGRAM(S): at 03:57

## 2020-06-03 RX ADMIN — Medication 10 MILLIGRAM(S): at 13:29

## 2020-06-03 RX ADMIN — Medication 100 GRAM(S): at 13:41

## 2020-06-03 RX ADMIN — HEPARIN SODIUM 1000 UNIT(S)/HR: 5000 INJECTION INTRAVENOUS; SUBCUTANEOUS at 16:01

## 2020-06-03 RX ADMIN — CHLORHEXIDINE GLUCONATE 1 APPLICATION(S): 213 SOLUTION TOPICAL at 05:48

## 2020-06-03 RX ADMIN — Medication 650 MILLIGRAM(S): at 03:51

## 2020-06-03 RX ADMIN — Medication 10 MILLIGRAM(S): at 05:48

## 2020-06-03 NOTE — PROGRESS NOTE ADULT - ASSESSMENT
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 At night time her mental status would be  worse. Not ambulating at all, mainly in bed all day.     Last chemo was 4 weeks ago , prior to the last admission.   No longer taking methotrexate for her RA    ON LAST ADMISSION 3 WEEKS AGO :     CTA chest revealed acute bilateral pulmonary emboli within the distal portion of the right main pulmonary artery and proximal left lower lobe arteries. Echocardiogram confirmed severe right heart strain. Patient was admitted to the ICU at [facility]. Patient was evaluated by IR, who deemed that the patient was not a candidate for embolectomy. An IVC filter was placed on 5/18/2020 and the patient was fully anticoagulated. Outpatient MRI head 1 week prior to admission showed no evidence of brain metastases.     Again last night pt was  found with svt which resolved after IV Cardizem given        Lymphoma /DVT & PE s/p IVC filter / SVT / HTN / Hypomagnesemia  / RA / weakness and difficulty ambulating      - follow  magnesium levels    - continue metoprolol 50mg q12h   - tele / cardio following    - Left sided opacity on cxr, no fevers, no elevation in wbc - recent hospitalization   - no antibiotics for now, If spikes fever would start   - ct chest as per  pulmonary, change Eliquis to IV heparin for possible procedure    - check urine cultures    - pt/rehab   - continue  prednisone and  Lisinopril 80 year old Female RA on prednisone and 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; Family called EMS due to increase confusion over the past 3 days. At night time her mental status would be  worse. Not ambulating at all, mainly in bed all day.     Last chemo was 4 weeks ago , prior to the last admission.   No longer taking methotrexate for her RA    ON LAST ADMISSION 3 WEEKS AGO :     CTA chest revealed acute bilateral pulmonary emboli within the distal portion of the right main pulmonary artery and proximal left lower lobe arteries. Echocardiogram confirmed severe right heart strain. Patient was admitted to the ICU at [facility]. Patient was evaluated by IR, who deemed that the patient was not a candidate for embolectomy. An IVC filter was placed on 5/18/2020 and the patient was fully anticoagulated. Outpatient MRI head 1 week prior to admission showed no evidence of brain metastases.     Again last night pt was  found with svt which resolved after IV Cardizem given        Lymphoma /DVT & PE s/p IVC filter / SVT / HTN / Hypomagnesemia  / RA / weakness and difficulty ambulating      - supplement and follow  magnesium levels    - continue metoprolol 50mg q12h   - tele / cardio following    - Left sided opacity on cxr, no fevers, no elevation in wbc - recent hospitalization   - no antibiotics for now, If spikes fever would start   - ct chest as per  pulmonary, change Eliquis to IV heparin for possible procedure    - check urine cultures    - pt/rehab   - continue  prednisone and  Lisinopril

## 2020-06-03 NOTE — PROGRESS NOTE ADULT - SUBJECTIVE AND OBJECTIVE BOX
Patient is a 80y old  Female who presents with a chief complaint of weakness (02 Jun 2020 09:04)      Over Night Events:  Patient seen and examined.   again had episodes of     ROS:  See HPI    PHYSICAL EXAM    ICU Vital Signs Last 24 Hrs  T(C): 35.8 (03 Jun 2020 05:27), Max: 36.1 (02 Jun 2020 21:06)  T(F): 96.5 (03 Jun 2020 05:27), Max: 96.9 (02 Jun 2020 21:06)  HR: 84 (03 Jun 2020 05:27) (84 - 87)  BP: 125/70 (03 Jun 2020 05:49) (121/72 - 134/87)  BP(mean): --  ABP: --  ABP(mean): --  RR: 18 (03 Jun 2020 05:49) (18 - 18)  SpO2: 96% (03 Jun 2020 05:49) (96% - 96%)      General:  HEENT:                Lymph Nodes: NO cervical LN   Lungs: Bilateral BS  Cardiovascular: Regular   Abdomen: Soft, Positive BS  Extremities: No clubbing   Skin: warm   Neurological:   Musculoskeletal: move all ext     I&O's Detail    02 Jun 2020 07:01  -  03 Jun 2020 07:00  --------------------------------------------------------  IN:  Total IN: 0 mL    OUT:    Voided: 200 mL  Total OUT: 200 mL    Total NET: -200 mL          LABS:                          13.5   12.07 )-----------( 215      ( 03 Jun 2020 06:20 )             41.7         03 Jun 2020 06:20    142    |  105    |  41     ----------------------------<  103    4.4     |  20     |  1.5      Ca    9.7        03 Jun 2020 06:20  Mg     1.7       02 Jun 2020 06:46                                               PT/INR - ( 01 Jun 2020 16:39 )   PT: 16.10 sec;   INR: 1.40 ratio         PTT - ( 01 Jun 2020 16:39 )  PTT:27.4 sec                                       Urinalysis Basic - ( 02 Jun 2020 05:50 )    Color: Yellow / Appearance: Cloudy / SG: >=1.030 / pH: x  Gluc: x / Ketone: Trace  / Bili: Moderate / Urobili: 0.2 mg/dL   Blood: x / Protein: 30 mg/dL / Nitrite: Negative   Leuk Esterase: Negative / RBC: 11-25 /HPF / WBC 6-10 /HPF   Sq Epi: x / Non Sq Epi: x / Bacteria: x          CARDIAC MARKERS ( 02 Jun 2020 06:46 )  x     / 0.04 ng/mL / x     / x     / x      CARDIAC MARKERS ( 01 Jun 2020 22:10 )  x     / 0.03 ng/mL / x     / x     / x      CARDIAC MARKERS ( 01 Jun 2020 16:39 )  x     / 0.03 ng/mL / x     / x     / x                                                                                                                                                 MEDICATIONS  (STANDING):  apixaban 5 milliGRAM(s) Oral every 12 hours  chlorhexidine 4% Liquid 1 Application(s) Topical <User Schedule>  lisinopril 10 milliGRAM(s) Oral daily  metoprolol tartrate 50 milliGRAM(s) Oral two times a day  pantoprazole    Tablet 40 milliGRAM(s) Oral before breakfast  predniSONE   Tablet 10 milliGRAM(s) Oral daily  sodium chloride 0.9%. 1000 milliLiter(s) (60 mL/Hr) IV Continuous <Continuous>    MEDICATIONS  (PRN):  acetaminophen   Tablet .. 650 milliGRAM(s) Oral every 6 hours PRN Temp greater or equal to 38C (100.4F), Mild Pain (1 - 3)          Xrays:  TLC:  OG:  ET tube:                                                                                    worsening b/l effusion L>R    ECHO:  [facility] ICU:

## 2020-06-03 NOTE — CHART NOTE - NSCHARTNOTEFT_GEN_A_CORE
Called in by RN, Pt is in SVT - HR was in 170s on tele   As per Dr. Ward's recommendations,   Checked BP - 124/84   Since BP was stable 10mg of IV Cardizem given STAT   Which broke the SVT to 90s   Discussed with Dr. Ward

## 2020-06-03 NOTE — PROGRESS NOTE ADULT - ASSESSMENT
Impression   pleural effusion ?? failure    history of lymphoma with mets   arrythmia   history PE     plan :  hold eliquis start heparin IV for possible thoracentesis depend ct result   do ct chest no contrast to evaluate the left side   keep negative balance consider lasix 40 mg once   d/c fluid   if any fever start merop and vanco   if effusion persist will do bed side US to evaluate if need tap   on eliquis now   keep pox > 92 %   rate control follow cardiology   case discussed with [facility]

## 2020-06-03 NOTE — PROGRESS NOTE ADULT - SUBJECTIVE AND OBJECTIVE BOX
Patient is comfortable in bed, no complaints       T(F): 96.5 (06-03-20 @ 05:27), Max: 96.9 (06-02-20 @ 21:06)  HR: 84 (06-03-20 @ 05:27)  BP: 125/70 (06-03-20 @ 05:49)  RR: 18 (06-03-20 @ 05:49)  SpO2: 96% (06-03-20 @ 05:49) (96% - 96%)    PHYSICAL EXAM:  GENERAL: NAD  HEAD:  Atraumatic, Normocephalic  NERVOUS SYSTEM:  no focal deficits   CHEST/LUNG:  bilateral rales  HEART: Regular rate and rhythm; No murmurs, rubs, or gallops  ABDOMEN: Soft, Nontender, Nondistended; Bowel sounds present  EXTREMITIES:  2+ Peripheral Pulses, No clubbing, cyanosis, or edema  LYMPH: No lymphadenopathy noted  SKIN: No rashes or lesions    LABS  06-03    142  |  105  |  41<H>  ----------------------------<  103<H>  4.4   |  20  |  1.5    Ca    9.7      03 Jun 2020 06:20  Mg     1.7     06-02    TPro  5.6<L>  /  Alb  3.5  /  TBili  0.6  /  DBili  x   /  AST  24  /  ALT  16  /  AlkPhos  68  06-01                          13.5   12.07 )-----------( 215      ( 03 Jun 2020 06:20 )             41.7     PT/INR - ( 01 Jun 2020 16:39 )   PT: 16.10 sec;   INR: 1.40 ratio         PTT - ( 01 Jun 2020 16:39 )  PTT:27.4 sec    CARDIAC ENZYMES    Troponin T, Serum: 0.04 ng/mL (06-02-20 @ 06:46)  Troponin T, Serum: 0.03 ng/mL (06-01-20 @ 22:10)  Troponin T, Serum: 0.03 ng/mL (06-01-20 @ 16:39)      Culture Results:   No Growth Final (05-16-20)  Culture Results:   No Growth Final (05-16-20)    RADIOLOGY  < from: Xray Chest 1 View- PORTABLE-Routine (06.02.20 @ 09:42) >  Impression:    Increase left perihilar/basilar opacity/effusion.    Increased less extensive right basilar opacity. No pneumothorax      < end of copied text >    MEDICATIONS  (STANDING):  chlorhexidine 4% Liquid 1 Application(s) Topical <User Schedule>  heparin  Infusion. 1000 Unit(s)/Hr (10 mL/Hr) IV Continuous <Continuous>  lisinopril 10 milliGRAM(s) Oral daily  metoprolol tartrate 50 milliGRAM(s) Oral two times a day  pantoprazole    Tablet 40 milliGRAM(s) Oral before breakfast  predniSONE   Tablet 10 milliGRAM(s) Oral daily    MEDICATIONS  (PRN):  acetaminophen   Tablet .. 650 milliGRAM(s) Oral every 6 hours PRN Temp greater or equal to 38C (100.4F), Mild Pain (1 - 3)

## 2020-06-03 NOTE — CHART NOTE - NSCHARTNOTEFT_GEN_A_CORE
Patient is a 80y old  Female who presents with a chief complaint of pleural effusion and diagnosed with SVT on Tele (03 Jun 2020 09:19)      Vitals  T(C): 35.8 (06-03-20 @ 05:27), Max: 36.1 (06-02-20 @ 21:06)  HR: 84 (06-03-20 @ 05:27) (84 - 87)  BP: 125/70 (06-03-20 @ 05:49) (121/72 - 134/87)  RR: 18 (06-03-20 @ 05:49) (18 - 18)  SpO2: 96% (06-03-20 @ 05:49) (96% - 96%)    Plan:   Pulmonary planning to tap the patient, therefore, will order CT Chest NC   Will dc eliquis, last dose given at 5am   Will start Heparin drip at a rate of 1000 (18 x 62kgs) 4pm for labs at 11pm for PTT   Will request the night PA to follow up and increase/decrease rate appropriately   DC IV Fluids   Ordered labs for later and am.   Discussed with the medical attending

## 2020-06-03 NOTE — CHART NOTE - NSCHARTNOTEFT_GEN_A_CORE
Started Heparin drip at a rate of 1000 (18 x 62kgs)   Follow up 11pm labs for PTT and increase/decrease rate appropriately  Signed out to Night PA

## 2020-06-03 NOTE — CHART NOTE - NSCHARTNOTEFT_GEN_A_CORE
called by nurse, pt had a rapid heart rate of 170's, /55 O2Sat 94%. Rhythm appears to have been SVT. Pt was given 15mg cardizem IVP, HR stabilized to 85, 134/87 O2Sat 96%. Pt seen at bedside, denies any complaints at this time including chest pain. Will continue to monitor.

## 2020-06-04 LAB
ALBUMIN SERPL ELPH-MCNC: 3.4 G/DL — LOW (ref 3.5–5.2)
ALP SERPL-CCNC: 65 U/L — SIGNIFICANT CHANGE UP (ref 30–115)
ALT FLD-CCNC: 16 U/L — SIGNIFICANT CHANGE UP (ref 0–41)
ANION GAP SERPL CALC-SCNC: 19 MMOL/L — HIGH (ref 7–14)
APTT BLD: 188.4 SEC — CRITICAL HIGH (ref 27–39.2)
APTT BLD: 34.4 SEC — SIGNIFICANT CHANGE UP (ref 27–39.2)
APTT BLD: 71.4 SEC — CRITICAL HIGH (ref 27–39.2)
AST SERPL-CCNC: 26 U/L — SIGNIFICANT CHANGE UP (ref 0–41)
BILIRUB SERPL-MCNC: 0.6 MG/DL — SIGNIFICANT CHANGE UP (ref 0.2–1.2)
BUN SERPL-MCNC: 40 MG/DL — HIGH (ref 10–20)
CALCIUM SERPL-MCNC: 9.6 MG/DL — SIGNIFICANT CHANGE UP (ref 8.5–10.1)
CHLORIDE SERPL-SCNC: 107 MMOL/L — SIGNIFICANT CHANGE UP (ref 98–110)
CO2 SERPL-SCNC: 22 MMOL/L — SIGNIFICANT CHANGE UP (ref 17–32)
CREAT SERPL-MCNC: 1.3 MG/DL — SIGNIFICANT CHANGE UP (ref 0.7–1.5)
GLUCOSE SERPL-MCNC: 86 MG/DL — SIGNIFICANT CHANGE UP (ref 70–99)
HCT VFR BLD CALC: 40.1 % — SIGNIFICANT CHANGE UP (ref 37–47)
HGB BLD-MCNC: 13 G/DL — SIGNIFICANT CHANGE UP (ref 12–16)
MAGNESIUM SERPL-MCNC: 2.2 MG/DL — SIGNIFICANT CHANGE UP (ref 1.8–2.4)
MCHC RBC-ENTMCNC: 31.6 PG — HIGH (ref 27–31)
MCHC RBC-ENTMCNC: 32.4 G/DL — SIGNIFICANT CHANGE UP (ref 32–37)
MCV RBC AUTO: 97.3 FL — SIGNIFICANT CHANGE UP (ref 81–99)
NRBC # BLD: 0 /100 WBCS — SIGNIFICANT CHANGE UP (ref 0–0)
PLATELET # BLD AUTO: 174 K/UL — SIGNIFICANT CHANGE UP (ref 130–400)
POTASSIUM SERPL-MCNC: 3.8 MMOL/L — SIGNIFICANT CHANGE UP (ref 3.5–5)
POTASSIUM SERPL-SCNC: 3.8 MMOL/L — SIGNIFICANT CHANGE UP (ref 3.5–5)
PROT SERPL-MCNC: 5.5 G/DL — LOW (ref 6–8)
RBC # BLD: 4.12 M/UL — LOW (ref 4.2–5.4)
RBC # FLD: 15.3 % — HIGH (ref 11.5–14.5)
SODIUM SERPL-SCNC: 148 MMOL/L — HIGH (ref 135–146)
WBC # BLD: 10.87 K/UL — HIGH (ref 4.8–10.8)
WBC # FLD AUTO: 10.87 K/UL — HIGH (ref 4.8–10.8)

## 2020-06-04 PROCEDURE — 99233 SBSQ HOSP IP/OBS HIGH 50: CPT

## 2020-06-04 RX ORDER — HEPARIN SODIUM 5000 [USP'U]/ML
800 INJECTION INTRAVENOUS; SUBCUTANEOUS
Qty: 25000 | Refills: 0 | Status: DISCONTINUED | OUTPATIENT
Start: 2020-06-04 | End: 2020-06-04

## 2020-06-04 RX ORDER — HEPARIN SODIUM 5000 [USP'U]/ML
900 INJECTION INTRAVENOUS; SUBCUTANEOUS
Qty: 25000 | Refills: 0 | Status: DISCONTINUED | OUTPATIENT
Start: 2020-06-04 | End: 2020-06-05

## 2020-06-04 RX ORDER — FUROSEMIDE 40 MG
20 TABLET ORAL ONCE
Refills: 0 | Status: COMPLETED | OUTPATIENT
Start: 2020-06-04 | End: 2020-06-04

## 2020-06-04 RX ORDER — HEPARIN SODIUM 5000 [USP'U]/ML
5000 INJECTION INTRAVENOUS; SUBCUTANEOUS ONCE
Refills: 0 | Status: COMPLETED | OUTPATIENT
Start: 2020-06-04 | End: 2020-06-04

## 2020-06-04 RX ORDER — MORPHINE SULFATE 50 MG/1
2 CAPSULE, EXTENDED RELEASE ORAL EVERY 4 HOURS
Refills: 0 | Status: DISCONTINUED | OUTPATIENT
Start: 2020-06-04 | End: 2020-06-10

## 2020-06-04 RX ADMIN — HEPARIN SODIUM 800 UNIT(S)/HR: 5000 INJECTION INTRAVENOUS; SUBCUTANEOUS at 18:09

## 2020-06-04 RX ADMIN — Medication 10 MILLIGRAM(S): at 06:14

## 2020-06-04 RX ADMIN — CHLORHEXIDINE GLUCONATE 1 APPLICATION(S): 213 SOLUTION TOPICAL at 06:14

## 2020-06-04 RX ADMIN — MORPHINE SULFATE 2 MILLIGRAM(S): 50 CAPSULE, EXTENDED RELEASE ORAL at 17:13

## 2020-06-04 RX ADMIN — Medication 50 MILLIGRAM(S): at 17:13

## 2020-06-04 RX ADMIN — HEPARIN SODIUM 5000 UNIT(S): 5000 INJECTION INTRAVENOUS; SUBCUTANEOUS at 23:58

## 2020-06-04 RX ADMIN — PANTOPRAZOLE SODIUM 40 MILLIGRAM(S): 20 TABLET, DELAYED RELEASE ORAL at 06:14

## 2020-06-04 RX ADMIN — MORPHINE SULFATE 2 MILLIGRAM(S): 50 CAPSULE, EXTENDED RELEASE ORAL at 11:41

## 2020-06-04 RX ADMIN — HEPARIN SODIUM 900 UNIT(S)/HR: 5000 INJECTION INTRAVENOUS; SUBCUTANEOUS at 23:58

## 2020-06-04 RX ADMIN — Medication 50 MILLIGRAM(S): at 06:14

## 2020-06-04 RX ADMIN — Medication 20 MILLIGRAM(S): at 09:54

## 2020-06-04 RX ADMIN — LISINOPRIL 10 MILLIGRAM(S): 2.5 TABLET ORAL at 06:14

## 2020-06-04 NOTE — PROGRESS NOTE ADULT - SUBJECTIVE AND OBJECTIVE BOX
Patient feels SOB this am       T(F): 97.3 (06-04-20 @ 05:25), Max: 97.3 (06-04-20 @ 05:25)  HR: 100 (06-04-20 @ 09:20)  BP: 136/90 (06-04-20 @ 09:20)  RR: 22  SpO2: 91% (06-04-20 @ 09:20) (91% - 91%)    PHYSICAL EXAM:  GENERAL: NAD  HEAD:  Atraumatic, Normocephalic  NERVOUS SYSTEM:  no focal deficits   CHEST/LUNG:  bilateral rhonchi  HEART: Regular rate and rhythm; No murmurs, rubs, or gallops  ABDOMEN: Soft, Nontender, Nondistended; Bowel sounds present  EXTREMITIES:  2+ Peripheral Pulses, No clubbing, cyanosis, or edema      LABS  06-04    148<H>  |  107  |  40<H>  ----------------------------<  86  3.8   |  22  |  1.3    Ca    9.6      04 Jun 2020 05:38  Mg     2.2     06-04    TPro  5.5<L>  /  Alb  3.4<L>  /  TBili  0.6  /  DBili  x   /  AST  26  /  ALT  16  /  AlkPhos  65  06-04                          13.0   10.87 )-----------( 174      ( 04 Jun 2020 05:38 )             40.1     PTT - ( 04 Jun 2020 05:38 )  PTT:188.4 sec    CARDIAC ENZYMES  Creatine Kinase, Serum: 49 (06-03 @ 21:33)  Creatine Kinase, Serum: 61 (06-03 @ 19:00)  Creatine Kinase, Serum: 55 (06-03 @ 15:34)    CKMB Units: 6.1 (06-03 @ 21:33)  CKMB Units: 6.6 (06-03 @ 19:00)  CKMB Units: 7.0 (06-03 @ 15:34)    Troponin T, Serum: 0.05 ng/mL (06-03-20 @ 21:33)  Troponin T, Serum: 0.04 ng/mL (06-03-20 @ 19:00)  Troponin T, Serum: 0.03 ng/mL (06-03-20 @ 15:34)  Troponin T, Serum: 0.04 ng/mL (06-02-20 @ 06:46)  Troponin T, Serum: 0.03 ng/mL (06-01-20 @ 22:10)  Troponin T, Serum: 0.03 ng/mL (06-01-20 @ 16:39)      Culture Results:   No growth to date. (06-02-20)  Culture Results:   <10,000 CFU/mL Normal Urogenital Awidla (06-02-20)  Culture Results:   No Growth Final (05-16-20)  Culture Results:   No Growth Final (05-16-20)    RADIOLOGY  < from: CT Chest No Cont (06.03.20 @ 12:00) >  IMPRESSION:      1. Extensive left axillary, supraclavicular and intrathoracic lymphadenopathy compatible with the patient's history of lymphoma.    2. Interlobular septal thickening and groundglass opacities, findings may represent underlying lymphangitic spread of tumor versus pulmonary edema. Correlation with fluid analysis is recommended.    3. Increased moderate to large bilateral pleural effusions with adjacent atelectasis.      < end of copied text >    MEDICATIONS  (STANDING):  chlorhexidine 4% Liquid 1 Application(s) Topical <User Schedule>  heparin  Infusion. 1000 Unit(s)/Hr (10 mL/Hr) IV Continuous <Continuous>  lisinopril 10 milliGRAM(s) Oral daily  metoprolol tartrate 50 milliGRAM(s) Oral two times a day  pantoprazole    Tablet 40 milliGRAM(s) Oral before breakfast  predniSONE   Tablet 10 milliGRAM(s) Oral daily    MEDICATIONS  (PRN):  acetaminophen   Tablet .. 650 milliGRAM(s) Oral every 6 hours PRN Temp greater or equal to 38C (100.4F), Mild Pain (1 - 3)

## 2020-06-04 NOTE — CHART NOTE - NSCHARTNOTEFT_GEN_A_CORE
Heparin drip rate was not changed by the NIGHT TEAM   Will hold Heparin for 1 hour   Will order Heparin at a rate of 800units/hr   Ordered PTT for 10pm   Night team will monitor and correct dose accordingly   Pt to have thoracocentesis tomorrow with Pulmonary Heparin drip rate was not changed on chart by the NIGHT TEAM   Will hold Heparin for 1 hour   Will order Heparin at a rate of 800units/hr   Ordered PTT for 10pm   Night team will monitor and correct dose accordingly   Pt to have thoracocentesis tomorrow with Pulmonary

## 2020-06-04 NOTE — PROGRESS NOTE ADULT - SUBJECTIVE AND OBJECTIVE BOX
Patient is a 80y old  Female who presents with a chief complaint of pleural effusion (03 Jun 2020 09:19)      INTERVAL HISTORY/overnight events  not on distress lying flat in bed   s.p lasix   cxr afternoon mild decrease in effusion       Vital Signs Last 24 Hrs  T(C): 36.3 (04 Jun 2020 05:25), Max: 36.3 (04 Jun 2020 05:25)  T(F): 97.3 (04 Jun 2020 05:25), Max: 97.3 (04 Jun 2020 05:25)  HR: 98 (04 Jun 2020 05:25) (55 - 176)  BP: 118/56 (04 Jun 2020 05:25) (109/58 - 124/83)  BP(mean): --  RR: 16 (04 Jun 2020 05:25) (16 - 20)  SpO2: --  Daily     Daily   I&O's Summary    03 Jun 2020 07:01  -  04 Jun 2020 07:00  --------------------------------------------------------  IN: 40 mL / OUT: 600 mL / NET: -560 mL        Physical Examination:    General: No acute distress.  Alert, oriented, interactive, nonfocal    HEENT: Pupils equal, reactive to light.  Symmetric.    PULM: Clear to auscultation bilaterally, no significant sputum production    CVS: Regular rate and rhythm, no murmurs, rubs, or gallops    ABD: Soft, nondistended, nontender, normoactive bowel sounds, no masses    EXT: No edema, nontender    SKIN: Warm and well perfused, no rashes noted.    Neurology: no focal deficit     Musculoskeletal : Move all extremety         Lab Results:                        13.0   10.87 )-----------( 174      ( 04 Jun 2020 05:38 )             40.1     04 Jun 2020 05:38    148    |  107    |  40     ----------------------------<  86     3.8     |  22     |  1.3      Ca    9.6        04 Jun 2020 05:38  Mg     2.2       04 Jun 2020 05:38    TPro  5.5    /  Alb  3.4    /  TBili  0.6    /  DBili  x      /  AST  26     /  ALT  16     /  AlkPhos  65     04 Jun 2020 05:38  Amylase x     lipase x        PTT - ( 04 Jun 2020 05:38 )  PTT:188.4 sec    CARDIAC MARKERS ( 03 Jun 2020 21:33 )  x     / 0.05 ng/mL / 49 U/L / x     / 6.1 ng/mL  CARDIAC MARKERS ( 03 Jun 2020 19:00 )  x     / 0.04 ng/mL / 61 U/L / x     / 6.6 ng/mL  CARDIAC MARKERS ( 03 Jun 2020 15:34 )  x     / 0.03 ng/mL / 55 U/L / x     / 7.0 ng/mL        Microbiology    Culture - Blood (collected 02 Jun 2020 06:46)  Source: .Blood None  Preliminary Report (03 Jun 2020 18:01):    No growth to date.    Culture - Urine (collected 02 Jun 2020 05:50)  Source: .Urine Clean Catch (Midstream)  Final Report (03 Jun 2020 15:40):    <10,000 CFU/mL Normal Urogenital Awilda        Medication:  MEDICATIONS  (STANDING):  chlorhexidine 4% Liquid 1 Application(s) Topical <User Schedule>  heparin  Infusion. 1000 Unit(s)/Hr (10 mL/Hr) IV Continuous <Continuous>  lisinopril 10 milliGRAM(s) Oral daily  metoprolol tartrate 50 milliGRAM(s) Oral two times a day  pantoprazole    Tablet 40 milliGRAM(s) Oral before breakfast  predniSONE   Tablet 10 milliGRAM(s) Oral daily    MEDICATIONS  (PRN):  acetaminophen   Tablet .. 650 milliGRAM(s) Oral every 6 hours PRN Temp greater or equal to 38C (100.4F), Mild Pain (1 - 3)        IMAGING STUDIES:    < from: CT Chest No Cont (06.03.20 @ 12:00) >  2. Interlobular septal thickening and groundglass opacities, findings may represent underlying lymphangitic spread of tumor versus pulmonary edema. Correlation with fluid analysis is recommended.    3. Increased moderate to large bilateral pleural effusions with adjacent atelectas    < end of copied text >

## 2020-06-04 NOTE — CHART NOTE - NSCHARTNOTEFT_GEN_A_CORE
Started Heparin drip last night   Follow up 11pm labs for PTT and increase/decrease rate appropriately  Desired PTT is 55-60   NPO p midnight   Stop Heparin drip at 5am (Provider to RN ordered)   Signed out to Night PA. Started Heparin drip last night with 1000 units, Night team decreased the dose by 200 units. However order is not shown. Will correct after 3pm labs, which is still pending  Follow up 11pm labs for PTT and increase/decrease rate appropriately  Desired PTT is 55-60   NPO p midnight   Stop Heparin drip at 5am (Provider to RN ordered)   Signed out to Night PA.

## 2020-06-04 NOTE — PROGRESS NOTE ADULT - SUBJECTIVE AND OBJECTIVE BOX
Patient is a 80y old  Female who presents with a chief complaint of weakness (04 Jun 2020 09:03)      T(F): 97.3 (06-04-20 @ 05:25), Max: 97.3 (06-04-20 @ 05:25)  HR: 98 (06-04-20 @ 05:25)  BP: 118/56 (06-04-20 @ 05:25)  RR: 16 (06-04-20 @ 05:25)  SpO2: --    PHYSICAL EXAM:  GENERAL: NAD, well-groomed, well-developed  HEAD:  Atraumatic, Normocephalic  EYES: EOMI, PERRLA, conjunctiva and sclera clear  ENMT: No tonsillar erythema, exudates, or enlargement; Moist mucous membranes, Good dentition, No lesions  NECK: Supple, No JVD, Normal thyroid  NERVOUS SYSTEM:  Alert & Oriented X3,  Motor Strength 5/5 B/L upper and lower extremities  CHEST/LUNG: Clear to percussion bilaterally; No rales, rhonchi, wheezing, or rubs Decreased bs  HEART: Regular rate and rhythm; No murmurs, rubs, or gallops  ABDOMEN: Soft, Nontender, Nondistended; Bowel sounds present  EXTREMITIES:   No clubbing, cyanosis, or edema  LYMPH: No lymphadenopathy noted  SKIN: No rashes or lesions    labs  06-04    148<H>  |  107  |  40<H>  ----------------------------<  86  3.8   |  22  |  1.3    Ca    9.6      04 Jun 2020 05:38  Mg     2.2     06-04    TPro  5.5<L>  /  Alb  3.4<L>  /  TBili  0.6  /  DBili  x   /  AST  26  /  ALT  16  /  AlkPhos  65  06-04                          13.0   10.87 )-----------( 174      ( 04 Jun 2020 05:38 )             40.1       Culture - Blood (collected 02 Jun 2020 06:46)  Source: .Blood None  Preliminary Report (03 Jun 2020 18:01):    No growth to date.    Culture - Urine (collected 02 Jun 2020 05:50)  Source: .Urine Clean Catch (Midstream)  Final Report (03 Jun 2020 15:40):    <10,000 CFU/mL Normal Urogenital Awilda      PTT - ( 04 Jun 2020 05:38 )  PTT:188.4 sec    Creatine Kinase, Serum: 49 U/L (06-03-20 @ 21:33)  Troponin T, Serum: 0.05 ng/mL <HH> (06-03-20 @ 21:33)  Creatine Kinase, Serum: 61 U/L (06-03-20 @ 19:00)  Troponin T, Serum: 0.04 ng/mL <HH> (06-03-20 @ 19:00)  Creatine Kinase, Serum: 55 U/L (06-03-20 @ 15:34)  Troponin T, Serum: 0.03 ng/mL <HH> (06-03-20 @ 15:34)      acetaminophen   Tablet .. 650 milliGRAM(s) Oral every 6 hours PRN  chlorhexidine 4% Liquid 1 Application(s) Topical <User Schedule>  heparin  Infusion. 1000 Unit(s)/Hr IV Continuous <Continuous>  lisinopril 10 milliGRAM(s) Oral daily  metoprolol tartrate 50 milliGRAM(s) Oral two times a day  pantoprazole    Tablet 40 milliGRAM(s) Oral before breakfast  predniSONE   Tablet 10 milliGRAM(s) Oral daily

## 2020-06-04 NOTE — PROGRESS NOTE ADULT - ASSESSMENT
Patient lying flat. No complaints. Echo lv function normal. Appreciate pulmonary. Can try lasix. Watch lytes. Resume AC after thoracentesis if done

## 2020-06-04 NOTE — CHART NOTE - NSCHARTNOTEFT_GEN_A_CORE
Asked by RN to call the family for an update   Called and spoke with  Roman Stern @ [phone]  Discussed the events happened yesterday   Discussed labs   Discussed vital signs   Discussed consults and progress   Discussed medications   Answered all the family questions

## 2020-06-04 NOTE — CHART NOTE - NSCHARTNOTEFT_GEN_A_CORE
Patient is a 80y old  Female who presents with a chief complaint of pleural effusion and diagnosed with SVT on Tele  Currently having difficulty breathing 91% on 4L      Vitals  T(C): 36.3 (06-04-20 @ 05:25), Max: 36.3 (06-04-20 @ 05:25)  HR: 100 (06-04-20 @ 09:20) (55 - 176)  BP: 136/90 (06-04-20 @ 09:20) (109/58 - 136/90)  RR: 30 (06-04-20 @ 09:20) (16 - 30)  SpO2: 91% (06-04-20 @ 09:20) (91% - 91%)      Plan:   - Furosemide 20mg IV once   - PTT for 3pm and will determine if the rate needs to be changed (ideal PTT 55-60)  - CXR for am ordered   - Possible thoracocentesis tomorrow am, therefore, will hold heparin starting 5am  - If no procedure, will continue heparin   - As pulmonary recommends, if any fever start merop and vanco  - Echo LV function normal.  - If SVT occurs again, will give Cardizem and if uncontrolled will try Amiodarone.   - Discussed the case and plan with the medical attending      Results of Labs/Imaging discussed as well as patient's plan.    All patient's/family questions answered.   Patient and family encouraged to contact PA with any further issues.

## 2020-06-04 NOTE — PHYSICAL THERAPY INITIAL EVALUATION ADULT - SPECIFY REASON(S)
As discussed with RN, patient still not medically stable for participation in PT evaluation at this time. Patient with shortness of breath, not eating / drinking.  PT will continue to follow up.

## 2020-06-04 NOTE — PROGRESS NOTE ADULT - ASSESSMENT
Impression   pleural effusion ?? failure    history of lymphoma with mets   arrythmia   history PE     plan :  off  eliquis on heparin IV for possible thoracentesis depend ct result   do ct chest no contrast to evaluate the left side   keep negative balance consider lasix 40 mg once   d/c fluid   if any fever start merop and vanco   if effusion persist will do bed side US to evaluate if need tap   on eliquis now   keep pox > 92 %   rate control follow cardiology   case discussed with [facility] Impression   pleural effusion ?? failure    history of lymphoma with mets   arrythmia   history PE     plan :  off  eliquis on heparin IV for possible thoracentesis hold in am for possible thoracentessi   cxr heladio    keep negative balance consider lasix 20  mg once   if any fever start merop and vanco   if effusion persist will do bed side US to evaluate if need tap     keep pox > 92 %   rate control follow cardiology   case discussed with [facility]

## 2020-06-04 NOTE — PROGRESS NOTE ADULT - ASSESSMENT
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 At night time her mental status would be  worse. Not ambulating at all, mainly in bed all day.     Last chemo was 4 weeks ago , prior to the last admission.   No longer taking methotrexate for her RA    ON LAST ADMISSION 3 WEEKS AGO :     CTA chest revealed acute bilateral pulmonary emboli within the distal portion of the right main pulmonary artery and proximal left lower lobe arteries. Echocardiogram confirmed severe right heart strain. Patient was admitted to the ICU at [facility]. Patient was evaluated by IR, who deemed that the patient was not a candidate for embolectomy. An IVC filter was placed on 5/18/2020 and the patient was fully anticoagulated. Outpatient MRI head 1 week prior to admission showed no evidence of brain metastases.      pt was  found with svt which resolved after IV Cardizem given, no events overnight   Pt this am c/o sob        Lymphoma /DVT & PE s/p IVC filter / SVT / HTN / Hypomagnesemia  / RA / weakness and difficulty ambulating    SOB - probably multifactorial 1) Pleural effusion - fluid overload, 2) spread of lymphoma      - give Lasix again today   - continue IV Heparin and adjust dose based on 3pm ptt today   - hold heparin IV at 5am tomorrow for possible thoracentesis in am    -  magnesium levels now normal   - continue metoprolol 50mg q12h   - tele / cardio following    - no antibiotics for now, If spikes fever would start   - ct chest as above   -  urine and blood cultures are negative thus far     - pt/rehab   - continue  prednisone and  Lisinopril    I spoke with pts  regarding pts current condition and poor prognosis  he asks for DNR, DNI and is considering hospice care

## 2020-06-04 NOTE — CHART NOTE - NSCHARTNOTEFT_GEN_A_CORE
PTT - ( 04 Jun 2020 21:42 )  PTT:  34.4 sec  Will increase heparin to 900units and recheck ptt in am.

## 2020-06-04 NOTE — CHART NOTE - NSCHARTNOTEFT_GEN_A_CORE
81y/o female on heparin drip, [id number] at 10 PM rate reduced by 100u/h. Last ptt  at 430am  is 188 hold heparin x 2 hours reduce rate by 200/h.  plan f/u next ptt  will sign out to incoming PA

## 2020-06-05 LAB
ALBUMIN SERPL ELPH-MCNC: 3.1 G/DL — LOW (ref 3.5–5.2)
ALP SERPL-CCNC: 65 U/L — SIGNIFICANT CHANGE UP (ref 30–115)
ALT FLD-CCNC: 15 U/L — SIGNIFICANT CHANGE UP (ref 0–41)
ANION GAP SERPL CALC-SCNC: 19 MMOL/L — HIGH (ref 7–14)
APTT BLD: 65.2 SEC — HIGH (ref 27–39.2)
APTT BLD: 83 SEC — CRITICAL HIGH (ref 27–39.2)
APTT BLD: 95.2 SEC — CRITICAL HIGH (ref 27–39.2)
AST SERPL-CCNC: 28 U/L — SIGNIFICANT CHANGE UP (ref 0–41)
B PERT IGG+IGM PNL SER: CLEAR — SIGNIFICANT CHANGE UP
BILIRUB SERPL-MCNC: 0.7 MG/DL — SIGNIFICANT CHANGE UP (ref 0.2–1.2)
BUN SERPL-MCNC: 39 MG/DL — HIGH (ref 10–20)
CALCIUM SERPL-MCNC: 9.1 MG/DL — SIGNIFICANT CHANGE UP (ref 8.5–10.1)
CHLORIDE SERPL-SCNC: 102 MMOL/L — SIGNIFICANT CHANGE UP (ref 98–110)
CO2 SERPL-SCNC: 20 MMOL/L — SIGNIFICANT CHANGE UP (ref 17–32)
COLOR FLD: YELLOW — SIGNIFICANT CHANGE UP
CREAT SERPL-MCNC: 1.1 MG/DL — SIGNIFICANT CHANGE UP (ref 0.7–1.5)
FLUID INTAKE SUBSTANCE CLASS: SIGNIFICANT CHANGE UP
FLUID SEGMENTED GRANULOCYTES: 5 % — SIGNIFICANT CHANGE UP
GLUCOSE SERPL-MCNC: 82 MG/DL — SIGNIFICANT CHANGE UP (ref 70–99)
GRAM STN FLD: SIGNIFICANT CHANGE UP
HCT VFR BLD CALC: 38.5 % — SIGNIFICANT CHANGE UP (ref 37–47)
HGB BLD-MCNC: 12.4 G/DL — SIGNIFICANT CHANGE UP (ref 12–16)
LDH SERPL L TO P-CCNC: 380 — HIGH (ref 50–242)
LYMPHOCYTES # FLD: 83 % — SIGNIFICANT CHANGE UP
MCHC RBC-ENTMCNC: 31.1 PG — HIGH (ref 27–31)
MCHC RBC-ENTMCNC: 32.2 G/DL — SIGNIFICANT CHANGE UP (ref 32–37)
MCV RBC AUTO: 96.5 FL — SIGNIFICANT CHANGE UP (ref 81–99)
MONOS+MACROS # FLD: 12 % — SIGNIFICANT CHANGE UP
NRBC # BLD: 0 /100 WBCS — SIGNIFICANT CHANGE UP (ref 0–0)
PLATELET # BLD AUTO: 136 K/UL — SIGNIFICANT CHANGE UP (ref 130–400)
POTASSIUM SERPL-MCNC: 3.9 MMOL/L — SIGNIFICANT CHANGE UP (ref 3.5–5)
POTASSIUM SERPL-SCNC: 3.9 MMOL/L — SIGNIFICANT CHANGE UP (ref 3.5–5)
PROT SERPL-MCNC: 5 G/DL — LOW (ref 6–8)
RBC # BLD: 3.99 M/UL — LOW (ref 4.2–5.4)
RBC # FLD: 15.2 % — HIGH (ref 11.5–14.5)
RCV VOL RI: 0 /UL — SIGNIFICANT CHANGE UP (ref 0–0)
SODIUM SERPL-SCNC: 141 MMOL/L — SIGNIFICANT CHANGE UP (ref 135–146)
SPECIMEN SOURCE: SIGNIFICANT CHANGE UP
TOTAL NUCLEATED CELL COUNT, BODY FLUID: 361 /UL — SIGNIFICANT CHANGE UP
TUBE TYPE: SIGNIFICANT CHANGE UP
WBC # BLD: 9.25 K/UL — SIGNIFICANT CHANGE UP (ref 4.8–10.8)
WBC # FLD AUTO: 9.25 K/UL — SIGNIFICANT CHANGE UP (ref 4.8–10.8)

## 2020-06-05 PROCEDURE — 88112 CYTOPATH CELL ENHANCE TECH: CPT | Mod: 26

## 2020-06-05 PROCEDURE — 88305 TISSUE EXAM BY PATHOLOGIST: CPT | Mod: 26

## 2020-06-05 PROCEDURE — 99233 SBSQ HOSP IP/OBS HIGH 50: CPT

## 2020-06-05 PROCEDURE — 71045 X-RAY EXAM CHEST 1 VIEW: CPT | Mod: 26,76

## 2020-06-05 PROCEDURE — 88189 FLOWCYTOMETRY/READ 16 & >: CPT

## 2020-06-05 RX ORDER — DILTIAZEM HCL 120 MG
10 CAPSULE, EXT RELEASE 24 HR ORAL ONCE
Refills: 0 | Status: COMPLETED | OUTPATIENT
Start: 2020-06-05 | End: 2020-06-05

## 2020-06-05 RX ORDER — HEPARIN SODIUM 5000 [USP'U]/ML
800 INJECTION INTRAVENOUS; SUBCUTANEOUS
Qty: 25000 | Refills: 0 | Status: DISCONTINUED | OUTPATIENT
Start: 2020-06-05 | End: 2020-06-06

## 2020-06-05 RX ORDER — ONDANSETRON 8 MG/1
4 TABLET, FILM COATED ORAL ONCE
Refills: 0 | Status: COMPLETED | OUTPATIENT
Start: 2020-06-05 | End: 2020-06-05

## 2020-06-05 RX ADMIN — ONDANSETRON 4 MILLIGRAM(S): 8 TABLET, FILM COATED ORAL at 13:47

## 2020-06-05 RX ADMIN — LISINOPRIL 10 MILLIGRAM(S): 2.5 TABLET ORAL at 05:27

## 2020-06-05 RX ADMIN — Medication 10 MILLIGRAM(S): at 13:49

## 2020-06-05 RX ADMIN — MORPHINE SULFATE 2 MILLIGRAM(S): 50 CAPSULE, EXTENDED RELEASE ORAL at 13:48

## 2020-06-05 RX ADMIN — Medication 10 MILLIGRAM(S): at 05:26

## 2020-06-05 RX ADMIN — Medication 50 MILLIGRAM(S): at 05:27

## 2020-06-05 RX ADMIN — HEPARIN SODIUM 800 UNIT(S)/HR: 5000 INJECTION INTRAVENOUS; SUBCUTANEOUS at 11:55

## 2020-06-05 RX ADMIN — Medication 50 MILLIGRAM(S): at 17:16

## 2020-06-05 RX ADMIN — PANTOPRAZOLE SODIUM 40 MILLIGRAM(S): 20 TABLET, DELAYED RELEASE ORAL at 05:26

## 2020-06-05 NOTE — CHART NOTE - NSCHARTNOTEFT_GEN_A_CORE
Registered Dietitian Follow-Up     Patient Profile Reviewed                           Yes [x]   No []     Nutrition History Previously Obtained        Yes []  No []       Pertinent Subjective Information:     Pertinent Medical Interventions: Pt with h/o lymphoma (last chemo was 4 weeks ago) was admitted for weakness, difficulty ambulating and SOB. As per MD SOB is probably multifactorial due to pleural effusion - fluid overload and spread of lymphoma. GOC-  considering hospice, for now DNR, DNI and no PEG placement.      Diet order:      *** 3 day calorie count:          Anthropometrics:  - Ht.  - Wt.  - %wt change  - BMI  - IBW     Pertinent Lab Data:     Pertinent Meds:     Physical Findings:  - Appearance:  - GI function:  - Tubes:  - Oral/Mouth cavity:  - Skin:     Nutrition Requirements  Weight Used:     Estimated Energy Needs    Continue []  Adjust []  Adjusted Energy Recommendations:   kcal/day        Estimated Protein Needs    Continue []  Adjust []  Adjusted Protein Recommendations:   gm/day        Estimated Fluid Needs        Continue []  Adjust []  Adjusted Fluid Recommendations:   mL/day     Nutrient Intake:        [] Previous Nutrition Diagnosis:            [] Ongoing          [] Resolved    [] No active nutrition diagnosis identified at this time     Nutrition Diagnostic #1  Problem:  Etiology:  Statement:     Nutrition Diagnostic #2  Problem:  Etiology:  Statement:     Nutrition Intervention      Goal/Expected Outcome:     Indicator/Monitoring: Registered Dietitian Follow-Up     Patient Profile Reviewed                           Yes [x]   No []     Nutrition History Previously Obtained        Yes []  No []       Pertinent Subjective Information:     Pertinent Medical Interventions: Pt with h/o lymphoma (last chemo was 4 weeks ago) was admitted for weakness, difficulty ambulating and SOB. As per MD SOB is probably multifactorial due to pleural effusion - fluid overload and spread of lymphoma. GOC-  considering hospice, for now DNR, DNI and no PEG placement. ? possible thoracentesis today     Diet order: NPO since midnight, pt has active order for DASH/TLC diet w/ Ensure Enlive q 12hrs      *** 3 day calorie count:          Anthropometrics:  - Ht. 160.2cm  - Wt. no new weights. 62kg (6/2)  - %wt change  - BMI - 24.2  - IBW - 52.3kg +/-10%.     Pertinent Lab Data: 6/4 BUN- 40, Na 148, ALb 3.4      Pertinent Meds: morphine, lopressor, zestril, protoniox, prednisone, off lasix      Physical Findings:  - Appearance:  - GI function: No documented BMs  - Tubes:  - Oral/Mouth cavity:  - Skin: ecchymosis.      Nutrition Requirements  Weight Used: 62kg     Estimated energy needs:   Estimated protein needs: 74-87gm/day (1.2-1.4gm/kg act wt)   Estimated fluid needs:       Estimated Energy Needs    Continue [x]  Adjust []  1600 - 2000kcal/day based on 1400-1600kcal/d (MSJ x 1.3-1.5) compared to 1860-2170kcal/day(30-35kcal/kg act wt)  Adjusted Energy Recommendations:   kcal/day        Estimated Protein Needs    Continue [x]  Adjust [] 74-87gm/day (1.2-1.4gm/kg act wt)   Adjusted Protein Recommendations:   gm/day        Estimated Fluid Needs        Continue [x]  Adjust []  1ml/1kcal or per LIP   Adjusted Fluid Recommendations:   mL/day     Nutrient Intake:        [x] Previous Nutrition Diagnosis: Predicted suboptimal energy intake.             [] Ongoing          [] Resolved    [] No active nutrition diagnosis identified at this time     Nutrition Diagnostic #1  Problem:  Etiology:  Statement:     Nutrition Diagnostic #2  Problem:  Etiology:  Statement:     Nutrition Intervention: Meal and snacks. Supplements.      Goal/Expected Outcome: PO intake > 50% meals, snacks and supplements upon f/u in 4 days.     Indicator/Monitoring:  Will monitor energy intake, diet order, labs, body composition, NFPF.     Recommended: ... in progress     liberalize diet to Regular, increase Ensure Enlive to 3 bottles/day (1050kcal, 60gm protein) - pt prefers vanilla flavor. Encourage/assist at meal times. Obtain/document weight twice a week. Registered Dietitian Follow-Up     Patient Profile Reviewed                           Yes [x]   No []     Nutrition History Previously Obtained        Yes []  No [x]  pt is confused     Pertinent Interventions: Pt with h/o lymphoma (last chemo was 4 weeks ago) was admitted for weakness, difficulty ambulating and SOB. As per MD SOB is probably multifactorial due to pleural effusion - fluid overload and spread of lymphoma. GOC-  considering hospice, for now DNR, DNI and no PEG placement. ? possible thoracentesis today     Diet order: NPO since midnight, pt has active order for DASH/TLC diet w/ Ensure Enlive q 12hrs      *** 3 day calorie count:   Day 1 (6/2) ~ 45kcal, 0gm protein (breakfast and lunch, dinner not documented)  Day 2 (6/3) ~ 70kcal, 5gm protein (breakfast and lunch, dinner not documented)  Day 3 (6/4) 0kcal, 0 gm protein (breakfast and lunch, dinner not documented)      Anthropometrics:  - Ht. 160.2cm  - Wt. no new weights. 62kg (6/2)  - %wt change  - BMI - 24.2  - IBW - 52.3kg +/-10%.     Pertinent Lab Data: 6/4 BUN- 40, Na 148, ALb 3.4      Pertinent Meds: morphine, lopressor, zestril, protoniox, prednisone, off lasix      Physical Findings:  - Appearance: confused  - GI function: No documented BMs  - Tubes:  - Oral/Mouth cavity:  - Skin: ecchymosis.      Nutrition Requirements  Weight Used: 62kg     Estimated Energy Needs    Continue [x]  Adjust []  1600 - 2000kcal/day based on 1400-1600kcal/d (MSJ x 1.3-1.5) compared to 1860-2170kcal/day(30-35kcal/kg act wt)  Adjusted Energy Recommendations:   kcal/day        Estimated Protein Needs    Continue [x]  Adjust [] 74-87gm/day (1.2-1.4gm/kg act wt)   Adjusted Protein Recommendations:   gm/day        Estimated Fluid Needs        Continue [x]  Adjust []  1ml/1kcal or per LIP   Adjusted Fluid Recommendations:   mL/day     Nutrient Intake: not meeting needs        [x] Previous Nutrition Diagnosis: "Predicted suboptimal energy intake" changed to "Inadequate energy intake"            [x] Ongoing          [] Resolved    [] No active nutrition diagnosis identified at this time     Nutrition Diagnostic #1  Problem:  Etiology:  Statement:     Nutrition Diagnostic #2  Problem:  Etiology:  Statement:     Nutrition Intervention: Meal and snacks. Supplements.      Goal/Expected Outcome: PO intake > 50% meals, snacks and supplements upon f/u in 4 days.     Indicator/Monitoring:  Will monitor energy intake, diet order, labs, body composition, NFPF.     Recommended: please liberalize diet to Regular, increase Ensure Enlive to 3 bottles/day (1050kcal, 60gm protein) - pt prefers vanilla flavor. Encourage/assist at meal times.   As per MD no aggressive nutrition interventions at this time (no PEG/NGT), family is considering hospice care.   Pt remains at risk for now, RD will monitor GOC. Registered Dietitian Follow-Up     Patient Profile Reviewed                           Yes [x]   No []     Nutrition History Previously Obtained        Yes []  No [x]  pt is confused     Pertinent Interventions: Pt with h/o lymphoma (last chemo was 4 weeks ago) was admitted for weakness, difficulty ambulating and SOB. As per MD SOB is probably multifactorial due to pleural effusion - fluid overload and spread of lymphoma. GOC-  considering hospice, for now DNR, DNI and no PEG placement. ? possible thoracentesis today     Diet order: NPO since midnight, pt has active order for DASH/TLC diet w/ Ensure Enlive q 12hrs      *** 3 day calorie count:   Day 1 (6/2) ~ 45kcal, 0gm protein (breakfast and lunch, dinner not documented)  Day 2 (6/3) ~ 70kcal, 5gm protein (breakfast and lunch, dinner not documented)  Day 3 (6/4) 0kcal, 0 gm protein (breakfast and lunch, dinner not documented)      Anthropometrics:  - Ht. 160.2cm  - Wt. no new weights. 62kg (6/2)  - %wt change  - BMI - 24.2  - IBW - 52.3kg +/-10%.     Pertinent Lab Data: 6/4 BUN- 40, Na 148, ALb 3.4      Pertinent Meds: morphine, lopressor, zestril, protoniox, prednisone, off lasix      Physical Findings:  - Appearance: confused  - GI function: No documented BMs  - Tubes:  - Oral/Mouth cavity:  - Skin: ecchymosis.      Nutrition Requirements  Weight Used: 62kg     Estimated Energy Needs    Continue [x]  Adjust []  1600 - 2000kcal/day based on 1400-1600kcal/d (MSJ x 1.3-1.5) compared to 1860-2170kcal/day(30-35kcal/kg act wt)  Adjusted Energy Recommendations:   kcal/day        Estimated Protein Needs    Continue [x]  Adjust [] 74-87gm/day (1.2-1.4gm/kg act wt)   Adjusted Protein Recommendations:   gm/day        Estimated Fluid Needs        Continue [x]  Adjust []  1ml/1kcal or per LIP   Adjusted Fluid Recommendations:   mL/day     Nutrient Intake: not meeting needs        [x] Previous Nutrition Diagnosis: "Predicted suboptimal energy intake" changed to "Inadequate energy intake"            [x] Ongoing          [] Resolved    [] No active nutrition diagnosis identified at this time     Nutrition Diagnostic #1  Problem:  Etiology:  Statement:     Nutrition Diagnostic #2  Problem:  Etiology:  Statement:     Nutrition Intervention: Meal and snacks. Supplements.      Goal/Expected Outcome: PO intake > 50% meals, snacks and supplements upon f/u in 4 days.     Indicator/Monitoring:  Will monitor energy intake, diet order, labs, body composition, NFPF.     Recommended: please liberalize diet to Regular, increase Ensure Enlive to 3 bottles/day (1050kcal, 60gm protein) - pt prefers vanilla flavor. Encourage/assist at meal times.   As per MD no aggressive nutrition interventions at this time (no PEG/NGT), family is considering hospice care. ? Consider appetite stimulant.   Pt remains at risk for now, RD will monitor GOC.

## 2020-06-05 NOTE — PROGRESS NOTE ADULT - SUBJECTIVE AND OBJECTIVE BOX
Patient is comfortable in bed, no complaints       T(F): 96.2 (06-05-20 @ 05:33), Max: 97.1 (06-04-20 @ 21:09)  HR: 86 (06-05-20 @ 05:33)  BP: 111/56 (06-05-20 @ 05:33)  RR: 20 (06-05-20 @ 05:33)  SpO2: 96% (06-05-20 @ 08:20) (96% - 96%)    PHYSICAL EXAM:  GENERAL: NAD  HEAD:  Atraumatic, Normocephalic  NERVOUS SYSTEM:  no focal deficits   CHEST/LUNG:  bilateral rhonchi  HEART: Regular rate and rhythm; No murmurs, rubs, or gallops  ABDOMEN: Soft, Nontender, Nondistended; Bowel sounds present  EXTREMITIES:  2+ Peripheral Pulses, No clubbing, cyanosis, or edema  LYMPH: No lymphadenopathy noted  SKIN: No rashes or lesions    LABS  06-04    148<H>  |  107  |  40<H>  ----------------------------<  86  3.8   |  22  |  1.3    Ca    9.6      04 Jun 2020 05:38  Mg     2.2     06-04    TPro  5.5<L>  /  Alb  3.4<L>  /  TBili  0.6  /  DBili  x   /  AST  26  /  ALT  16  /  AlkPhos  65  06-04                          12.4   9.25  )-----------( 136      ( 05 Jun 2020 06:23 )             38.5     PTT - ( 05 Jun 2020 06:23 )  PTT:95.2 sec    CARDIAC ENZYMES  Creatine Kinase, Serum: 49 (06-03 @ 21:33)  Creatine Kinase, Serum: 61 (06-03 @ 19:00)  Creatine Kinase, Serum: 55 (06-03 @ 15:34)    CKMB Units: 6.1 (06-03 @ 21:33)  CKMB Units: 6.6 (06-03 @ 19:00)  CKMB Units: 7.0 (06-03 @ 15:34)    Troponin T, Serum: 0.05 ng/mL (06-03-20 @ 21:33)  Troponin T, Serum: 0.04 ng/mL (06-03-20 @ 19:00)  Troponin T, Serum: 0.03 ng/mL (06-03-20 @ 15:34)      Culture Results:   No growth to date. (06-02-20)  Culture Results:   <10,000 CFU/mL Normal Urogenital Awilda (06-02-20)  Culture Results:   No Growth Final (05-16-20)  Culture Results:   No Growth Final (05-16-20)    RADIOLOGY  < from: Xray Chest 1 View- PORTABLE-Routine (06.05.20 @ 03:53) >  Impression:      Increased large left pleural effusion and basilar opacity.    Right basilar pleural effusion, unchanged.    < end of copied text >    MEDICATIONS  (STANDING):  chlorhexidine 4% Liquid 1 Application(s) Topical <User Schedule>  heparin  Infusion. 800 Unit(s)/Hr (8 mL/Hr) IV Continuous <Continuous>  lisinopril 10 milliGRAM(s) Oral daily  metoprolol tartrate 50 milliGRAM(s) Oral two times a day  pantoprazole    Tablet 40 milliGRAM(s) Oral before breakfast  predniSONE   Tablet 10 milliGRAM(s) Oral daily    MEDICATIONS  (PRN):  acetaminophen   Tablet .. 650 milliGRAM(s) Oral every 6 hours PRN Temp greater or equal to 38C (100.4F), Mild Pain (1 - 3)  morphine  - Injectable 2 milliGRAM(s) IV Push every 4 hours PRN sob / mild pain / agitation

## 2020-06-05 NOTE — PROGRESS NOTE ADULT - SUBJECTIVE AND OBJECTIVE BOX
Patient is a 80y old  Female who presents with a chief complaint of weakness (04 Jun 2020 09:03)      INTERVAL HISTORY/overnight events  cxtr increase effusion   bed side US positive effusion       Vital Signs Last 24 Hrs  T(C): 35.7 (05 Jun 2020 05:33), Max: 36.2 (04 Jun 2020 21:09)  T(F): 96.2 (05 Jun 2020 05:33), Max: 97.1 (04 Jun 2020 21:09)  HR: 86 (05 Jun 2020 05:33) (86 - 185)  BP: 111/56 (05 Jun 2020 05:33) (99/56 - 136/90)  BP(mean): --  RR: 20 (05 Jun 2020 05:33) (20 - 30)  SpO2: 96% (05 Jun 2020 08:20) (91% - 96%)  Daily     Daily   I&O's Summary    04 Jun 2020 07:01  -  05 Jun 2020 07:00  --------------------------------------------------------  IN: 79 mL / OUT: 0 mL / NET: 79 mL        Physical Examination:    General: No acute distress.  Alert, oriented, interactive, nonfocal    HEENT: Pupils equal, reactive to light.  Symmetric.    PULM: decrease left lower     CVS: Regular rate and rhythm, no murmurs, rubs, or gallops    ABD: Soft, nondistended, nontender, normoactive bowel sounds, no masses    EXT: No edema, nontender    SKIN: Warm and well perfused, no rashes noted.    Neurology:    Musculoskeletal : Move all extremety         Lab Results:                        12.4   9.25  )-----------( 136      ( 05 Jun 2020 06:23 )             38.5     04 Jun 2020 05:38    148    |  107    |  40     ----------------------------<  86     3.8     |  22     |  1.3      Ca    9.6        04 Jun 2020 05:38  Mg     2.2       04 Jun 2020 05:38      PTT - ( 05 Jun 2020 06:23 )  PTT:95.2 sec    CARDIAC MARKERS ( 03 Jun 2020 21:33 )  x     / 0.05 ng/mL / 49 U/L / x     / 6.1 ng/mL  CARDIAC MARKERS ( 03 Jun 2020 19:00 )  x     / 0.04 ng/mL / 61 U/L / x     / 6.6 ng/mL  CARDIAC MARKERS ( 03 Jun 2020 15:34 )  x     / 0.03 ng/mL / 55 U/L / x     / 7.0 ng/mL        Microbiology      Medication:  MEDICATIONS  (STANDING):  chlorhexidine 4% Liquid 1 Application(s) Topical <User Schedule>  heparin  Infusion. 900 Unit(s)/Hr (9 mL/Hr) IV Continuous <Continuous>  lisinopril 10 milliGRAM(s) Oral daily  metoprolol tartrate 50 milliGRAM(s) Oral two times a day  pantoprazole    Tablet 40 milliGRAM(s) Oral before breakfast  predniSONE   Tablet 10 milliGRAM(s) Oral daily    MEDICATIONS  (PRN):  acetaminophen   Tablet .. 650 milliGRAM(s) Oral every 6 hours PRN Temp greater or equal to 38C (100.4F), Mild Pain (1 - 3)  morphine  - Injectable 2 milliGRAM(s) IV Push every 4 hours PRN sob / mild pain / agitation        IMAGING STUDIES:

## 2020-06-05 NOTE — PROGRESS NOTE ADULT - ASSESSMENT
Impression   pleural effusion ?? failure    history of lymphoma with mets   arrythmia   history PE     plan :  heparin been held since 5 am will proceed with thoracentesis after obtaining consent from    resume heprin 2-3 hrs after tap and alexia leija   will send fluid for cytology / flow chemistry , ldh albumin    keep negative balance consider lasix 20  mg once   if any fever start merop and vanco   if effusion persist will do bed side US to evaluate if need tap     keep pox > 92 %   rate control follow cardiology   case discussed with [facility]

## 2020-06-05 NOTE — CHART NOTE - NSCHARTNOTEFT_GEN_A_CORE
Patient is a 80y old  Female who presents with a chief complaint of pleural effusion and diagnosed with SVT on Tele.   S/P Thoracocentesis in am with Dr. Bassett   Holding Heparin since 5am.   Will start heparin around 11:30am, 2 hours post thoracocentesis at a rate of 800 units  Will obtain PTT 6 hours later (5:30)  Pt is DNR/DNI    Vital Signs Last 24 Hrs  T(C): 35.7 (05 Jun 2020 05:33), Max: 36.2 (04 Jun 2020 21:09)  T(F): 96.2 (05 Jun 2020 05:33), Max: 97.1 (04 Jun 2020 21:09)  HR: 86 (05 Jun 2020 05:33) (86 - 185)  RR: 20 (05 Jun 2020 05:33) (20 - 20)  SpO2: 96% (05 Jun 2020 08:20) (96% - 96%)    MEDICATIONS  (STANDING):  chlorhexidine 4% Liquid 1 Application(s) Topical <User Schedule>  heparin  Infusion. 800 Unit(s)/Hr (8 mL/Hr) IV Continuous <Continuous>  lisinopril 10 milliGRAM(s) Oral daily  metoprolol tartrate 50 milliGRAM(s) Oral two times a day  pantoprazole    Tablet 40 milliGRAM(s) Oral before breakfast  predniSONE   Tablet 10 milliGRAM(s) Oral daily    MEDICATIONS  (PRN):  acetaminophen   Tablet .. 650 milliGRAM(s) Oral every 6 hours PRN Temp greater or equal to 38C (100.4F), Mild Pain (1 - 3)  morphine  - Injectable 2 milliGRAM(s) IV Push every 4 hours PRN sob / mild pain / agitation

## 2020-06-05 NOTE — PROGRESS NOTE ADULT - ASSESSMENT
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 At night time her mental status would be  worse. Not ambulating at all, mainly in bed all day.     Last chemo was 4 weeks ago , prior to the last admission.   No longer taking methotrexate for her RA    ON LAST ADMISSION 3 WEEKS AGO :     CTA chest revealed acute bilateral pulmonary emboli within the distal portion of the right main pulmonary artery and proximal left lower lobe arteries. Echocardiogram confirmed severe right heart strain. Patient was admitted to the ICU at [facility]. Patient was evaluated by IR, who deemed that the patient was not a candidate for embolectomy. An IVC filter was placed on 5/18/2020 and the patient was fully anticoagulated. Outpatient MRI head 1 week prior to admission showed no evidence of brain metastases.      pt was  found with svt which resolved after IV Cardizem given, no events overnight   Pt this am is s/p 1 L removed from Left lung via thoracentesis        Lymphoma /DVT & PE s/p IVC filter / SVT / HTN / Hypomagnesemia  / RA / weakness and difficulty ambulating    SOB - probably multifactorial 1) Pleural effusion - fluid overload, 2) spread of lymphoma         - resume  IV Heparin a few hours after thoracentesis and adjust dose based on ptt    -  magnesium levels now normal   - continue metoprolol 50mg q12h   - tele / cardio following    - no antibiotics for now, If spikes fever would start   - ct chest as above   -  urine and blood cultures are negative thus far     - pt/rehab   - continue  prednisone and  Lisinopril    I spoke with pts  regarding pts current condition and poor prognosis  he asks for DNR, DNI and is considering hospice care

## 2020-06-06 LAB
ALBUMIN FLD-MCNC: 1.7 G/DL — SIGNIFICANT CHANGE UP
ALBUMIN SERPL ELPH-MCNC: 2.9 G/DL — LOW (ref 3.5–5.2)
ALP SERPL-CCNC: 62 U/L — SIGNIFICANT CHANGE UP (ref 30–115)
ALT FLD-CCNC: 14 U/L — SIGNIFICANT CHANGE UP (ref 0–41)
ANION GAP SERPL CALC-SCNC: 20 MMOL/L — HIGH (ref 7–14)
APTT BLD: 79.9 SEC — CRITICAL HIGH (ref 27–39.2)
AST SERPL-CCNC: 30 U/L — SIGNIFICANT CHANGE UP (ref 0–41)
BILIRUB SERPL-MCNC: 0.6 MG/DL — SIGNIFICANT CHANGE UP (ref 0.2–1.2)
BUN SERPL-MCNC: 41 MG/DL — HIGH (ref 10–20)
CALCIUM SERPL-MCNC: 9.2 MG/DL — SIGNIFICANT CHANGE UP (ref 8.5–10.1)
CHLORIDE SERPL-SCNC: 106 MMOL/L — SIGNIFICANT CHANGE UP (ref 98–110)
CO2 SERPL-SCNC: 17 MMOL/L — SIGNIFICANT CHANGE UP (ref 17–32)
CORTICOSTEROID BINDING GLOBULIN RESULT: 1.3 MG/DL — LOW
CORTIS F/TOTAL MFR SERPL: 11 % — SIGNIFICANT CHANGE UP
CORTIS SERPL-MCNC: 6.6 UG/DL — SIGNIFICANT CHANGE UP
CORTISOL, FREE RESULT: 0.7 UG/DL — SIGNIFICANT CHANGE UP
CREAT SERPL-MCNC: 1.1 MG/DL — SIGNIFICANT CHANGE UP (ref 0.7–1.5)
GLUCOSE SERPL-MCNC: 80 MG/DL — SIGNIFICANT CHANGE UP (ref 70–99)
HCT VFR BLD CALC: 40.1 % — SIGNIFICANT CHANGE UP (ref 37–47)
HGB BLD-MCNC: 12.6 G/DL — SIGNIFICANT CHANGE UP (ref 12–16)
LDH SERPL L TO P-CCNC: 175 U/L — SIGNIFICANT CHANGE UP
MCHC RBC-ENTMCNC: 31 PG — SIGNIFICANT CHANGE UP (ref 27–31)
MCHC RBC-ENTMCNC: 31.4 G/DL — LOW (ref 32–37)
MCV RBC AUTO: 98.5 FL — SIGNIFICANT CHANGE UP (ref 81–99)
NRBC # BLD: 0 /100 WBCS — SIGNIFICANT CHANGE UP (ref 0–0)
PLATELET # BLD AUTO: 132 K/UL — SIGNIFICANT CHANGE UP (ref 130–400)
POTASSIUM SERPL-MCNC: 3.9 MMOL/L — SIGNIFICANT CHANGE UP (ref 3.5–5)
POTASSIUM SERPL-SCNC: 3.9 MMOL/L — SIGNIFICANT CHANGE UP (ref 3.5–5)
PROT FLD-MCNC: 2.6 G/DL — SIGNIFICANT CHANGE UP
PROT SERPL-MCNC: 4.9 G/DL — LOW (ref 6–8)
RBC # BLD: 4.07 M/UL — LOW (ref 4.2–5.4)
RBC # FLD: 15.5 % — HIGH (ref 11.5–14.5)
SODIUM SERPL-SCNC: 143 MMOL/L — SIGNIFICANT CHANGE UP (ref 135–146)
WBC # BLD: 8.3 K/UL — SIGNIFICANT CHANGE UP (ref 4.8–10.8)
WBC # FLD AUTO: 8.3 K/UL — SIGNIFICANT CHANGE UP (ref 4.8–10.8)

## 2020-06-06 PROCEDURE — 99233 SBSQ HOSP IP/OBS HIGH 50: CPT

## 2020-06-06 PROCEDURE — 71045 X-RAY EXAM CHEST 1 VIEW: CPT | Mod: 26

## 2020-06-06 RX ORDER — SODIUM CHLORIDE 9 MG/ML
500 INJECTION INTRAMUSCULAR; INTRAVENOUS; SUBCUTANEOUS ONCE
Refills: 0 | Status: COMPLETED | OUTPATIENT
Start: 2020-06-06 | End: 2020-06-06

## 2020-06-06 RX ORDER — AMIODARONE HYDROCHLORIDE 400 MG/1
0.5 TABLET ORAL
Qty: 900 | Refills: 0 | Status: DISCONTINUED | OUTPATIENT
Start: 2020-06-06 | End: 2020-06-08

## 2020-06-06 RX ORDER — ENOXAPARIN SODIUM 100 MG/ML
60 INJECTION SUBCUTANEOUS EVERY 12 HOURS
Refills: 0 | Status: COMPLETED | OUTPATIENT
Start: 2020-06-06 | End: 2020-06-07

## 2020-06-06 RX ORDER — DRONABINOL 2.5 MG
5 CAPSULE ORAL
Refills: 0 | Status: DISCONTINUED | OUTPATIENT
Start: 2020-06-06 | End: 2020-06-12

## 2020-06-06 RX ORDER — AMIODARONE HYDROCHLORIDE 400 MG/1
1 TABLET ORAL
Qty: 900 | Refills: 0 | Status: DISCONTINUED | OUTPATIENT
Start: 2020-06-06 | End: 2020-06-08

## 2020-06-06 RX ORDER — MORPHINE SULFATE 50 MG/1
2 CAPSULE, EXTENDED RELEASE ORAL ONCE
Refills: 0 | Status: DISCONTINUED | OUTPATIENT
Start: 2020-06-06 | End: 2020-06-07

## 2020-06-06 RX ORDER — AMIODARONE HYDROCHLORIDE 400 MG/1
150 TABLET ORAL ONCE
Refills: 0 | Status: COMPLETED | OUTPATIENT
Start: 2020-06-06 | End: 2020-06-06

## 2020-06-06 RX ORDER — SODIUM CHLORIDE 9 MG/ML
1000 INJECTION INTRAMUSCULAR; INTRAVENOUS; SUBCUTANEOUS
Refills: 0 | Status: DISCONTINUED | OUTPATIENT
Start: 2020-06-06 | End: 2020-06-07

## 2020-06-06 RX ADMIN — Medication 5 MILLIGRAM(S): at 18:06

## 2020-06-06 RX ADMIN — SODIUM CHLORIDE 500 MILLILITER(S): 9 INJECTION INTRAMUSCULAR; INTRAVENOUS; SUBCUTANEOUS at 17:56

## 2020-06-06 RX ADMIN — AMIODARONE HYDROCHLORIDE 33.3 MG/MIN: 400 TABLET ORAL at 18:41

## 2020-06-06 RX ADMIN — AMIODARONE HYDROCHLORIDE 600 MILLIGRAM(S): 400 TABLET ORAL at 18:24

## 2020-06-06 RX ADMIN — SODIUM CHLORIDE 50 MILLILITER(S): 9 INJECTION INTRAMUSCULAR; INTRAVENOUS; SUBCUTANEOUS at 21:45

## 2020-06-06 RX ADMIN — Medication 10 MILLIGRAM(S): at 05:59

## 2020-06-06 RX ADMIN — SODIUM CHLORIDE 500 MILLILITER(S): 9 INJECTION INTRAMUSCULAR; INTRAVENOUS; SUBCUTANEOUS at 15:20

## 2020-06-06 RX ADMIN — Medication 50 MILLIGRAM(S): at 06:00

## 2020-06-06 RX ADMIN — SODIUM CHLORIDE 500 MILLILITER(S): 9 INJECTION INTRAMUSCULAR; INTRAVENOUS; SUBCUTANEOUS at 21:45

## 2020-06-06 RX ADMIN — LISINOPRIL 10 MILLIGRAM(S): 2.5 TABLET ORAL at 06:00

## 2020-06-06 RX ADMIN — ENOXAPARIN SODIUM 60 MILLIGRAM(S): 100 INJECTION SUBCUTANEOUS at 18:04

## 2020-06-06 RX ADMIN — MORPHINE SULFATE 2 MILLIGRAM(S): 50 CAPSULE, EXTENDED RELEASE ORAL at 16:28

## 2020-06-06 RX ADMIN — SODIUM CHLORIDE 50 MILLILITER(S): 9 INJECTION INTRAMUSCULAR; INTRAVENOUS; SUBCUTANEOUS at 18:06

## 2020-06-06 RX ADMIN — PANTOPRAZOLE SODIUM 40 MILLIGRAM(S): 20 TABLET, DELAYED RELEASE ORAL at 06:00

## 2020-06-06 NOTE — PROGRESS NOTE ADULT - ASSESSMENT
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 Last chemo was 4 prior to the last admission 5/16/20 when CTA chest revealed acute bilateral pulmonary emboli within the distal portion of the right main pulmonary artery and proximal left lower lobe arteries. Echocardiogram confirmed severe right heart strain. Patient was admitted to the ICU at [facility]. Patient was evaluated by IR, who deemed that the patient was not a candidate for embolectomy. An IVC filter was placed on 5/18/2020 and the patient was fully anticoagulated.       Assessment & Plan:    1. Pleural Effusion:  s/p Left Thoracentesis 6/5/20 with 1L of Serous fluid drained.  CXR: Bilateral opacities, decreased on the left, with near-complete resolution of the left pleural effusion.  Exudative. Most likely due to Lymphoma.      2. SVT:  Resolved following Cardizem IV.  Continue Metoprolol.  ECHO: Normal LV Function.      3. Stage 4 Lymphoma:  On Immunotherapy.   Family considering Hospice.      4. DVT & PE s/p IVC filter:  Continue Heparin Drip.  Goal PTT at goal.      5. HTN:  BP controlled on Lisinopril and Lopressor.        6. Magnesium Deficiency:  Replaced. Monitor Level.      7. RA:  On Prednisone.      8. Debility:  Nutritional Support. PT as tolerated.          Prophylaxis: On Heparin drip.  Code status: DNR/DNI    Progress Note Handoff:  Pending consults: None  Pending Tests: None  Family/Patient discussion: Plan of care discussed with Staff.  Disposition: Unknown at this time. Family considering hospice.      Attending: Dr. Breanna Beatty. Spectra [phone]. 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 Last chemo was 4 prior to the last admission 5/16/20 when CTA chest revealed acute bilateral pulmonary emboli within the distal portion of the right main pulmonary artery and proximal left lower lobe arteries. Echocardiogram confirmed severe right heart strain. Patient was admitted to the ICU at [facility]. Patient was evaluated by IR, who deemed that the patient was not a candidate for embolectomy. An IVC filter was placed on 5/18/2020 and the patient was fully anticoagulated.       Assessment & Plan:    1. Pleural Effusion:  s/p Left Thoracentesis 6/5/20 with 1L of Serous fluid drained.  CXR: Bilateral opacities, decreased on the left, with near-complete resolution of the left pleural effusion.  Exudative. Most likely due to Lymphoma.  Follow up Repeat CXR.      2. SVT:  Discussed with cardiologist: Started on Amiodarone drip given borderline low BP.  Continue Metoprolol.  ECHO: Normal LV Function.      3. Stage 4 Lymphoma:  On Immunotherapy.       4. DVT & PE s/p IVC filter:  Continue Heparin Drip.  Goal PTT at goal.      5. HTN:  Borderline low.   Hold Lisinopril. Continue Lopressor for rate control.        6. Magnesium Deficiency:  Replaced. Monitor Level.      7. RA:  On Prednisone.      8. Debility:  Nutritional Support. PT as tolerated.        9. Poor PO intake:   Started on Marinol. Dietician following.   Continue Supplements. Encourage oral intake.  Gentle hydration for 24 hours.      Prophylaxis: On Heparin drip.  Code status: DNR/DNI    Progress Note Handoff:  Pending consults: None  Pending Tests: None  Family/Patient discussion: Plan of care discussed with Staff & Spouse Roman who wants us to do everything we can to help her.  Disposition: Unknown at this time.       Attending: Dr. Breanna Beatty. Spectra [phone]. 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 Last chemo was 4 prior to the last admission 5/16/20 when CTA chest revealed acute bilateral pulmonary emboli within the distal portion of the right main pulmonary artery and proximal left lower lobe arteries. Echocardiogram confirmed severe right heart strain. Patient was admitted to the ICU at [facility]. Patient was evaluated by IR, who deemed that the patient was not a candidate for embolectomy. An IVC filter was placed on 5/18/2020 and the patient was fully anticoagulated.       Assessment & Plan:    1. Pleural Effusion:  s/p Left Thoracentesis 6/5/20 with 1L of Serous fluid drained.  Exudative. Most likely due to Lymphoma.  CXR: Bilateral opacities, decreased on the left, with near-complete resolution of the left pleural effusion.  Follow up Repeat CXR.      2. SVT:  Discussed with cardiologist: Started on Amiodarone drip given borderline low BP.  Continue Metoprolol.  ECHO: Normal LV Function.      3. Stage 4 Lymphoma:  On Immunotherapy.   Pain control.      4. DVT & PE s/p IVC filter:  d/c Heparin Drip. Started on Lovenox.       5. HTN:  Borderline low.   Hold Lisinopril. Continue Lopressor for rate control.      6. Magnesium Deficiency:  Replaced. Monitor Level.      7. RA:  On Prednisone.      8. Debility:  Nutritional Support. PT as tolerated.        9. Poor PO intake:   Started on Marinol. Dietician following.   Continue Supplements. Encourage oral intake.  Gentle IV hydration for 12 hours.      Prophylaxis: Therapeutic Lovenox.  Code status: DNR/DNI    Progress Note Handoff:  Pending consults: None  Pending Tests: None  Family/Patient discussion: Plan of care discussed with Spouse Roman & Son-Law Dr. Felix. They want to continue Limited medical treatment but make her very comfortable too. Not ready for hospice care.   Disposition: Unknown at this time.       Attending: Dr. Breanna Beatty. Spectra [phone].

## 2020-06-06 NOTE — PROGRESS NOTE ADULT - SUBJECTIVE AND OBJECTIVE BOX
CORA BRAMBILA  80y  Female    Patient is a 80y old  Female who presents with a chief complaint of SOB (05 Jun 2020 08:39)      INTERVAL HPI/OVERNIGHT EVENTS:  No interval events.  Patient has no new complaints. Denies any SOB, CP or palpitations.  HR well controlled.      REVIEW OF SYSTEMS:  At least 10 systems were reviewed in ROS.   All systems reviewed are within normal limits.      VITALS:  T(F): 97.2 (06-06-20 @ 06:09), Max: 97.6 (06-05-20 @ 21:10)  HR: 75 (06-06-20 @ 09:24) (75 - 87)  BP: 110/63 (06-06-20 @ 09:24) (94/54 - 125/67)  RR: 20 (06-06-20 @ 06:09) (20 - 20)  SpO2: 93% (06-05-20 @ 12:41) (93% - 93%)      PHYSICAL EXAM:  GENERAL: NAD, frail, well-developed  HEAD:  Atraumatic, Normocephalic  EYES: conjunctiva and sclera clear  ENMT: Moist mucous membranes  NECK: Supple, Normal thyroid  NERVOUS SYSTEM:  Awake & Alert but lethargic.    CHEST/LUNG: Rhonchi bilaterally; No rales, rhonchi, wheezing, or rubs  HEART: Regular rate and rhythm; No murmurs, rubs, or gallops  ABDOMEN: Soft, Nontender, Nondistended; Bowel sounds present  EXTREMITIES:  2+ Peripheral Pulses, No clubbing, cyanosis, or edema  LYMPH: No lymphadenopathy noted  SKIN: No rashes or lesions    Consultant(s) Notes Reviewed:  [x ] YES  [ ] NO  Care Discussed with Consultants/Other Providers [ x] YES  [ ] NO    LABS:                        12.6   8.30  )-----------( 132      ( 06 Jun 2020 05:54 )             40.1     06-06    143  |  106  |  41<H>  ----------------------------<  80  3.9   |  17  |  1.1    Ca    9.2      06 Jun 2020 05:54    TPro  4.9<L>  /  Alb  2.9<L>  /  TBili  0.6  /  DBili  x   /  AST  30  /  ALT  14  /  AlkPhos  62  06-06      MICROBIOLOGY:  Culture - Body Fluid with Gram Stain (06.05.20 @ 09:20)    Gram Stain:   polymorphonuclear leukocytes seen  No organisms seen  by cytocentrifuge    Specimen Source: Pleural Fl Pleural Fluid      Culture - Blood in AM (06.02.20 @ 06:46)    Specimen Source: .Blood None    Culture Results:   No growth to date.      Culture - Urine (06.02.20 @ 05:50)    Specimen Source: .Urine Clean Catch (Midstream)    Culture Results:   <10,000 CFU/mL Normal Urogenital Awilda      RADIOLOGY & ADDITIONAL TESTS:  CT Chest No Cont (06.03.20 @ 12:00)   1. Extensive left axillary, supraclavicular and intrathoracic lymphadenopathy compatible with the patient's history of lymphoma.    2. Interlobular septal thickening and ground glass opacities, findings may represent underlying lymphangitic spread of tumor versus pulmonary edema. Correlation with fluid analysis is recommended.    3. Increased moderate to large bilateral pleural effusions with adjacent atelectasis.      CT Head No Cont (06.01.20 @ 15:54)   Unremarkable study.      X-ray Chest 1 View-PORTABLE IMMEDIATE (06.05.20 @ 10:18)   Bilateral opacities, decreased on the left, with near-complete resolution of the left pleural effusion.        Imaging Personally Reviewed:  [x] YES  [ ] NO      MEDICATIONS  (STANDING):  chlorhexidine 4% Liquid 1 Application(s) Topical <User Schedule>  heparin  Infusion. 800 Unit(s)/Hr (8 mL/Hr) IV Continuous <Continuous>  lisinopril 10 milliGRAM(s) Oral daily  metoprolol tartrate 50 milliGRAM(s) Oral two times a day  pantoprazole    Tablet 40 milliGRAM(s) Oral before breakfast  predniSONE   Tablet 10 milliGRAM(s) Oral daily      MEDICATIONS  (PRN):  acetaminophen   Tablet .. 650 milliGRAM(s) Oral every 6 hours PRN Temp greater or equal to 38C (100.4F), Mild Pain (1 - 3)  morphine  - Injectable 2 milliGRAM(s) IV Push every 4 hours PRN sob / mild pain / agitation        Home Medications:  lisinopril 20 mg oral tablet: 1 tab(s) orally once a day (19 May 2020 10:29)  metoprolol succinate 50 mg oral tablet, extended release: 1 tab(s) orally once a day (19 May 2020 10:29)  predniSONE 10 mg oral tablet: 1 tab(s) orally once a day (19 May 2020 10:29)  	      HEALTH ISSUES - PROBLEM Dx:  Pulmonary thromboembolism  Pleural effusion.  HTN (hypertension)  Rheumatoid arthritis  Lymphoma: mets to lungs, liver and bone  H/O total knee replacement  History of hip replacement CORA BRAMBILA  80y  Female    Patient is a 80y old  Female who presents with a chief complaint of SOB (05 Jun 2020 08:39)      INTERVAL HPI/OVERNIGHT EVENTS:  No interval events.  Patient has no new complaints. Denies any SOB, CP or palpitations.  Still with poor PO intake and minimal urine out put.  Runs of SVT which spontaneously resolves.       REVIEW OF SYSTEMS:  At least 10 systems were reviewed in ROS.   All systems reviewed are within normal limits.      VITALS:  T(F): 97.2 (06-06-20 @ 06:09), Max: 97.6 (06-05-20 @ 21:10)  HR: 75 (06-06-20 @ 09:24) (75 - 87)  BP: 110/63 (06-06-20 @ 09:24) (94/54 - 125/67)  RR: 20 (06-06-20 @ 06:09) (20 - 20)  SpO2: 93% (06-05-20 @ 12:41) (93% - 93%)      PHYSICAL EXAM:  GENERAL: NAD, frail, well-developed  HEAD:  Atraumatic, Normocephalic  EYES: conjunctiva and sclera clear  ENMT: Moist mucous membranes  NECK: Supple, Normal thyroid  NERVOUS SYSTEM:  Awake & Alert but lethargic.    CHEST/LUNG: Rhonchi bilaterally; No rales, rhonchi, wheezing, or rubs  HEART: Regular rate and rhythm; No murmurs, rubs, or gallops  ABDOMEN: Soft, Nontender, Nondistended; Bowel sounds present  EXTREMITIES:  2+ Peripheral Pulses, No clubbing, cyanosis, or edema  LYMPH: No lymphadenopathy noted  SKIN: No rashes or lesions    Consultant(s) Notes Reviewed:  [x ] YES  [ ] NO  Care Discussed with Consultants/Other Providers [ x] YES  [ ] NO    LABS:                        12.6   8.30  )-----------( 132      ( 06 Jun 2020 05:54 )             40.1     06-06    143  |  106  |  41<H>  ----------------------------<  80  3.9   |  17  |  1.1    Ca    9.2      06 Jun 2020 05:54    TPro  4.9<L>  /  Alb  2.9<L>  /  TBili  0.6  /  DBili  x   /  AST  30  /  ALT  14  /  AlkPhos  62  06-06      MICROBIOLOGY:  Culture - Body Fluid with Gram Stain (06.05.20 @ 09:20)    Gram Stain:   polymorphonuclear leukocytes seen  No organisms seen  by cytocentrifuge    Specimen Source: Pleural Fl Pleural Fluid      Culture - Blood in AM (06.02.20 @ 06:46)    Specimen Source: .Blood None    Culture Results:   No growth to date.      Culture - Urine (06.02.20 @ 05:50)    Specimen Source: .Urine Clean Catch (Midstream)    Culture Results:   <10,000 CFU/mL Normal Urogenital Awilda      RADIOLOGY & ADDITIONAL TESTS:  CT Chest No Cont (06.03.20 @ 12:00)   1. Extensive left axillary, supraclavicular and intrathoracic lymphadenopathy compatible with the patient's history of lymphoma.    2. Interlobular septal thickening and ground glass opacities, findings may represent underlying lymphangitic spread of tumor versus pulmonary edema. Correlation with fluid analysis is recommended.    3. Increased moderate to large bilateral pleural effusions with adjacent atelectasis.      CT Head No Cont (06.01.20 @ 15:54)   Unremarkable study.      X-ray Chest 1 View-PORTABLE IMMEDIATE (06.05.20 @ 10:18)   Bilateral opacities, decreased on the left, with near-complete resolution of the left pleural effusion.        Imaging Personally Reviewed:  [x] YES  [ ] NO      MEDICATIONS  (STANDING):  chlorhexidine 4% Liquid 1 Application(s) Topical <User Schedule>  heparin  Infusion. 800 Unit(s)/Hr (8 mL/Hr) IV Continuous <Continuous>  lisinopril 10 milliGRAM(s) Oral daily  metoprolol tartrate 50 milliGRAM(s) Oral two times a day  pantoprazole    Tablet 40 milliGRAM(s) Oral before breakfast  predniSONE   Tablet 10 milliGRAM(s) Oral daily      MEDICATIONS  (PRN):  acetaminophen   Tablet .. 650 milliGRAM(s) Oral every 6 hours PRN Temp greater or equal to 38C (100.4F), Mild Pain (1 - 3)  morphine  - Injectable 2 milliGRAM(s) IV Push every 4 hours PRN sob / mild pain / agitation        Home Medications:  lisinopril 20 mg oral tablet: 1 tab(s) orally once a day (19 May 2020 10:29)  metoprolol succinate 50 mg oral tablet, extended release: 1 tab(s) orally once a day (19 May 2020 10:29)  predniSONE 10 mg oral tablet: 1 tab(s) orally once a day (19 May 2020 10:29)  	      HEALTH ISSUES - PROBLEM Dx:  Pulmonary thromboembolism  Pleural effusion.  HTN (hypertension)  Rheumatoid arthritis  Lymphoma: mets to lungs, liver and bone  H/O total knee replacement  History of hip replacement CORA BRAMBILA  80y  Female    Patient is a 80y old  Female who presents with a chief complaint of SOB (05 Jun 2020 08:39)      INTERVAL HPI/OVERNIGHT EVENTS:  No interval events.  Patient has no new complaints. Denies any SOB, CP or palpitations.  Still with poor PO intake and minimal urine out put.  Runs of SVT which spontaneously resolves & recurrent Chest pain.      REVIEW OF SYSTEMS: UTO due to AMS.      VITALS:  T(F): 97.2 (06-06-20 @ 06:09), Max: 97.6 (06-05-20 @ 21:10)  HR: 75 (06-06-20 @ 09:24) (75 - 87)  BP: 110/63 (06-06-20 @ 09:24) (94/54 - 125/67)  RR: 20 (06-06-20 @ 06:09) (20 - 20)  SpO2: 93% (06-05-20 @ 12:41) (93% - 93%)      PHYSICAL EXAM:  GENERAL: NAD, frail, well-developed  HEAD:  Atraumatic, Normocephalic  EYES: conjunctiva and sclera clear  ENMT: Moist mucous membranes  NECK: Supple, Normal thyroid  NERVOUS SYSTEM:  Awake & Alert but lethargic. Oriented 1-2.  CHEST/LUNG: Rhonchi bilaterally; No rales, rhonchi, wheezing, or rubs  HEART: Regular rate and rhythm; No murmurs, rubs, or gallops  ABDOMEN: Soft, Nontender, Nondistended; Bowel sounds present  EXTREMITIES:  2+ Peripheral Pulses, No clubbing, cyanosis, or edema  LYMPH: No lymphadenopathy noted  SKIN: No rashes or lesions    Consultant(s) Notes Reviewed:  [x ] YES  [ ] NO  Care Discussed with Consultants/Other Providers [ x] YES  [ ] NO    LABS:                        12.6   8.30  )-----------( 132      ( 06 Jun 2020 05:54 )             40.1     06-06    143  |  106  |  41<H>  ----------------------------<  80  3.9   |  17  |  1.1    Ca    9.2      06 Jun 2020 05:54    TPro  4.9<L>  /  Alb  2.9<L>  /  TBili  0.6  /  DBili  x   /  AST  30  /  ALT  14  /  AlkPhos  62  06-06      MICROBIOLOGY:  Culture - Body Fluid with Gram Stain (06.05.20 @ 09:20)    Gram Stain:   polymorphonuclear leukocytes seen  No organisms seen  by cytocentrifuge    Specimen Source: Pleural Fl Pleural Fluid      Culture - Blood in AM (06.02.20 @ 06:46)    Specimen Source: .Blood None    Culture Results:   No growth to date.      Culture - Urine (06.02.20 @ 05:50)    Specimen Source: .Urine Clean Catch (Midstream)    Culture Results:   <10,000 CFU/mL Normal Urogenital Awilda      RADIOLOGY & ADDITIONAL TESTS:  CT Chest No Cont (06.03.20 @ 12:00)   1. Extensive left axillary, supraclavicular and intrathoracic lymphadenopathy compatible with the patient's history of lymphoma.    2. Interlobular septal thickening and ground glass opacities, findings may represent underlying lymphangitic spread of tumor versus pulmonary edema. Correlation with fluid analysis is recommended.    3. Increased moderate to large bilateral pleural effusions with adjacent atelectasis.      CT Head No Cont (06.01.20 @ 15:54)   Unremarkable study.      X-ray Chest 1 View-PORTABLE IMMEDIATE (06.05.20 @ 10:18)   Bilateral opacities, decreased on the left, with near-complete resolution of the left pleural effusion.        Imaging Personally Reviewed:  [x] YES  [ ] NO      MEDICATIONS  (STANDING):  chlorhexidine 4% Liquid 1 Application(s) Topical <User Schedule>  heparin  Infusion. 800 Unit(s)/Hr (8 mL/Hr) IV Continuous <Continuous>  lisinopril 10 milliGRAM(s) Oral daily  metoprolol tartrate 50 milliGRAM(s) Oral two times a day  pantoprazole    Tablet 40 milliGRAM(s) Oral before breakfast  predniSONE   Tablet 10 milliGRAM(s) Oral daily      MEDICATIONS  (PRN):  acetaminophen   Tablet .. 650 milliGRAM(s) Oral every 6 hours PRN Temp greater or equal to 38C (100.4F), Mild Pain (1 - 3)  morphine  - Injectable 2 milliGRAM(s) IV Push every 4 hours PRN sob / mild pain / agitation        Home Medications:  lisinopril 20 mg oral tablet: 1 tab(s) orally once a day (19 May 2020 10:29)  metoprolol succinate 50 mg oral tablet, extended release: 1 tab(s) orally once a day (19 May 2020 10:29)  predniSONE 10 mg oral tablet: 1 tab(s) orally once a day (19 May 2020 10:29)  	      HEALTH ISSUES - PROBLEM Dx:  Pulmonary thromboembolism  Pleural effusion.  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

## 2020-06-06 NOTE — PHYSICAL THERAPY INITIAL EVALUATION ADULT - PATIENT PROFILE REVIEW, REHAB EVAL
SERTRALINE     Last Written Prescription Date: 8/3/16  Last Fill Quantity: 180, # refills: 1  Last Office Visit with [facility] [place] primary care provider:  12/16/16        Last PHQ-9 score on record=   PHQ-9 SCORE 10/7/2016   Total Score -   Total Score 4                    yes

## 2020-06-06 NOTE — PHYSICAL THERAPY INITIAL EVALUATION ADULT - SPECIFY REASON(S)
Patient is requesting to defer PT evaluation at this time. Would like to rest today, however, says she would like to get OOB with PT tomorrow if able. PT will follow up as appropriate. RN aware.

## 2020-06-07 LAB
ALBUMIN SERPL ELPH-MCNC: 2.8 G/DL — LOW (ref 3.5–5.2)
ALP SERPL-CCNC: 58 U/L — SIGNIFICANT CHANGE UP (ref 30–115)
ALT FLD-CCNC: 12 U/L — SIGNIFICANT CHANGE UP (ref 0–41)
ANION GAP SERPL CALC-SCNC: 16 MMOL/L — HIGH (ref 7–14)
AST SERPL-CCNC: 26 U/L — SIGNIFICANT CHANGE UP (ref 0–41)
BILIRUB SERPL-MCNC: 0.5 MG/DL — SIGNIFICANT CHANGE UP (ref 0.2–1.2)
BUN SERPL-MCNC: 39 MG/DL — HIGH (ref 10–20)
CALCIUM SERPL-MCNC: 8.8 MG/DL — SIGNIFICANT CHANGE UP (ref 8.5–10.1)
CHLORIDE SERPL-SCNC: 107 MMOL/L — SIGNIFICANT CHANGE UP (ref 98–110)
CO2 SERPL-SCNC: 18 MMOL/L — SIGNIFICANT CHANGE UP (ref 17–32)
CREAT SERPL-MCNC: 0.9 MG/DL — SIGNIFICANT CHANGE UP (ref 0.7–1.5)
CULTURE RESULTS: SIGNIFICANT CHANGE UP
GLUCOSE SERPL-MCNC: 91 MG/DL — SIGNIFICANT CHANGE UP (ref 70–99)
HCT VFR BLD CALC: 39.2 % — SIGNIFICANT CHANGE UP (ref 37–47)
HGB BLD-MCNC: 12.2 G/DL — SIGNIFICANT CHANGE UP (ref 12–16)
MCHC RBC-ENTMCNC: 31.1 G/DL — LOW (ref 32–37)
MCHC RBC-ENTMCNC: 31.2 PG — HIGH (ref 27–31)
MCV RBC AUTO: 100.3 FL — HIGH (ref 81–99)
NRBC # BLD: 0 /100 WBCS — SIGNIFICANT CHANGE UP (ref 0–0)
PLATELET # BLD AUTO: 126 K/UL — LOW (ref 130–400)
POTASSIUM SERPL-MCNC: 3.8 MMOL/L — SIGNIFICANT CHANGE UP (ref 3.5–5)
POTASSIUM SERPL-SCNC: 3.8 MMOL/L — SIGNIFICANT CHANGE UP (ref 3.5–5)
PROT SERPL-MCNC: 4.5 G/DL — LOW (ref 6–8)
RBC # BLD: 3.91 M/UL — LOW (ref 4.2–5.4)
RBC # FLD: 15.7 % — HIGH (ref 11.5–14.5)
SODIUM SERPL-SCNC: 141 MMOL/L — SIGNIFICANT CHANGE UP (ref 135–146)
SPECIMEN SOURCE: SIGNIFICANT CHANGE UP
WBC # BLD: 6.08 K/UL — SIGNIFICANT CHANGE UP (ref 4.8–10.8)
WBC # FLD AUTO: 6.08 K/UL — SIGNIFICANT CHANGE UP (ref 4.8–10.8)

## 2020-06-07 PROCEDURE — 99233 SBSQ HOSP IP/OBS HIGH 50: CPT

## 2020-06-07 RX ORDER — SODIUM CHLORIDE 9 MG/ML
500 INJECTION INTRAMUSCULAR; INTRAVENOUS; SUBCUTANEOUS ONCE
Refills: 0 | Status: COMPLETED | OUTPATIENT
Start: 2020-06-07 | End: 2020-06-07

## 2020-06-07 RX ORDER — SODIUM CHLORIDE 9 MG/ML
1000 INJECTION INTRAMUSCULAR; INTRAVENOUS; SUBCUTANEOUS
Refills: 0 | Status: DISCONTINUED | OUTPATIENT
Start: 2020-06-07 | End: 2020-06-07

## 2020-06-07 RX ORDER — SODIUM CHLORIDE 9 MG/ML
1000 INJECTION INTRAMUSCULAR; INTRAVENOUS; SUBCUTANEOUS
Refills: 0 | Status: DISCONTINUED | OUTPATIENT
Start: 2020-06-07 | End: 2020-06-08

## 2020-06-07 RX ORDER — APIXABAN 2.5 MG/1
5 TABLET, FILM COATED ORAL
Refills: 0 | Status: DISCONTINUED | OUTPATIENT
Start: 2020-06-08 | End: 2020-06-08

## 2020-06-07 RX ORDER — AMIODARONE HYDROCHLORIDE 400 MG/1
200 TABLET ORAL DAILY
Refills: 0 | Status: DISCONTINUED | OUTPATIENT
Start: 2020-06-08 | End: 2020-06-12

## 2020-06-07 RX ADMIN — SODIUM CHLORIDE 75 MILLILITER(S): 9 INJECTION INTRAMUSCULAR; INTRAVENOUS; SUBCUTANEOUS at 18:19

## 2020-06-07 RX ADMIN — SODIUM CHLORIDE 500 MILLILITER(S): 9 INJECTION INTRAMUSCULAR; INTRAVENOUS; SUBCUTANEOUS at 21:30

## 2020-06-07 RX ADMIN — Medication 50 MILLIGRAM(S): at 06:01

## 2020-06-07 RX ADMIN — Medication 50 MILLIGRAM(S): at 17:03

## 2020-06-07 RX ADMIN — ENOXAPARIN SODIUM 60 MILLIGRAM(S): 100 INJECTION SUBCUTANEOUS at 17:03

## 2020-06-07 RX ADMIN — AMIODARONE HYDROCHLORIDE 16.7 MG/MIN: 400 TABLET ORAL at 00:06

## 2020-06-07 RX ADMIN — Medication 5 MILLIGRAM(S): at 06:01

## 2020-06-07 RX ADMIN — CHLORHEXIDINE GLUCONATE 1 APPLICATION(S): 213 SOLUTION TOPICAL at 06:02

## 2020-06-07 RX ADMIN — PANTOPRAZOLE SODIUM 40 MILLIGRAM(S): 20 TABLET, DELAYED RELEASE ORAL at 06:01

## 2020-06-07 RX ADMIN — SODIUM CHLORIDE 50 MILLILITER(S): 9 INJECTION INTRAMUSCULAR; INTRAVENOUS; SUBCUTANEOUS at 10:04

## 2020-06-07 RX ADMIN — SODIUM CHLORIDE 1000 MILLILITER(S): 9 INJECTION INTRAMUSCULAR; INTRAVENOUS; SUBCUTANEOUS at 17:23

## 2020-06-07 RX ADMIN — Medication 10 MILLIGRAM(S): at 06:01

## 2020-06-07 RX ADMIN — Medication 5 MILLIGRAM(S): at 16:27

## 2020-06-07 RX ADMIN — ENOXAPARIN SODIUM 60 MILLIGRAM(S): 100 INJECTION SUBCUTANEOUS at 06:01

## 2020-06-07 NOTE — PHYSICAL THERAPY INITIAL EVALUATION ADULT - GAIT DEVIATIONS NOTED, PT EVAL
decreased jake/decreased step length/trunk, hips, and knees flexed, dec DAYAMI, incomplete foot clearance/decreased weight-shifting ability

## 2020-06-07 NOTE — PHYSICAL THERAPY INITIAL EVALUATION ADULT - GENERAL OBSERVATIONS, REHAB EVAL
As discussed with KHALIDA Coley, will hold PT eval at this time as patient just s/p thoracocentesis. Will follow up and discuss with MD further need to follow up if patient continues to be not medically appropriate for PT.
10:35-11:00 Chart reviewed. Pt encountered semireclined in bed, may be seen by Physical Therapist as confirmed with Nurse and requested by MD Beatty. Patient denied pain at rest and agreed to try to get up; +tele; +O2 via NC; +IV BUE; +Primafit
As discussed with ANITA To, hold pt for PT 2* not medically stable/appropriate for PT at this time. PT to follow up tomorrow.

## 2020-06-07 NOTE — PROGRESS NOTE ADULT - ASSESSMENT
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 [place]) and HTN brought in by Family with complaints of increasing confusion for the 3 days worse at night time & difficulty ambulating. Last chemo was 4 prior to the last admission 5/16/20 when CTA chest revealed acute bilateral pulmonary emboli within the distal portion of the right main pulmonary artery and proximal left lower lobe arteries. Echocardiogram confirmed severe right heart strain. Patient was admitted to the ICU at [facility]. Patient was evaluated by IR, who deemed that the patient was not a candidate for embolectomy. An IVC filter was placed on 5/18/2020 and the patient was fully anticoagulated.       Assessment & Plan:    1. Pleural Effusion:  s/p Left Thoracentesis 6/5/20 with 1L of Serous fluid drained.  Exudative. Most likely due to Lymphoma.  CXR: Bilateral opacities, decreased on the left, with near-complete resolution of the left pleural effusion.  Follow up Cytology.      2. SVT: Resolved.  Cardiology following: Complete Amiodarone drip & Start PO in the AM.   Continue Metoprolol.  ECHO: Normal LV Function.      3. Stage 4 Lymphoma:  On Immunotherapy.   Pain control.      4. DVT & PE s/p IVC filter:  d/c Heparin Drip. Started on Lovenox.       5. HTN:  Borderline low.   Hold Lisinopril. Continue Lopressor for rate control.      6. Magnesium Deficiency:  Replaced. Monitor Level.      7. RA:  On Prednisone.      8. Debility:  Nutritional Support. PT as tolerated.        9. Poor PO intake:   Started on Marinol. Dietician following.   Continue Supplements. Encourage oral intake.  Gentle IV hydration.         Prophylaxis: Therapeutic Lovenox.  Code status: DNR/DNI    Progress Note Handoff:  Pending consults: None  Pending Tests: None  Family/Patient discussion: Plan of care discussed with Spouse Roman & Son-Law Dr. Felix . They want to continue Limited medical treatment but make her very comfortable too. Not ready for hospice care. They will discuss it with the oncologist at [facility] tomorrow and make a decision.   Disposition: Unknown at this time.       Attending: Dr. Breanna Beatty. Spectra [phone].

## 2020-06-07 NOTE — PROGRESS NOTE ADULT - SUBJECTIVE AND OBJECTIVE BOX
Patient is a 80y old  Female who presents with a chief complaint of SOB (06 Jun 2020 11:03)      T(F): 97.1 (06-07-20 @ 06:04), Max: 98.7 (06-06-20 @ 14:07)  HR: 79 (06-07-20 @ 06:04)  BP: 104/55 (06-07-20 @ 06:04)  RR: 18 (06-07-20 @ 06:04)  SpO2: --    PHYSICAL EXAM:  GENERAL: NAD, well-groomed, well-developed  HEAD:  Atraumatic, Normocephalic  EYES: EOMI, PERRLA, conjunctiva and sclera clear  ENMT: No tonsillar erythema, exudates, or enlargement; Moist mucous membranes, Good dentition, No lesions  NECK: Supple, No JVD, Normal thyroid  NERVOUS SYSTEM:  Alert & Oriented X3,  Motor Strength 5/5 B/L upper and lower extremities  CHEST/LUNG: Clear to percussion bilaterally; No rales, rhonchi, wheezing, or rubs  HEART: Regular rate and rhythm; No murmurs, rubs, or gallops  ABDOMEN: Soft, Nontender, Nondistended; Bowel sounds present  EXTREMITIES:   No clubbing, cyanosis, or edema  LYMPH: No lymphadenopathy noted  SKIN: No rashes or lesions    labs  06-07    141  |  107  |  39<H>  ----------------------------<  91  3.8   |  18  |  0.9    Ca    8.8      07 Jun 2020 06:53    TPro  4.5<L>  /  Alb  2.8<L>  /  TBili  0.5  /  DBili  x   /  AST  26  /  ALT  12  /  AlkPhos  58  06-07                          12.2   6.08  )-----------( 126      ( 07 Jun 2020 06:53 )             39.2       Culture - Body Fluid with Gram Stain (collected 05 Jun 2020 09:20)  Source: Pleural Fl Pleural Fluid  Gram Stain (05 Jun 2020 23:45):    polymorphonuclear leukocytes seen    No organisms seen    by cytocentrifuge  Preliminary Report (06 Jun 2020 15:21):    No growth      PTT - ( 06 Jun 2020 05:54 )  PTT:79.9 sec        acetaminophen   Tablet .. 650 milliGRAM(s) Oral every 6 hours PRN  aMIOdarone Infusion 1 mG/Min IV Continuous <Continuous>  aMIOdarone Infusion 0.5 mG/Min IV Continuous <Continuous>  chlorhexidine 4% Liquid 1 Application(s) Topical <User Schedule>  dronabinol 5 milliGRAM(s) Oral three times a day before meals  enoxaparin Injectable 60 milliGRAM(s) SubCutaneous every 12 hours  metoprolol tartrate 50 milliGRAM(s) Oral two times a day  morphine  - Injectable 2 milliGRAM(s) IV Push every 4 hours PRN  pantoprazole    Tablet 40 milliGRAM(s) Oral before breakfast  predniSONE   Tablet 10 milliGRAM(s) Oral daily

## 2020-06-07 NOTE — PROGRESS NOTE ADULT - SUBJECTIVE AND OBJECTIVE BOX
CORA BRAMBILA  80y  Female    Patient is a 80y old Female who presents with a chief complaint of SOB (05 Jun 2020 08:39)      INTERVAL HPI/OVERNIGHT EVENTS:  No interval events.  Patient has no new complaints. Denies any SOB, CP or palpitations.  Still with poor PO intake and minimal urine out put.  No further runs of SVT after starting IV Amiodarone.   Chest pain resolved.      REVIEW OF SYSTEMS: UTO due to AMS.      VITALS:  T(C): 36.2 (07 Jun 2020 06:04), Max: 37.1 (06 Jun 2020 14:07)  T(F): 97.1 (07 Jun 2020 06:04), Max: 98.7 (06 Jun 2020 14:07)  HR: 79 (07 Jun 2020 06:04) (73 - 98)  BP: 104/55 (07 Jun 2020 06:04) (87/51 - 118/56)  BP(mean): --  RR: 18 (07 Jun 2020 06:04) (18 - 20)  SpO2: --      PHYSICAL EXAM:  GENERAL: NAD, frail, well-developed  HEAD:  Atraumatic, Normocephalic  EYES: conjunctiva and sclera clear  ENMT: Moist mucous membranes  NECK: Supple, Normal thyroid  NERVOUS SYSTEM:  Awake & Alert but lethargic. Oriented 1-2.  CHEST/LUNG: Rhonchi bilaterally; No rales, rhonchi, wheezing, or rubs  HEART: Regular rate and rhythm; No murmurs, rubs, or gallops  ABDOMEN: Soft, Nontender, Nondistended; Bowel sounds present  EXTREMITIES:  2+ Peripheral Pulses, No clubbing, cyanosis, or edema  LYMPH: No lymphadenopathy noted  SKIN: No rashes or lesions    Consultant(s) Notes Reviewed:  [x ] YES  [ ] NO  Care Discussed with Consultants/Other Providers [ x] YES  [ ] NO    LABS:                                  12.2   6.08  )-----------( 126      ( 07 Jun 2020 06:53 )             39.2       06-07    141  |  107  |  39<H>  ----------------------------<  91  3.8   |  18  |  0.9    Ca    8.8      07 Jun 2020 06:53    TPro  4.5<L>  /  Alb  2.8<L>  /  TBili  0.5  /  DBili  x   /  AST  26  /  ALT  12  /  AlkPhos  58  06-07      MICROBIOLOGY:  Culture - Body Fluid with Gram Stain (06.05.20 @ 09:20)    Gram Stain:   polymorphonuclear leukocytes seen  No organisms seen  by cytocentrifuge    Specimen Source: Pleural Fl Pleural Fluid      Culture - Blood in AM (06.02.20 @ 06:46)    Specimen Source: .Blood None    Culture Results:   No growth to date.      Culture - Urine (06.02.20 @ 05:50)    Specimen Source: .Urine Clean Catch (Midstream)    Culture Results:   <10,000 CFU/mL Normal Urogenital Awilda      RADIOLOGY & ADDITIONAL TESTS:  CT Chest No Cont (06.03.20 @ 12:00)   1. Extensive left axillary, supraclavicular and intrathoracic lymphadenopathy compatible with the patient's history of lymphoma.    2. Interlobular septal thickening and ground glass opacities, findings may represent underlying lymphangitic spread of tumor versus pulmonary edema. Correlation with fluid analysis is recommended.    3. Increased moderate to large bilateral pleural effusions with adjacent atelectasis.      CT Head No Cont (06.01.20 @ 15:54)   Unremarkable study.      X-ray Chest 1 View-PORTABLE IMMEDIATE (06.05.20 @ 10:18)   Bilateral opacities, decreased on the left, with near-complete resolution of the left pleural effusion.      Xray Chest 1 View- PORTABLE-Urgent (06.06.20 @ 17:40)   Bilateral opacities unchanged. No pleural effusion or air leak      Imaging Personally Reviewed:  [x] YES  [ ] NO      MEDICATIONS  (STANDING):  aMIOdarone Infusion 1 mG/Min (33.3 mL/Hr) IV Continuous <Continuous>  aMIOdarone Infusion 0.5 mG/Min (16.7 mL/Hr) IV Continuous <Continuous>  chlorhexidine 4% Liquid 1 Application(s) Topical <User Schedule>  dronabinol 5 milliGRAM(s) Oral three times a day before meals  enoxaparin Injectable 60 milliGRAM(s) SubCutaneous every 12 hours  metoprolol tartrate 50 milliGRAM(s) Oral two times a day  pantoprazole    Tablet 40 milliGRAM(s) Oral before breakfast  predniSONE   Tablet 10 milliGRAM(s) Oral daily      MEDICATIONS  (PRN):  acetaminophen   Tablet .. 650 milliGRAM(s) Oral every 6 hours PRN Temp greater or equal to 38C (100.4F), Mild Pain (1 - 3)  morphine  - Injectable 2 milliGRAM(s) IV Push every 4 hours PRN sob / mild pain / agitation        Home Medications:  lisinopril 20 mg oral tablet: 1 tab(s) orally once a day (19 May 2020 10:29)  metoprolol succinate 50 mg oral tablet, extended release: 1 tab(s) orally once a day (19 May 2020 10:29)  predniSONE 10 mg oral tablet: 1 tab(s) orally once a day (19 May 2020 10:29)  	      HEALTH ISSUES - PROBLEM Dx:  Pulmonary thromboembolism  Pleural effusion.  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

## 2020-06-07 NOTE — PHYSICAL THERAPY INITIAL EVALUATION ADULT - PERTINENT HX OF CURRENT PROBLEM, REHAB EVAL
Admitted for Lymphoma, Altered mental status, Failure to thrive in adult; s/p (L)  Thoracocentesis 6/5/20

## 2020-06-07 NOTE — PHYSICAL THERAPY INITIAL EVALUATION ADULT - IMPAIRED TRANSFERS: SIT/STAND, REHAB EVAL
decreased endurance/narrow base of support/impaired postural control/decreased strength/impaired balance

## 2020-06-07 NOTE — PHYSICAL THERAPY INITIAL EVALUATION ADULT - IMPAIRMENTS CONTRIBUTING TO GAIT DEVIATIONS, PT EVAL
impaired balance/decreased endurance/impaired postural control/narrow base of support/decreased strength

## 2020-06-07 NOTE — PROGRESS NOTE ADULT - ASSESSMENT
Patient lying flat. No complaints. Echo lv function normal. Appreciate pulmonary. Watch lytes. Resume AC PO  when able. She had further svt yesterday. Better on IV amiodarone. Complete drip . Amiodarone 200 qd in am. Prognosis guarded

## 2020-06-07 NOTE — PHYSICAL THERAPY INITIAL EVALUATION ADULT - TRANSFER SAFETY CONCERNS NOTED: SIT/STAND, REHAB EVAL
decreased weight-shifting ability/trunk, hips, and knees flexed/losing balance/decreased sequencing ability

## 2020-06-08 LAB
AMMONIA BLD-MCNC: 12 UMOL/L — SIGNIFICANT CHANGE UP (ref 11–55)
HCT VFR BLD CALC: 39.8 % — SIGNIFICANT CHANGE UP (ref 37–47)
HGB BLD-MCNC: 12.7 G/DL — SIGNIFICANT CHANGE UP (ref 12–16)
MCHC RBC-ENTMCNC: 31.3 PG — HIGH (ref 27–31)
MCHC RBC-ENTMCNC: 31.9 G/DL — LOW (ref 32–37)
MCV RBC AUTO: 98 FL — SIGNIFICANT CHANGE UP (ref 81–99)
NRBC # BLD: 0 /100 WBCS — SIGNIFICANT CHANGE UP (ref 0–0)
PLATELET # BLD AUTO: 163 K/UL — SIGNIFICANT CHANGE UP (ref 130–400)
RBC # BLD: 4.06 M/UL — LOW (ref 4.2–5.4)
RBC # FLD: 15.5 % — HIGH (ref 11.5–14.5)
TM INTERPRETATION: SIGNIFICANT CHANGE UP
WBC # BLD: 5.96 K/UL — SIGNIFICANT CHANGE UP (ref 4.8–10.8)
WBC # FLD AUTO: 5.96 K/UL — SIGNIFICANT CHANGE UP (ref 4.8–10.8)

## 2020-06-08 PROCEDURE — 71045 X-RAY EXAM CHEST 1 VIEW: CPT | Mod: 26

## 2020-06-08 PROCEDURE — 99233 SBSQ HOSP IP/OBS HIGH 50: CPT

## 2020-06-08 RX ORDER — FUROSEMIDE 40 MG
20 TABLET ORAL ONCE
Refills: 0 | Status: COMPLETED | OUTPATIENT
Start: 2020-06-08 | End: 2020-06-08

## 2020-06-08 RX ADMIN — Medication 50 MILLIGRAM(S): at 17:13

## 2020-06-08 RX ADMIN — CHLORHEXIDINE GLUCONATE 1 APPLICATION(S): 213 SOLUTION TOPICAL at 05:16

## 2020-06-08 RX ADMIN — Medication 20 MILLIGRAM(S): at 13:51

## 2020-06-08 RX ADMIN — AMIODARONE HYDROCHLORIDE 200 MILLIGRAM(S): 400 TABLET ORAL at 05:15

## 2020-06-08 RX ADMIN — Medication 50 MILLIGRAM(S): at 05:15

## 2020-06-08 RX ADMIN — Medication 5 MILLIGRAM(S): at 05:15

## 2020-06-08 RX ADMIN — Medication 5 MILLIGRAM(S): at 10:30

## 2020-06-08 RX ADMIN — Medication 10 MILLIGRAM(S): at 05:16

## 2020-06-08 RX ADMIN — PANTOPRAZOLE SODIUM 40 MILLIGRAM(S): 20 TABLET, DELAYED RELEASE ORAL at 05:15

## 2020-06-08 RX ADMIN — APIXABAN 5 MILLIGRAM(S): 2.5 TABLET, FILM COATED ORAL at 05:15

## 2020-06-08 NOTE — PROGRESS NOTE ADULT - ASSESSMENT
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 Last chemo was 4 prior to the last admission 5/16/20 when CTA chest revealed acute bilateral pulmonary emboli within the distal portion of the right main pulmonary artery and proximal left lower lobe arteries. Echocardiogram confirmed severe right heart strain. [facility] Patient was evaluated by IR, who deemed that the patient was not a candidate for embolectomy. An IVC filter was placed on 5/18/2020 and the patient was fully anticoagulated.       Assessment & Plan:    1. Pleural Effusion:  s/p Left Thoracentesis 6/5/20 with 1L of Serous fluid drained.  Exudative. Most likely due to Lymphoma.  CXR: Bilateral opacities, decreased on the left, with near-complete resolution of the left pleural effusion.  Follow up Cytology & Repeat CXR.      2. SVT: Resolved.  Cardiology following: Completed Amiodarone drip. Started on PO.   Continue Metoprolol.  ECHO: Normal LV Function.      3. Stage 4 Lymphoma:  On Immunotherapy. Family to speak with Oncologist today to discuss any further treatment.  Pain control.      4. DVT & PE s/p IVC filter:  Resumed Eliquis.      5. HTN:  Borderline low.   Off Lisinopril. Continue Lopressor for rate control.      6. Magnesium Deficiency:  Replaced. Monitor Level.      7. RA:  On Prednisone.      8. Debility:  Nutritional Support. PT as tolerated.        9. Poor PO intake:   Started on Marinol. Dietician following.   Continue Supplements. Encourage oral intake.  Gentle IV hydration.         Prophylaxis: Therapeutic Lovenox.  Code status: DNR/DNI    Progress Note Handoff:  Pending consults: None  Pending Tests: None  Family/Patient discussion: Plan of care discussed with Spouse Roman & Son-Law Dr. Felix . They want to continue Limited medical treatment but make her very comfortable too. Not ready for hospice care. They will discuss it with the oncologist at [facility] today and make a decision with regards to GOC.   Disposition: Unknown at this time.       Attending: Dr. Breanna Beatty. Spectra 1503. 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 Last chemo was 4 prior to the last admission 5/16/20 when CTA chest revealed acute bilateral pulmonary emboli within the distal portion of the right main pulmonary artery and proximal left lower lobe arteries. Echocardiogram confirmed severe right heart strain. Patient was admitted to the ICU at [facility]. Patient was evaluated by IR, who deemed that the patient was not a candidate for embolectomy. An IVC filter was placed on 5/18/2020 and the patient was fully anticoagulated.       Assessment & Plan:    1. Bilateral Pleural Effusion:  s/p Left Thoracentesis 6/5/20 with 1L of Serous fluid drained.  Exudative. Most likely due to Lymphoma.  CXR: Bilateral opacities, decreased on the left, with near-complete resolution of the left pleural effusion.  Follow up Cytology & Repeat CXR.  Trial of Lasix.      2. SVT: Resolved.  Cardiology following: Completed Amiodarone drip. Started on PO.   Continue Metoprolol.  ECHO: Normal LV Function.      3. Stage 4 Lymphoma:  On Immunotherapy. Family to speak with Oncologist today to discuss any further treatment.  Pain control.      4. DVT & PE s/p IVC filter:  Resumed Eliquis.      5. HTN:  Borderline low.   Off Lisinopril. Continue Lopressor for rate control.      6. Magnesium Deficiency:  Replaced. Monitor Level.      7. RA:  On Prednisone.      8. Debility:  Nutritional Support. PT as tolerated.        9. Poor PO intake:   Started on Marinol. Dietician following.   Continue Supplements. Encourage oral intake.      10. Encephalopathy:  CT head Unremarkable. ? Brain Metastases.  No infectious (All cultures negative) or metabolic etiology identified.  Follow up B12, Folate, Ammonia level. TSH wnl.        Prophylaxis: Therapeutic Lovenox.  Code status: DNR/DNI    Progress Note Handoff:  Pending consults: None  Pending Tests: None  Family/Patient discussion: Plan of care discussed with Spouse Roman & Son- Dr. Felix . They want to continue Limited medical treatment but make her very comfortable too. Not ready for hospice care. They will discuss it with the oncologist at [facility] today and make a decision with regards to GOC.   Disposition: Unknown at this time.       Attending: Dr. Breanna Beatty. Spectra 1503.

## 2020-06-08 NOTE — CHART NOTE - NSCHARTNOTEFT_GEN_A_CORE
Patient is a 80y old  Female who presents with a chief complaint of pleural effusion and diagnosed with SVT on Tele.   S/P Thoracocentesis  with Dr. Bassett on 06/05  Pt is DNR/DNI      Vitals  T(C): 36.2 (06-08-20 @ 13:36), Max: 36.6 (06-08-20 @ 04:40)  HR: 75 (06-08-20 @ 13:36) (70 - 79)  BP: 109/62 (06-08-20 @ 13:36) (88/60 - 134/64)  RR: 18 (06-08-20 @ 13:36) (18 - 18)  SpO2: 96% (06-07-20 @ 22:50) (96% - 96%)      Pt with minimal urine output   Multiple bladder scans have been performed within the last few days.   1 dose of furosemide was given   Will observe for the urine output       Results of Labs/Imaging discussed as well as patient's plan.    All patient's/family questions answered.   Patient and family encouraged to contact PA with any further issues.

## 2020-06-08 NOTE — PROGRESS NOTE ADULT - SUBJECTIVE AND OBJECTIVE BOX
CORA BRAMBILA  80y  Female    Patient is a 80y old Female who presents with a chief complaint of SOB (05 Jun 2020 08:39)      INTERVAL HPI/OVERNIGHT EVENTS:  Patient is confused. Still with poor PO intake and scanty urine out put.  No further runs of SVT. Off IV Amiodarone.       REVIEW OF SYSTEMS: UTO due to AMS.      VITALS:  T(C): 36.6 (08 Jun 2020 04:40), Max: 36.6 (08 Jun 2020 04:40)  T(F): 97.8 (08 Jun 2020 04:40), Max: 97.8 (08 Jun 2020 04:40)  HR: 79 (08 Jun 2020 04:40) (70 - 79)  BP: 134/64 (08 Jun 2020 04:40) (88/60 - 134/64)  BP(mean): --  RR: 18 (08 Jun 2020 04:40) (18 - 18)  SpO2: 96% (07 Jun 2020 22:50) (96% - 96%)-      PHYSICAL EXAM:  GENERAL: NAD, frail, well-developed  HEAD:  Atraumatic, Normocephalic  EYES: conjunctiva and sclera clear  ENMT: Moist mucous membranes  NECK: Supple, Normal thyroid  NERVOUS SYSTEM:  Awake & Alert but lethargic. Oriented x 1.  CHEST/LUNG: Rhonchi bilaterally; No rales, rhonchi, wheezing, or rubs  HEART: Regular rate and rhythm; No murmurs, rubs, or gallops  ABDOMEN: Soft, Nontender, Nondistended; Bowel sounds present  EXTREMITIES:  2+ Peripheral Pulses, No clubbing, cyanosis, or edema  LYMPH: No lymphadenopathy noted  SKIN: No rashes or lesions.    Consultant(s) Notes Reviewed:  [x ] YES  [ ] NO  Care Discussed with Consultants/Other Providers [ x] YES  [ ] NO    LABS:                        12.7   5.96  )-----------( 163      ( 08 Jun 2020 06:02 )             39.8     06-07    141  |  107  |  39<H>  ----------------------------<  91  3.8   |  18  |  0.9    Ca    8.8      07 Jun 2020 06:53    TPro  4.5<L>  /  Alb  2.8<L>  /  TBili  0.5  /  DBili  x   /  AST  26  /  ALT  12  /  AlkPhos  58  06-07      MICROBIOLOGY:  Culture - Body Fluid with Gram Stain (06.05.20 @ 09:20)    Gram Stain:   polymorphonuclear leukocytes seen  No organisms seen  by cytocentrifuge    Specimen Source: Pleural Fl Pleural Fluid      Culture - Blood in AM (06.02.20 @ 06:46)    Specimen Source: .Blood None    Culture Results:   No growth to date.      Culture - Urine (06.02.20 @ 05:50)    Specimen Source: .Urine Clean Catch (Midstream)    Culture Results:   <10,000 CFU/mL Normal Urogenital Awilda      RADIOLOGY & ADDITIONAL TESTS:  CT Chest No Cont (06.03.20 @ 12:00)   1. Extensive left axillary, supraclavicular and intrathoracic lymphadenopathy compatible with the patient's history of lymphoma.    2. Interlobular septal thickening and ground glass opacities, findings may represent underlying lymphangitic spread of tumor versus pulmonary edema. Correlation with fluid analysis is recommended.    3. Increased moderate to large bilateral pleural effusions with adjacent atelectasis.      CT Head No Cont (06.01.20 @ 15:54)   Unremarkable study.      X-ray Chest 1 View-PORTABLE IMMEDIATE (06.05.20 @ 10:18)   Bilateral opacities, decreased on the left, with near-complete resolution of the left pleural effusion.      X-ray Chest 1 View- PORTABLE-Urgent (06.06.20 @ 17:40)   Bilateral opacities unchanged. No pleural effusion or air leak      Imaging Personally Reviewed:  [x] YES  [ ] NO      MEDICATIONS  (STANDING):  aMIOdarone    Tablet 200 milliGRAM(s) Oral daily  aMIOdarone Infusion 1 mG/Min (33.3 mL/Hr) IV Continuous <Continuous>  aMIOdarone Infusion 0.5 mG/Min (16.7 mL/Hr) IV Continuous <Continuous>  apixaban 5 milliGRAM(s) Oral two times a day  chlorhexidine 4% Liquid 1 Application(s) Topical <User Schedule>  dronabinol 5 milliGRAM(s) Oral three times a day before meals  metoprolol tartrate 50 milliGRAM(s) Oral two times a day  pantoprazole    Tablet 40 milliGRAM(s) Oral before breakfast  predniSONE   Tablet 10 milliGRAM(s) Oral daily  sodium chloride 0.9%. 1000 milliLiter(s) (75 mL/Hr) IV Continuous <Continuous>      MEDICATIONS  (PRN):  acetaminophen   Tablet .. 650 milliGRAM(s) Oral every 6 hours PRN Temp greater or equal to 38C (100.4F), Mild Pain (1 - 3)  morphine  - Injectable 2 milliGRAM(s) IV Push every 4 hours PRN sob / mild pain / agitation        Home Medications:  lisinopril 20 mg oral tablet: 1 tab(s) orally once a day (19 May 2020 10:29)  metoprolol succinate 50 mg oral tablet, extended release: 1 tab(s) orally once a day (19 May 2020 10:29)  predniSONE 10 mg oral tablet: 1 tab(s) orally once a day (19 May 2020 10:29)  	      HEALTH ISSUES - PROBLEM Dx:  Pulmonary thromboembolism  Pleural effusion.  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 Still with poor PO intake and scanty urine out put.  No further runs of SVT. Off IV Amiodarone.       REVIEW OF SYSTEMS: UTO due to AMS.      VITALS:  T(C): 36.6 (08 Jun 2020 04:40), Max: 36.6 (08 Jun 2020 04:40)  T(F): 97.8 (08 Jun 2020 04:40), Max: 97.8 (08 Jun 2020 04:40)  HR: 79 (08 Jun 2020 04:40) (70 - 79)  BP: 134/64 (08 Jun 2020 04:40) (88/60 - 134/64)  BP(mean): --  RR: 18 (08 Jun 2020 04:40) (18 - 18)  SpO2: 96% (07 Jun 2020 22:50) (96% - 96%)-      PHYSICAL EXAM:  GENERAL: NAD, frail, well-developed  HEAD:  Atraumatic, Normocephalic  EYES: conjunctiva and sclera clear  ENMT: Moist mucous membranes  NECK: Supple, Normal thyroid  NERVOUS SYSTEM:  Awake & Alert but lethargic. Oriented x 1.  CHEST/LUNG: Rhonchi bilaterally; No rales, rhonchi, wheezing, or rubs  HEART: Regular rate and rhythm; No murmurs, rubs, or gallops  ABDOMEN: Soft, Nontender, Nondistended; Bowel sounds present  EXTREMITIES:  2+ Peripheral Pulses, No clubbing, cyanosis, or edema  LYMPH: No lymphadenopathy noted  SKIN: No rashes or lesions.    Consultant(s) Notes Reviewed:  [x ] YES  [ ] NO  Care Discussed with Consultants/Other Providers [ x] YES  [ ] NO    LABS:                        12.7   5.96  )-----------( 163      ( 08 Jun 2020 06:02 )             39.8     06-07    141  |  107  |  39<H>  ----------------------------<  91  3.8   |  18  |  0.9    Ca    8.8      07 Jun 2020 06:53    TPro  4.5<L>  /  Alb  2.8<L>  /  TBili  0.5  /  DBili  x   /  AST  26  /  ALT  12  /  AlkPhos  58  06-07      MICROBIOLOGY:  Culture - Body Fluid with Gram Stain (06.05.20 @ 09:20)    Gram Stain:   polymorphonuclear leukocytes seen  No organisms seen  by cytocentrifuge    Specimen Source: Pleural Fl Pleural Fluid      Culture - Blood in AM (06.02.20 @ 06:46)    Specimen Source: .Blood None    Culture Results:   No growth to date.      Culture - Urine (06.02.20 @ 05:50)    Specimen Source: .Urine Clean Catch (Midstream)    Culture Results:   <10,000 CFU/mL Normal Urogenital Awilda      RADIOLOGY & ADDITIONAL TESTS:  CT Chest No Cont (06.03.20 @ 12:00)   1. Extensive left axillary, supraclavicular and intrathoracic lymphadenopathy compatible with the patient's history of lymphoma.    2. Interlobular septal thickening and ground glass opacities, findings may represent underlying lymphangitic spread of tumor versus pulmonary edema. Correlation with fluid analysis is recommended.    3. Increased moderate to large bilateral pleural effusions with adjacent atelectasis.      CT Head No Cont (06.01.20 @ 15:54)   Unremarkable study.      X-ray Chest 1 View-PORTABLE IMMEDIATE (06.05.20 @ 10:18)   Bilateral opacities, decreased on the left, with near-complete resolution of the left pleural effusion.      X-ray Chest 1 View- PORTABLE-Urgent (06.06.20 @ 17:40)   Bilateral opacities unchanged. No pleural effusion or air leak      Xray Chest 1 View- PORTABLE-Urgent (06.08.20 @ 09:34)   Unchanged left basilar opacities and worsening right pleural effusion/opacities.      Imaging Personally Reviewed:  [x] YES  [ ] NO      MEDICATIONS  (STANDING):  aMIOdarone Tablet 200 milliGRAM(s) Oral daily  aMIOdarone Infusion 1 mG/Min (33.3 mL/Hr) IV Continuous <Continuous>  aMIOdarone Infusion 0.5 mG/Min (16.7 mL/Hr) IV Continuous <Continuous>  apixaban 5 milliGRAM(s) Oral two times a day  chlorhexidine 4% Liquid 1 Application(s) Topical <User Schedule>  dronabinol 5 milliGRAM(s) Oral three times a day before meals  metoprolol tartrate 50 milliGRAM(s) Oral two times a day  pantoprazole    Tablet 40 milliGRAM(s) Oral before breakfast  predniSONE   Tablet 10 milliGRAM(s) Oral daily  sodium chloride 0.9%. 1000 milliLiter(s) (75 mL/Hr) IV Continuous <Continuous>      MEDICATIONS  (PRN):  acetaminophen   Tablet .. 650 milliGRAM(s) Oral every 6 hours PRN Temp greater or equal to 38C (100.4F), Mild Pain (1 - 3)  morphine  - Injectable 2 milliGRAM(s) IV Push every 4 hours PRN sob / mild pain / agitation        Home Medications:  Lisinopril 20 mg oral tablet: 1 tab(s) orally once a day (19 May 2020 10:29)  Metoprolol succinate 50 mg oral tablet, extended release: 1 tab(s) orally once a day (19 May 2020 10:29)  predniSONE 10 mg oral tablet: 1 tab(s) orally once a day (19 May 2020 10:29)  	      HEALTH ISSUES - PROBLEM Dx:  Pulmonary thromboembolism  Pleural effusion.  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

## 2020-06-09 LAB
FOLATE SERPL-MCNC: 12.5 NG/ML — SIGNIFICANT CHANGE UP
HCT VFR BLD CALC: 44.6 % — SIGNIFICANT CHANGE UP (ref 37–47)
HGB BLD-MCNC: 14.1 G/DL — SIGNIFICANT CHANGE UP (ref 12–16)
MCHC RBC-ENTMCNC: 31.1 PG — HIGH (ref 27–31)
MCHC RBC-ENTMCNC: 31.6 G/DL — LOW (ref 32–37)
MCV RBC AUTO: 98.5 FL — SIGNIFICANT CHANGE UP (ref 81–99)
NRBC # BLD: 0 /100 WBCS — SIGNIFICANT CHANGE UP (ref 0–0)
PLATELET # BLD AUTO: 183 K/UL — SIGNIFICANT CHANGE UP (ref 130–400)
RBC # BLD: 4.53 M/UL — SIGNIFICANT CHANGE UP (ref 4.2–5.4)
RBC # FLD: 15.8 % — HIGH (ref 11.5–14.5)
VIT B12 SERPL-MCNC: >2000 PG/ML — HIGH (ref 232–1245)
WBC # BLD: 7.05 K/UL — SIGNIFICANT CHANGE UP (ref 4.8–10.8)
WBC # FLD AUTO: 7.05 K/UL — SIGNIFICANT CHANGE UP (ref 4.8–10.8)

## 2020-06-09 PROCEDURE — 99233 SBSQ HOSP IP/OBS HIGH 50: CPT

## 2020-06-09 RX ORDER — APIXABAN 2.5 MG/1
5 TABLET, FILM COATED ORAL
Refills: 0 | Status: DISCONTINUED | OUTPATIENT
Start: 2020-06-09 | End: 2020-06-12

## 2020-06-09 RX ORDER — MULTIVIT-MIN/FERROUS GLUCONATE 9 MG/15 ML
1 LIQUID (ML) ORAL DAILY
Refills: 0 | Status: DISCONTINUED | OUTPATIENT
Start: 2020-06-09 | End: 2020-06-12

## 2020-06-09 RX ORDER — METOPROLOL TARTRATE 50 MG
25 TABLET ORAL
Refills: 0 | Status: DISCONTINUED | OUTPATIENT
Start: 2020-06-09 | End: 2020-06-12

## 2020-06-09 RX ADMIN — Medication 5 MILLIGRAM(S): at 11:32

## 2020-06-09 RX ADMIN — MORPHINE SULFATE 2 MILLIGRAM(S): 50 CAPSULE, EXTENDED RELEASE ORAL at 06:03

## 2020-06-09 RX ADMIN — CHLORHEXIDINE GLUCONATE 1 APPLICATION(S): 213 SOLUTION TOPICAL at 05:27

## 2020-06-09 RX ADMIN — Medication 25 MILLIGRAM(S): at 18:29

## 2020-06-09 RX ADMIN — AMIODARONE HYDROCHLORIDE 200 MILLIGRAM(S): 400 TABLET ORAL at 05:29

## 2020-06-09 RX ADMIN — PANTOPRAZOLE SODIUM 40 MILLIGRAM(S): 20 TABLET, DELAYED RELEASE ORAL at 05:29

## 2020-06-09 RX ADMIN — Medication 60 MILLIGRAM(S): at 17:08

## 2020-06-09 RX ADMIN — Medication 1 TABLET(S): at 12:42

## 2020-06-09 RX ADMIN — APIXABAN 5 MILLIGRAM(S): 2.5 TABLET, FILM COATED ORAL at 17:24

## 2020-06-09 RX ADMIN — MORPHINE SULFATE 2 MILLIGRAM(S): 50 CAPSULE, EXTENDED RELEASE ORAL at 20:11

## 2020-06-09 RX ADMIN — Medication 50 MILLIGRAM(S): at 05:29

## 2020-06-09 RX ADMIN — Medication 5 MILLIGRAM(S): at 06:16

## 2020-06-09 RX ADMIN — Medication 10 MILLIGRAM(S): at 05:29

## 2020-06-09 NOTE — PROGRESS NOTE ADULT - SUBJECTIVE AND OBJECTIVE BOX
INTERVAL HPI/OVERNIGHT EVENTS:    SUBJECTIVE: Patient seen and examined at bedside.     unable to obtain ros    OBJECTIVE:    VITAL SIGNS:  Vital Signs Last 24 Hrs  T(C): 35.9 (09 Jun 2020 05:24), Max: 36.2 (08 Jun 2020 13:36)  T(F): 96.7 (09 Jun 2020 05:24), Max: 97.1 (08 Jun 2020 13:36)  HR: 79 (09 Jun 2020 08:38) (75 - 94)  BP: 110/55 (09 Jun 2020 05:24) (109/62 - 116/66)  BP(mean): --  RR: 97 (09 Jun 2020 08:38) (18 - 97)  SpO2: --      PHYSICAL EXAM:    General: NAD, confused  HEENT: NC/AT; PERRL, clear conjunctiva  Neck: supple  Respiratory: base crackles  Cardiovascular: +S1/S2; RRR  Abdomen: soft, NT/ND; +BS x4  Extremities: WWP, 2+ peripheral pulses b/l; no LE edema  Skin: normal color and turgor; no rash  Neurological:    MEDICATIONS:  MEDICATIONS  (STANDING):  aMIOdarone    Tablet 200 milliGRAM(s) Oral daily  chlorhexidine 4% Liquid 1 Application(s) Topical <User Schedule>  dronabinol 5 milliGRAM(s) Oral three times a day before meals  metoprolol tartrate 50 milliGRAM(s) Oral two times a day  multivitamin/minerals 1 Tablet(s) Oral daily  pantoprazole    Tablet 40 milliGRAM(s) Oral before breakfast  predniSONE   Tablet 10 milliGRAM(s) Oral daily    MEDICATIONS  (PRN):  acetaminophen   Tablet .. 650 milliGRAM(s) Oral every 6 hours PRN Temp greater or equal to 38C (100.4F), Mild Pain (1 - 3)  morphine  - Injectable 2 milliGRAM(s) IV Push every 4 hours PRN sob / mild pain / agitation      ALLERGIES:  Allergies    No Known Allergies    Intolerances        LABS:                        14.1   7.05  )-----------( 183      ( 09 Jun 2020 05:58 )             44.6     Hemoglobin: 14.1 g/dL (06-09 @ 05:58)  Hemoglobin: 12.7 g/dL (06-08 @ 06:02)  Hemoglobin: 12.2 g/dL (06-07 @ 06:53)  Hemoglobin: 12.6 g/dL (06-06 @ 05:54)  Hemoglobin: 12.4 g/dL (06-05 @ 06:23)    CBC Full  -  ( 09 Jun 2020 05:58 )  WBC Count : 7.05 K/uL  RBC Count : 4.53 M/uL  Hemoglobin : 14.1 g/dL  Hematocrit : 44.6 %  Platelet Count - Automated : 183 K/uL  Mean Cell Volume : 98.5 fL  Mean Cell Hemoglobin : 31.1 pg  Mean Cell Hemoglobin Concentration : 31.6 g/dL  Auto Neutrophil # : x  Auto Lymphocyte # : x  Auto Monocyte # : x  Auto Eosinophil # : x  Auto Basophil # : x  Auto Neutrophil % : x  Auto Lymphocyte % : x  Auto Monocyte % : x  Auto Eosinophil % : x  Auto Basophil % : x          Creatinine Trend: 0.9<--, 1.1<--, 1.1<--, 1.3<--, 1.5<--, 1.1<--        hs Troponin:              CSF:                      EKG:   MICROBIOLOGY:    IMAGING:      Labs, imaging, EKG personally reviewed    RADIOLOGY & ADDITIONAL TESTS: Reviewed.

## 2020-06-09 NOTE — CHART NOTE - NSCHARTNOTEFT_GEN_A_CORE
Registered Dietitian Follow-Up     Patient Profile Reviewed                           Yes [x]   No []     Nutrition History Previously Obtained        Yes []  No [x] confused pt, does not maintain arousal upon attempt at assessment       Pertinent Subjective Information: Pt asleep in bed but arouses upon RD calling her name. She is unable to provide any information with regards to po intake/GI s/s and falls back asleep. Pt noted to be confused/with AMS. RD observed at least 3 unopened bottles of Ensure Enlive supplements at bedside. Per EMR, po intake is ~25%. Calorie count was previously done on 6/5-- pt not meeting kcal/protein needs (ongoing).      Pertinent Medical Interventions: Pt is sp thoracentesis with 1L serous fluid drained, exudative likely 2/2 lymphoma. SVT resolved. Per GOC, pt/family not ready for Hospice and would like limited ongoing treatment. Marinol (appetite stimulant) initiated during this admission.      Diet order: Regular, Ensure Enlive TID     Anthropometrics:  - Ht. 63"  - Wt. dosing 62 kg/136 lbs; (6/6) 62.6 kg/138 lbs   - %wt change stable +/- 2lbs, continue to monitor  - BMI 24.2 using dosing/lowest recorded wt  - IBW 52.3 kg/115 lbs     Pertinent Lab Data: (6/7) BUN 39     Pertinent Meds: pacerone, marinol, morphine, lopressor, protonix, prednisone     Physical Findings:  - Appearance: well nourished; no edema noted  - GI function: last BM on 6/6, fecal incontinence noted  - Tubes: NA  - Oral/Mouth cavity: no chewing/swallowing difficulties noted.   - Skin: (6/9) ecchymosis     Nutrition Requirements  Weight Used: 62kg     Estimated Energy Needs    Continue [x]  600 - 2000kcal/day based on 1400-1600kcal/d (MSJ x 1.3-1.5) compared to 1860-2170kcal/day(30-35kcal/kg act wt)    Estimated Protein Needs    Continue [x]  74-87gm/day (1.2-1.4gm/kg act wt)      Estimated Fluid Needs        Continue [x]  1ml/1kcal or per LIP     Nutrient Intake: 25% (poor)        [x] Previous Nutrition Diagnosis: Inadequate energy intake            [x] Ongoing               Nutrition Intervention meals and snacks, vitamin/mineral supplements, coordination of care     Goal/Expected Outcome: pt to maintain po intake >50% over the next 3 days     Indicator/Monitoring: RD to monitor diet order, energy intake, body composition, NFPF    Recommendation: continue current diet order + supplements, continue with marinol, add a daily MVI, encourage po intake, provide assistance with meals PRN Registered Dietitian Follow-Up     Patient Profile Reviewed                           Yes [x]   No []     Nutrition History Previously Obtained        Yes []  No [x] confused pt, does not maintain arousal upon attempt at assessment       Pertinent Subjective Information: Pt asleep in bed but arouses upon RD calling her name. She is unable to provide any information with regards to po intake/GI s/s and falls back asleep. Pt noted to be confused/with AMS. RD observed at least 3 unopened bottles of Ensure Enlive supplements at bedside. Per EMR, po intake is ~25%. Calorie count was previously done on 6/5-- pt not meeting kcal/protein needs (ongoing).      Pertinent Medical Interventions: Pt is sp thoracentesis with 1L serous fluid drained, exudative likely 2/2 lymphoma. SVT resolved. Per GOC, pt/family not ready for Hospice and would like limited ongoing treatment. Marinol (appetite stimulant) initiated during this admission.      Diet order: Regular, Ensure Enlive TID     Anthropometrics:  - Ht. 63"  - Wt. dosing 62 kg/136 lbs; (6/6) 62.6 kg/138 lbs   - %wt change stable +/- 2lbs, continue to monitor  - BMI 24.2 using dosing/lowest recorded wt  - IBW 52.3 kg/115 lbs     Pertinent Lab Data: (6/7) BUN 39     Pertinent Meds: pacerone, marinol, morphine, lopressor, protonix, prednisone     Physical Findings:  - Appearance: well nourished; no edema noted  - GI function: last BM on 6/6, fecal incontinence noted  - Tubes: NA  - Oral/Mouth cavity: no chewing/swallowing difficulties noted.   - Skin: (6/9) ecchymosis     Nutrition Requirements  Weight Used: 62kg     Estimated Energy Needs    Continue [x]  600 - 2000kcal/day based on 1400-1600kcal/d (MSJ x 1.3-1.5) compared to 1860-2170kcal/day(30-35kcal/kg act wt)    Estimated Protein Needs    Continue [x]  74-87gm/day (1.2-1.4gm/kg act wt)      Estimated Fluid Needs        Continue [x]  1ml/1kcal or per LIP     Nutrient Intake: 25% (poor)        [x] Previous Nutrition Diagnosis: Inadequate energy intake            [x] Ongoing               Nutrition Intervention meals and snacks, vitamin/mineral supplements, coordination of care     Goal/Expected Outcome: pt to maintain po intake >50% over the next 3 days     Indicator/Monitoring: RD to monitor diet order, energy intake, body composition, NFPF    Recommendation: continue current diet order + supplements, continue with marinol, add a daily MVI, encourage po intake, provide assistance with meals PRN. Recs discussed with covering PA [id number]. Registered Dietitian Follow-Up     Patient Profile Reviewed                           Yes [x]   No []     Nutrition History Previously Obtained        Yes []  No [x] confused pt, does not maintain arousal upon attempt at assessment       Pertinent Subjective Information: Pt asleep in bed but arouses upon RD calling her name. She is unable to provide any information with regards to po intake/GI s/s and falls back asleep. Pt noted to be confused/with AMS. RD observed at least 3 unopened bottles of Ensure Enlive supplements at bedside. Per EMR, po intake is ~25%. Calorie count was previously done on 6/5-- pt not meeting kcal/protein needs (ongoing).      Pertinent Medical Interventions: Pt is sp thoracentesis with 1L serous fluid drained, exudative likely 2/2 lymphoma. SVT resolved. Per GOC, pt/family not ready for Hospice and would like limited ongoing treatment. Marinol (appetite stimulant) initiated during this admission.      Diet order: Regular, Ensure Enlive TID     Anthropometrics:  - Ht. 63"  - Wt. dosing 62 kg/136 lbs; (6/6) 62.6 kg/138 lbs   - %wt change stable +/- 2lbs, continue to monitor  - BMI 24.2 using dosing/lowest recorded wt  - IBW 52.3 kg/115 lbs     Pertinent Lab Data: (6/7) BUN 39     Pertinent Meds: pacerone, marinol, morphine, lopressor, protonix, prednisone     Physical Findings:  - Appearance: well nourished; no edema noted  - GI function: last BM on 6/6, fecal incontinence noted  - Tubes: NA  - Oral/Mouth cavity: no chewing/swallowing difficulties noted.   - Skin: (6/9) ecchymosis     Nutrition Requirements  Weight Used: 62kg     Estimated Energy Needs    Continue [x]  1600 - 2000kcal/day based on 1400-1600kcal/d (MSJ x 1.3-1.5) compared to 1860-2170kcal/day(30-35kcal/kg act wt)    Estimated Protein Needs    Continue [x]  74-87gm/day (1.2-1.4gm/kg act wt)      Estimated Fluid Needs        Continue [x]  1ml/1kcal or per LIP     Nutrient Intake: 25% (poor)        [x] Previous Nutrition Diagnosis: Inadequate energy intake            [x] Ongoing               Nutrition Intervention meals and snacks, vitamin/mineral supplements, coordination of care     Goal/Expected Outcome: pt to maintain po intake >50% over the next 3 days     Indicator/Monitoring: RD to monitor diet order, energy intake, body composition, NFPF    Recommendation: continue current diet order + supplements, continue with marinol, add a daily MVI, encourage po intake, provide assistance with meals PRN. Recs discussed with covering PA [phone].

## 2020-06-09 NOTE — PROGRESS NOTE ADULT - ASSESSMENT
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 SVT, started on amio. Acute hypoxic resp failure, found to have BL pleural effusions s/p L thora 6/5.    #Acute hypoxic resp failure  in setting of recent PE, BL pleural effusions, ggo on ct concerning for tumor spread  nc to keep spo2 >92  cyto noted  ongoing goc with family  resume eliquis  s/p ivc filter  f/u pulm  f/u outpt oncologist  #SVT  resolved, unclear underlying rythmn  amio 200 per cards  lopressor 50 bid  #HTN  lopressor as above  #RA  prednisone 10  #DVT ppx  eliquis    #Progress Note Handoff:  Pending (specify):  Consults_________, Tests________, Test Results_______, Other___goc______  Family discussion: to d/w further with family  Disposition: Home___/SNF___/Other________/Unknown at this time__x______ 80F PMHx rheumatoid arthritis, 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 SVT, started on amio. Acute hypoxic resp failure, found to have BL pleural effusions s/p L thora 6/5.    #Acute hypoxic resp failure  in setting of recent PE, BL pleural effusions, ggo on ct concerning for tumor spread  nc to keep spo2 >92  cyto noted  ongoing goc with family  resume eliquis  s/p ivc filter  f/u pulm  f/u outpt oncologist  #SVT  resolved, unclear underlying rythmn  amio 200 per cards  lopressor 50 bid  #HTN  lopressor as above  #RA  prednisone 10  #DVT ppx  eliquis    #Progress Note Handoff:  Pending (specify):  Consults_________, Tests________, Test Results_______, Other___goc______  Family discussion: to d/w further with family  Disposition: Home___/SNF___/Other________/Unknown at this time__x______

## 2020-06-10 LAB
ANION GAP SERPL CALC-SCNC: 19 MMOL/L — HIGH (ref 7–14)
ANION GAP SERPL CALC-SCNC: 20 MMOL/L — HIGH (ref 7–14)
B-OH-BUTYR SERPL-SCNC: 2.2 MMOL/L — HIGH
BUN SERPL-MCNC: 46 MG/DL — HIGH (ref 10–20)
BUN SERPL-MCNC: 49 MG/DL — HIGH (ref 10–20)
CALCIUM SERPL-MCNC: 10 MG/DL — SIGNIFICANT CHANGE UP (ref 8.5–10.1)
CALCIUM SERPL-MCNC: 10.1 MG/DL — SIGNIFICANT CHANGE UP (ref 8.5–10.1)
CHLORIDE SERPL-SCNC: 110 MMOL/L — SIGNIFICANT CHANGE UP (ref 98–110)
CHLORIDE SERPL-SCNC: 111 MMOL/L — HIGH (ref 98–110)
CO2 SERPL-SCNC: 13 MMOL/L — LOW (ref 17–32)
CO2 SERPL-SCNC: 14 MMOL/L — LOW (ref 17–32)
CREAT SERPL-MCNC: 1.3 MG/DL — SIGNIFICANT CHANGE UP (ref 0.7–1.5)
CREAT SERPL-MCNC: 1.4 MG/DL — SIGNIFICANT CHANGE UP (ref 0.7–1.5)
CULTURE RESULTS: SIGNIFICANT CHANGE UP
GLUCOSE SERPL-MCNC: 115 MG/DL — HIGH (ref 70–99)
GLUCOSE SERPL-MCNC: 124 MG/DL — HIGH (ref 70–99)
HCT VFR BLD CALC: 44.5 % — SIGNIFICANT CHANGE UP (ref 37–47)
HGB BLD-MCNC: 14 G/DL — SIGNIFICANT CHANGE UP (ref 12–16)
LACTATE SERPL-SCNC: 8.1 MMOL/L — CRITICAL HIGH (ref 0.7–2)
MAGNESIUM SERPL-MCNC: 1.7 MG/DL — LOW (ref 1.8–2.4)
MCHC RBC-ENTMCNC: 31 PG — SIGNIFICANT CHANGE UP (ref 27–31)
MCHC RBC-ENTMCNC: 31.5 G/DL — LOW (ref 32–37)
MCV RBC AUTO: 98.7 FL — SIGNIFICANT CHANGE UP (ref 81–99)
NON-GYNECOLOGICAL CYTOLOGY STUDY: SIGNIFICANT CHANGE UP
NRBC # BLD: 0 /100 WBCS — SIGNIFICANT CHANGE UP (ref 0–0)
PHOSPHATE SERPL-MCNC: 3.3 MG/DL — SIGNIFICANT CHANGE UP (ref 2.1–4.9)
PLATELET # BLD AUTO: 169 K/UL — SIGNIFICANT CHANGE UP (ref 130–400)
POTASSIUM SERPL-MCNC: 4.5 MMOL/L — SIGNIFICANT CHANGE UP (ref 3.5–5)
POTASSIUM SERPL-MCNC: 4.6 MMOL/L — SIGNIFICANT CHANGE UP (ref 3.5–5)
POTASSIUM SERPL-SCNC: 4.5 MMOL/L — SIGNIFICANT CHANGE UP (ref 3.5–5)
POTASSIUM SERPL-SCNC: 4.6 MMOL/L — SIGNIFICANT CHANGE UP (ref 3.5–5)
RBC # BLD: 4.51 M/UL — SIGNIFICANT CHANGE UP (ref 4.2–5.4)
RBC # FLD: 15.9 % — HIGH (ref 11.5–14.5)
SODIUM SERPL-SCNC: 143 MMOL/L — SIGNIFICANT CHANGE UP (ref 135–146)
SODIUM SERPL-SCNC: 144 MMOL/L — SIGNIFICANT CHANGE UP (ref 135–146)
SPECIMEN SOURCE: SIGNIFICANT CHANGE UP
WBC # BLD: 7.5 K/UL — SIGNIFICANT CHANGE UP (ref 4.8–10.8)
WBC # FLD AUTO: 7.5 K/UL — SIGNIFICANT CHANGE UP (ref 4.8–10.8)

## 2020-06-10 PROCEDURE — 99233 SBSQ HOSP IP/OBS HIGH 50: CPT

## 2020-06-10 RX ORDER — SODIUM CHLORIDE 9 MG/ML
1000 INJECTION, SOLUTION INTRAVENOUS
Refills: 0 | Status: DISCONTINUED | OUTPATIENT
Start: 2020-06-10 | End: 2020-06-12

## 2020-06-10 RX ORDER — MAGNESIUM SULFATE 500 MG/ML
1 VIAL (ML) INJECTION ONCE
Refills: 0 | Status: COMPLETED | OUTPATIENT
Start: 2020-06-10 | End: 2020-06-10

## 2020-06-10 RX ORDER — MORPHINE SULFATE 50 MG/1
2 CAPSULE, EXTENDED RELEASE ORAL
Refills: 0 | Status: DISCONTINUED | OUTPATIENT
Start: 2020-06-10 | End: 2020-06-12

## 2020-06-10 RX ADMIN — Medication 1 MILLIGRAM(S): at 23:22

## 2020-06-10 RX ADMIN — Medication 25 MILLIGRAM(S): at 05:44

## 2020-06-10 RX ADMIN — MORPHINE SULFATE 2 MILLIGRAM(S): 50 CAPSULE, EXTENDED RELEASE ORAL at 14:16

## 2020-06-10 RX ADMIN — SODIUM CHLORIDE 60 MILLILITER(S): 9 INJECTION, SOLUTION INTRAVENOUS at 09:32

## 2020-06-10 RX ADMIN — MORPHINE SULFATE 2 MILLIGRAM(S): 50 CAPSULE, EXTENDED RELEASE ORAL at 12:12

## 2020-06-10 RX ADMIN — Medication 1 TABLET(S): at 12:12

## 2020-06-10 RX ADMIN — APIXABAN 5 MILLIGRAM(S): 2.5 TABLET, FILM COATED ORAL at 05:44

## 2020-06-10 RX ADMIN — MORPHINE SULFATE 2 MILLIGRAM(S): 50 CAPSULE, EXTENDED RELEASE ORAL at 06:09

## 2020-06-10 RX ADMIN — Medication 60 MILLIGRAM(S): at 05:42

## 2020-06-10 RX ADMIN — Medication 100 GRAM(S): at 09:33

## 2020-06-10 RX ADMIN — MORPHINE SULFATE 2 MILLIGRAM(S): 50 CAPSULE, EXTENDED RELEASE ORAL at 00:29

## 2020-06-10 RX ADMIN — AMIODARONE HYDROCHLORIDE 200 MILLIGRAM(S): 400 TABLET ORAL at 05:44

## 2020-06-10 RX ADMIN — Medication 1 MILLIGRAM(S): at 14:56

## 2020-06-10 RX ADMIN — Medication 5 MILLIGRAM(S): at 12:12

## 2020-06-10 RX ADMIN — Medication 60 MILLIGRAM(S): at 18:21

## 2020-06-10 RX ADMIN — CHLORHEXIDINE GLUCONATE 1 APPLICATION(S): 213 SOLUTION TOPICAL at 05:42

## 2020-06-10 NOTE — GOALS OF CARE CONVERSATION - ADVANCED CARE PLANNING - NSPROPTRIGHTCAREGIVER_GEN_A_NUR
Santy admitted to not taking his novolog at meals when his blood sugars are in low 100's d/t fear of hypoglycemia.  He also admitted to not taking his novolog when he goes out to eat, or will take after he gets home.  We reviewed both of these.  We also reviewed the importance of eating consistent carbs at each meal again today.  Pt has medicare and has reached his 2 hours already this year so could you submit a second nutrition referral (cosigned by dr. Sheppard) so we can attempt another follow up please?  Thank you!
yes
yes

## 2020-06-10 NOTE — PROGRESS NOTE ADULT - SUBJECTIVE AND OBJECTIVE BOX
CORA BRAMBILA  80y  Female      Patient is a 80y old  Female who presents with a chief complaint of SOB (08 Jun 2020 08:33)      INTERVAL HPI/OVERNIGHT EVENTS:  Patient seen and examined earlier this morning. Patient appears to be in mild distress and mildly anxious. Patient is awake and but not oriented. Patient appears to be confused. Patient also present at bedside. GOC conversation took place and hospice was called; patient was made comfort care.       REVIEW OF SYSTEMS: Unable to obtain due to condition     T(C): 35.9 (06-10-20 @ 13:00), Max: 35.9 (06-09-20 @ 21:02)  HR: 182 (06-10-20 @ 14:54) (67 - 182)  BP: 112/70 (06-10-20 @ 14:54) (103/52 - 137/106)  RR: 16 (06-10-20 @ 14:54) (16 - 16)  SpO2: 98% (06-10-20 @ 06:35) (96% - 98%)    PHYSICAL EXAM:  GENERAL: mild distress, chronically ill appearing confused  HEAD:  Atraumatic, Normocephalic  EYES: PERRLA  ENMT: Moist mucous membranes  NECK: Supple, No JVD, Normal thyroid  NERVOUS SYSTEM:  awake, not completely alert, confused, not oriented, waxing and waning mental status; unable to obtain further neuro exam due to condition  CHEST/LUNG: +crackles in b/l lower lobes, +NC, in ni acute respiratory distress  HEART: Regular rate and rhythm; No murmurs, rubs, or gallops,   ABDOMEN: Soft, Nontender, Nondistended; Bowel sounds present  EXTREMITIES:  2+ Peripheral Pulses, No clubbing, cyanosis, or edema      Consultant(s) Notes Reviewed:  [x ] YES  [ ] NO  Care Discussed with Consultants/Other Providers [ x] YES  [ ] NO    LAB:                        14.0   7.50  )-----------( 169      ( 10 Kang 2020 05:46 )             44.5     06-10    143  |  110  |  49<H>  ----------------------------<  124<H>  4.5   |  13<L>  |  1.4    Ca    10.0      10 Kang 2020 11:18  Phos  3.3     06-10  Mg     1.7     06-10                    Drug Dosing Weight  Height (cm): 160.02 (02 Jun 2020 18:42)  Weight (kg): 62 (01 Jun 2020 20:22)  BMI (kg/m2): 24.2 (02 Jun 2020 18:42)  BSA (m2): 1.64 (02 Jun 2020 18:42)    CAPILLARY BLOOD GLUCOSE        I&O's Summary    09 Jun 2020 07:01  -  10 Kang 2020 07:00  --------------------------------------------------------  IN: 240 mL / OUT: 100 mL / NET: 140 mL          RADIOLOGY & ADDITIONAL TESTS:  Imaging Personally Reviewed:  [x] YES  [ ] NO    HEALTH ISSUES - PROBLEM Dx:  Pulmonary thromboembolism          MEDS:  acetaminophen   Tablet .. 650 milliGRAM(s) Oral every 6 hours PRN  aMIOdarone    Tablet 200 milliGRAM(s) Oral daily  apixaban 5 milliGRAM(s) Oral two times a day  chlorhexidine 4% Liquid 1 Application(s) Topical <User Schedule>  dextrose 5% + sodium chloride 0.45%. 1000 milliLiter(s) IV Continuous <Continuous>  dronabinol 5 milliGRAM(s) Oral three times a day before meals  LORazepam   Injectable 1 milliGRAM(s) IV Push every 6 hours PRN  methylPREDNISolone sodium succinate Injectable 60 milliGRAM(s) IV Push two times a day  metoprolol tartrate 25 milliGRAM(s) Oral two times a day  morphine  - Injectable 2 milliGRAM(s) IV Push every 2 hours PRN  multivitamin/minerals 1 Tablet(s) Oral daily  pantoprazole    Tablet 40 milliGRAM(s) Oral before breakfast

## 2020-06-10 NOTE — PROGRESS NOTE ADULT - ASSESSMENT
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 SVT, started on amio. Acute hypoxic resp failure, found to have BL pleural effusions s/p L thora 6/5.    #Comfort measures only, advanced GOC  -had GOC discussion with patient's  (HCP) Roman [phone] and patient's daughter at bedside; [place] [facility] [place]  -Family agreed to getting hospice evaluation  -family agreed for comfort measures only  -DNR/DNI  -Morphine IV Q2H for pain and dyspnea  -Ativan PRN for agitation/anxiety  -D51/2NS  -Patient do not want patient to be in any pain and do not want regular blood work, they do not want NGT placement, they do not want any escalation of care, no pressors, no HD  -Family okay with fingersticks  -Family okay with giving patient PO medications as tolerated    #Metabolic acidosis high AG likely 2/2 lactic acidosis in the setting of malignancy and possible starvation ketoacidosis  -no fever, no leukocytosis; does not appear to be septic  -will cont D51/2 NS  -will stop trending BMP, per family's wishes  -comfort measures only    #Acute hypoxic resp failure in setting of recent PE, BL pleural effusion  -ggo on ct concerning for tumor spread  -nc to keep spo2 >92  -cyto noted  PLAN  -ongoing goc with family  -resume eliquis  -s/p ivc filter  -f/u pulm  -f/u outpt oncologist    #SVT  -resolved, unclear underlying rythmn  PLAN  -amio 200 per cards  -lopressor 50 bid    #HTN  -lopressor as above    #RA  -prednisone 10    #DVT ppx  -eliquis    Progress Note Handoff  Pending Consults: hospice  Pending Tests: none  Pending Results: None  Family Discussion: Had discussion with patient's  (HCP) Roman [phone] and patient's daughter at bedside; discussed [place] [facility] [place]  Disposition: Home_____/SNF______/Other_____/Unknown at this time_____

## 2020-06-10 NOTE — GOALS OF CARE CONVERSATION - ADVANCED CARE PLANNING - CONVERSATION/DISCUSSION
Diagnosis/MOLST Discussed/Prognosis/Treatment Options
Prognosis/Treatment Options/Diagnosis/MOLST Discussed/Hospice Referral

## 2020-06-10 NOTE — GOALS OF CARE CONVERSATION - ADVANCED CARE PLANNING - CONVERSATION DETAILS
advanced lymphoma, pt not eating well, pulmonary embolism, SVT   SOB and pt discomfort  hospice option   will talk to other family members today and will let us know if he wants to proceed with hospice  for now DNR, DNI and no peg placement
advanced lymphoma, pt not eating well, pulmonary embolism, SVT   SOB and pt discomfort  hospice option   will talk to other family members today and will let us know if he wants to proceed with hospice  for now DNR, DNI and no peg placement    6/10/2020:  Comfort measures only, advanced GOC  -had GOC discussion with patient's  (HCP) Roman [phone] and patient's daughter at bedside; discussed with patient's outpatient oncologist from [facility] [place] Dr. Cardenas  -Family agreed to getting hospice evaluation  -family agreed for comfort measures only  -DNR/DNI  -Morphine IV Q2H for pain and dyspnea  -Ativan PRN for agitation/anxiety  -D51/2NS  -Patient do not want patient to be in any pain and do not want regular blood work, they do not want NGT placement, they do not want any escalation of care, no pressors, no HD  -Family okay with fingersticks  -Family okay with giving patient PO medications as tolerated

## 2020-06-11 LAB
HCT VFR BLD CALC: 41.1 % — SIGNIFICANT CHANGE UP (ref 37–47)
HGB BLD-MCNC: 13 G/DL — SIGNIFICANT CHANGE UP (ref 12–16)
MCHC RBC-ENTMCNC: 31.3 PG — HIGH (ref 27–31)
MCHC RBC-ENTMCNC: 31.6 G/DL — LOW (ref 32–37)
MCV RBC AUTO: 98.8 FL — SIGNIFICANT CHANGE UP (ref 81–99)
NRBC # BLD: 0 /100 WBCS — SIGNIFICANT CHANGE UP (ref 0–0)
PLATELET # BLD AUTO: 180 K/UL — SIGNIFICANT CHANGE UP (ref 130–400)
RBC # BLD: 4.16 M/UL — LOW (ref 4.2–5.4)
RBC # FLD: 16.1 % — HIGH (ref 11.5–14.5)
WBC # BLD: 7.97 K/UL — SIGNIFICANT CHANGE UP (ref 4.8–10.8)
WBC # FLD AUTO: 7.97 K/UL — SIGNIFICANT CHANGE UP (ref 4.8–10.8)

## 2020-06-11 PROCEDURE — 99233 SBSQ HOSP IP/OBS HIGH 50: CPT

## 2020-06-11 RX ADMIN — MORPHINE SULFATE 2 MILLIGRAM(S): 50 CAPSULE, EXTENDED RELEASE ORAL at 03:58

## 2020-06-11 RX ADMIN — MORPHINE SULFATE 2 MILLIGRAM(S): 50 CAPSULE, EXTENDED RELEASE ORAL at 13:29

## 2020-06-11 RX ADMIN — Medication 1 MILLIGRAM(S): at 14:09

## 2020-06-11 RX ADMIN — SODIUM CHLORIDE 60 MILLILITER(S): 9 INJECTION, SOLUTION INTRAVENOUS at 05:50

## 2020-06-11 RX ADMIN — Medication 60 MILLIGRAM(S): at 17:31

## 2020-06-11 RX ADMIN — CHLORHEXIDINE GLUCONATE 1 APPLICATION(S): 213 SOLUTION TOPICAL at 05:48

## 2020-06-11 RX ADMIN — Medication 60 MILLIGRAM(S): at 05:49

## 2020-06-11 RX ADMIN — SODIUM CHLORIDE 60 MILLILITER(S): 9 INJECTION, SOLUTION INTRAVENOUS at 19:45

## 2020-06-11 NOTE — PROGRESS NOTE ADULT - ASSESSMENT
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 SVT, started on amio. Acute hypoxic resp failure, found to have BL pleural effusions s/p L thora 6/5.    #Comfort measures only, advanced GOC  #Stage IV melanoma metastatic to lung  #AMS 2/2 metabolic encephalopathy in the setting of advanced metastatic disease  -had GOC discussion with patient's  (HCP) Roman [phone] on 6/10/2020 and patient's daughter at bedside; discussed with patient's outpatient oncologist from [facility] [place] Dr. Cardenas  -Family agreed to getting hospice evaluation; f/u further recs; possible home with hospice  -family agreed for comfort measures only  -DNR/DNI  -Morphine IV Q2H for pain and dyspnea  -Ativan PRN for agitation/anxiety  -D51/2NS  -Patient do not want patient to be in any pain and do not want regular blood work, they do not want NGT placement, they do not want any escalation of care, no pressors, no HD  -Family okay with fingersticks  -Family okay with giving patient PO medications as tolerated    #Metabolic acidosis high AG likely 2/2 lactic acidosis in the setting of malignancy and possible starvation ketoacidosis  -no fever, no leukocytosis; does not appear to be septic  -will cont D51/2 NS  -will stop trending BMP, per family's wishes  -comfort measures only    #Acute hypoxic resp failure in setting of recent PE, BL pleural effusion  -ggo on ct concerning for tumor spread  -nc to keep spo2 >92  -cyto noted  PLAN  -resume eliquis  -s/p ivc filter    #SVT  -resolved, unclear underlying rythmn  PLAN  -amio 200 per cards  -lopressor 50 bid    #HTN  -lopressor as above    #RA  -prednisone 10    #DVT ppx  -eliquis    Progress Note Handoff  Pending Consults: hospice  Pending Tests: none  Pending Results: None  Family Discussion: Had discussion with patient's  (HCP) Roman [phone] and patient's daughter at bedside; discussed with patient's outpatient oncologist from [facility] Dr. 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 SVT, started on amio. Acute hypoxic resp failure, found to have BL pleural effusions s/p L thora 6/5.    #Comfort measures only, advanced GOC  #Stage IV melanoma metastatic to lung  #AMS 2/2 Acute metabolic encephalopathy in the setting of multiorgan failure due to advanced metastatic disease  -had GOC discussion with patient's  (HCP) Roman [phone] on 6/10/2020 and patient's daughter at bedside; discussed with patient's outpatient oncologist from [facility] [place]. Cardenas  -Family agreed to getting hospice evaluation; f/u further recs; possible home with hospice  -family agreed for comfort measures only  -DNR/DNI  -Morphine IV Q2H for pain and dyspnea  -Ativan PRN for agitation/anxiety  -D51/2NS  -Patient do not want patient to be in any pain and do not want regular blood work, they do not want NGT placement, they do not want any escalation of care, no pressors, no HD  -Family okay with fingersticks  -Family okay with giving patient PO medications as tolerated    #Metabolic acidosis high AG likely 2/2 lactic acidosis in the setting of malignancy and possible starvation ketoacidosis  -no fever, no leukocytosis; does not appear to be septic  -will cont D51/2 NS  -will stop trending BMP, per family's wishes  -comfort measures only    #Acute hypoxic resp failure in setting of recent PE, BL pleural effusion  -ggo on ct concerning for tumor spread  -nc to keep spo2 >92  -cyto noted  PLAN  -resume eliquis  -s/p ivc filter    #SVT  -resolved, unclear underlying rythmn  PLAN  -amio 200 per cards  -lopressor 50 bid    #HTN  -lopressor as above    #RA  -prednisone 10    #DVT ppx  -eliquis    Progress Note Handoff  Pending Consults: hospice  Pending Tests: none  Pending Results: None  Family Discussion: Had discussion with patient's  (HCP) Roman [phone] and patient's daughter at bedside; discussed with patient's outpatient oncologist from [facility] [place] Dr. Cardenas  Disposition: Home__X___/SNF______/Other_____/Unknown at this time_____

## 2020-06-11 NOTE — PROGRESS NOTE ADULT - SUBJECTIVE AND OBJECTIVE BOX
CORA BRAMBILA  80y  Female      Patient is a 80y old  Female who presents with a chief complaint of SOB (08 Jun 2020 08:33)      INTERVAL HPI/OVERNIGHT EVENTS:  Patient seen and examined earlier this morning. Patient appears comfortable, no acute distress, no acute events overnight. Patient noted to be sleeping and resting comfortably.     REVIEW OF SYSTEMS: Unable to obtain due to condition     ICU Vital Signs Last 24 Hrs  T(C): 36 (11 Jun 2020 05:24), Max: 36.1 (10 Kang 2020 21:10)  T(F): 96.8 (11 Jun 2020 05:24), Max: 96.9 (10 Kang 2020 21:10)  HR: 90 (11 Jun 2020 06:34) (81 - 182)  BP: 127/82 (11 Jun 2020 06:34) (81/50 - 135/75)  BP(mean): --  ABP: --  ABP(mean): --  RR: 16 (11 Jun 2020 06:34) (16 - 16)  SpO2: --      PHYSICAL EXAM:  GENERAL: comfortable, chronically ill appearing confused  HEAD:  Atraumatic, Normocephalic  ENMT: Moist mucous membranes  NECK: Supple, No JVD  NERVOUS SYSTEM:  not alert or oriented; unable to obtain further neuro exam due to condition  CHEST/LUNG: +crackles in b/l lower lobes, +NC, in no acute respiratory distress  HEART: Regular rate and rhythm; No murmurs, rubs, or gallops  ABDOMEN: Soft, Nontender, Nondistended; Bowel sounds present  EXTREMITIES:  2+ Peripheral Pulses, No clubbing, cyanosis, or edema      Consultant(s) Notes Reviewed:  [x ] YES  [ ] NO  Care Discussed with Consultants/Other Providers [ x] YES  [ ] NO    LAB:                        14.0   7.50  )-----------( 169      ( 10 Kang 2020 05:46 )             44.5     06-10    143  |  110  |  49<H>  ----------------------------<  124<H>  4.5   |  13<L>  |  1.4    Ca    10.0      10 Kang 2020 11:18  Phos  3.3     06-10  Mg     1.7     06-10                    Drug Dosing Weight  Height (cm): 160.02 (02 Jun 2020 18:42)  Weight (kg): 62 (01 Jun 2020 20:22)  BMI (kg/m2): 24.2 (02 Jun 2020 18:42)  BSA (m2): 1.64 (02 Jun 2020 18:42)    CAPILLARY BLOOD GLUCOSE        I&O's Summary    09 Jun 2020 07:01  -  10 Kang 2020 07:00  --------------------------------------------------------  IN: 240 mL / OUT: 100 mL / NET: 140 mL          RADIOLOGY & ADDITIONAL TESTS:  Imaging Personally Reviewed:  [x] YES  [ ] NO    HEALTH ISSUES - PROBLEM Dx:  Pulmonary thromboembolism      MEDICATIONS  (STANDING):  aMIOdarone    Tablet 200 milliGRAM(s) Oral daily  apixaban 5 milliGRAM(s) Oral two times a day  chlorhexidine 4% Liquid 1 Application(s) Topical <User Schedule>  dextrose 5% + sodium chloride 0.45%. 1000 milliLiter(s) (60 mL/Hr) IV Continuous <Continuous>  dronabinol 5 milliGRAM(s) Oral three times a day before meals  methylPREDNISolone sodium succinate Injectable 60 milliGRAM(s) IV Push two times a day  metoprolol tartrate 25 milliGRAM(s) Oral two times a day  multivitamin/minerals 1 Tablet(s) Oral daily  pantoprazole    Tablet 40 milliGRAM(s) Oral before breakfast    MEDICATIONS  (PRN):  acetaminophen   Tablet .. 650 milliGRAM(s) Oral every 6 hours PRN Temp greater or equal to 38C (100.4F), Mild Pain (1 - 3)  LORazepam   Injectable 1 milliGRAM(s) IV Push every 6 hours PRN Agitation  morphine  - Injectable 2 milliGRAM(s) IV Push every 2 hours PRN sob / mild pain / agitation

## 2020-06-12 VITALS
SYSTOLIC BLOOD PRESSURE: 119 MMHG | RESPIRATION RATE: 18 BRPM | HEART RATE: 98 BPM | TEMPERATURE: 98 F | DIASTOLIC BLOOD PRESSURE: 62 MMHG

## 2020-06-12 LAB
HCT VFR BLD CALC: 41.6 % — SIGNIFICANT CHANGE UP (ref 37–47)
HGB BLD-MCNC: 13.1 G/DL — SIGNIFICANT CHANGE UP (ref 12–16)
MCHC RBC-ENTMCNC: 31.3 PG — HIGH (ref 27–31)
MCHC RBC-ENTMCNC: 31.5 G/DL — LOW (ref 32–37)
MCV RBC AUTO: 99.3 FL — HIGH (ref 81–99)
NRBC # BLD: 0 /100 WBCS — SIGNIFICANT CHANGE UP (ref 0–0)
PLATELET # BLD AUTO: 164 K/UL — SIGNIFICANT CHANGE UP (ref 130–400)
RBC # BLD: 4.19 M/UL — LOW (ref 4.2–5.4)
RBC # FLD: 16.1 % — HIGH (ref 11.5–14.5)
WBC # BLD: 7.94 K/UL — SIGNIFICANT CHANGE UP (ref 4.8–10.8)
WBC # FLD AUTO: 7.94 K/UL — SIGNIFICANT CHANGE UP (ref 4.8–10.8)

## 2020-06-12 PROCEDURE — 99239 HOSP IP/OBS DSCHRG MGMT >30: CPT

## 2020-06-12 RX ORDER — MORPHINE SULFATE 50 MG/1
5 CAPSULE, EXTENDED RELEASE ORAL
Qty: 0 | Refills: 0 | DISCHARGE
Start: 2020-06-12

## 2020-06-12 RX ORDER — AMIODARONE HYDROCHLORIDE 400 MG/1
1 TABLET ORAL
Qty: 0 | Refills: 0 | DISCHARGE
Start: 2020-06-12

## 2020-06-12 RX ORDER — DRONABINOL 2.5 MG
1 CAPSULE ORAL
Qty: 0 | Refills: 0 | DISCHARGE
Start: 2020-06-12

## 2020-06-12 RX ORDER — ACETAMINOPHEN 500 MG
2 TABLET ORAL
Qty: 0 | Refills: 0 | DISCHARGE
Start: 2020-06-12

## 2020-06-12 RX ADMIN — SODIUM CHLORIDE 60 MILLILITER(S): 9 INJECTION, SOLUTION INTRAVENOUS at 05:41

## 2020-06-12 RX ADMIN — Medication 1 MILLIGRAM(S): at 09:15

## 2020-06-12 RX ADMIN — MORPHINE SULFATE 2 MILLIGRAM(S): 50 CAPSULE, EXTENDED RELEASE ORAL at 08:41

## 2020-06-12 RX ADMIN — CHLORHEXIDINE GLUCONATE 1 APPLICATION(S): 213 SOLUTION TOPICAL at 05:28

## 2020-06-12 RX ADMIN — Medication 60 MILLIGRAM(S): at 05:29

## 2020-06-12 RX ADMIN — MORPHINE SULFATE 2 MILLIGRAM(S): 50 CAPSULE, EXTENDED RELEASE ORAL at 05:46

## 2020-06-12 NOTE — DISCHARGE NOTE PROVIDER - NSDCCPCAREPLAN_GEN_ALL_CORE_FT
PRINCIPAL DISCHARGE DIAGNOSIS  Diagnosis: Metastatic malignant melanoma  Assessment and Plan of Treatment: Comfort measures only; home with hospice; continue morphine and ativan per hospice recommendations; comfort feeding; PO medications as tolerated      SECONDARY DISCHARGE DIAGNOSES  Diagnosis: Failure to thrive in adult  Assessment and Plan of Treatment: Comfort measures    Diagnosis: AMS (altered mental status)  Assessment and Plan of Treatment: Comfort measures

## 2020-06-12 NOTE — PROGRESS NOTE ADULT - ASSESSMENT
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 SVT, started on amio. Acute hypoxic resp failure, found to have BL pleural effusions s/p L thora 6/5.    #Comfort measures only, advanced GOC  #Stage IV melanoma metastatic to lung  #AMS 2/2 Acute metabolic encephalopathy in the setting of multiorgan failure due to advanced metastatic disease  -had GOC discussion with patient's  (HCP) Roman [phone] on 6/10/2020 and patient's daughter at bedside; discussed with patient's outpatient oncologist from [facility] [place] Dr. Cardenas  -Plan for home hospice for today  -family agreed for comfort measures only  -DNR/DNI  -Morphine IV Q2H for pain and dyspnea  -Ativan PRN for agitation/anxiety  -D51/2NS    #Metabolic acidosis high AG likely 2/2 lactic acidosis in the setting of malignancy and possible starvation ketoacidosis  -no fever, no leukocytosis; does not appear to be septic  -will cont D51/2 NS  -comfort measures only    #Acute hypoxic resp failure in setting of recent PE, BL pleural effusion  -ggo on ct concerning for tumor spread  -nc to keep spo2 >92  -cyto noted  PLAN  -resume eliquis  -s/p ivc filter    #SVT  -resolved, unclear underlying rythmn  PLAN  -amio 200 per cards  -lopressor 50 bid    #HTN  -lopressor as above    #RA  -prednisone 10    #DVT ppx  -eliquis    Progress Note Handoff  Pending Consults: hospice  Pending Tests: none  Pending Results: None  Family Discussion: Had discussion with patient's  (HCP) Roman [phone] and patient's daughter at bedside; discussed with patient's outpatient oncologist from [facility] [place] Dr. Cardenas  Disposition: Home_____/SNF______/Other__Home with Hospice___/Unknown at this time_____

## 2020-06-12 NOTE — DISCHARGE NOTE PROVIDER - NSDCMRMEDTOKEN_GEN_ALL_CORE_FT
acetaminophen 325 mg oral tablet: 2 tab(s) orally every 6 hours, As needed, Temp greater or equal to 38C (100.4F), Mild Pain (1 - 3)  amiodarone 200 mg oral tablet: 1 tab(s) orally once a day  dronabinol 5 mg oral capsule: 1 cap(s) orally 3 times a day (before meals)  Eliquis Starter Pack for Treatment of DVT and PE 5 mg oral tablet: 2 tab(s) orally 2 times a day for 7 days; then 1 tab orally 2 times a day  lisinopril 20 mg oral tablet: 1 tab(s) orally once a day  LORazepam: 1 milligram(s) orally every 6 hours  metoprolol succinate 50 mg oral tablet, extended release: 1 tab(s) orally once a day  morphine: 5 milligram(s) orally every 4 hours

## 2020-06-12 NOTE — DISCHARGE NOTE PROVIDER - HOSPITAL COURSE
HPI:    UNABLE TO GET ANY HISTORY FROM PT.    CALLED  OVER THE PHONE.         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 for not been eating, weakness, altered mental status.        Family called EMS due to increase confusion over the past 3 days. At night time her mental status would be the worse.     Not ambulating at all, mainly in bed all day.         Last chemo was 4 weeks ago , prior to the last admission.     No longer taking methotrexate for her RA        ON LAST ADMISSION 3 WEEKS AGO :         CTA chest revealed acute bilateral pulmonary emboli within the distal portion of the right main pulmonary artery and proximal left lower lobe arteries. Echocardiogram confirmed severe right heart strain with RV/LV ration 3.2. Patient was admitted to the ICU at [facility]. Patient was evaluated by IR, who deemed that the patient was not a candidate for embolectomy. An IVC filter was placed on 5/18/2020 and the patient was fully anticoagulated. Outpatient MRI head 1 week prior to admission showed no evidence of brain metastases. Patient will be discharged on Eliquis. Patient now saturating 97% on room air while ambulating.        Hospital Course:    Patient is a 80F PMHx rheumatoid arthritis, stage iv melanoma metastatic to lung, PE s/p IVC filter, HTN here with altered mental status/ toxic metabolic encephalopathy, SVT, started on amio, Acute hypoxic resp failure, found to have BL pleural effusions s/p L thora 6/5. Patient had underwent thoracentesis; after further discussion with patient's outpatient oncologist, it was thought that patient's respiratory failure may have been contributed by pneumonitis caused by one of the chemotherapy. Patient was started on IV steroids without improvement. Patient's course was also complicated by development of SVT and was started on amiodarone. Patient was also found to have metabolic acidosis due to lactic acidosis which was thought to be due to malignancy and possible starvation ketoacidosis; patient was afebrile and had no leukocytosis; and did not appear to be septic. Patient's condition rapidly detoriated with a decline in her mental status thought to be due to acute metabolic encephalopathy in the setting of multiorgan failure due to advanced metastatic disease. After further goals of care (GOC discussion with patient's  (HCP) Roman [phone] on 6/10/2020 and patient's daughter at bedside; discussed with patient's outpatient oncologist from [facility] [place]. Cardenas), family agreed to a hospice evaluation. Patient was also made comfort measures. Family agreed to brining the patient home with home hospice services. At this time patient is being discharged to home with hospice.

## 2020-06-12 NOTE — PROGRESS NOTE ADULT - SUBJECTIVE AND OBJECTIVE BOX
CORA BRAMBILA  80y  Female      Patient is a 80y old  Female who presents with a chief complaint of SOB (08 Jun 2020 08:33)      INTERVAL HPI/OVERNIGHT EVENTS:  Patient seen and examined earlier this morning. Patient appears comfortable, no acute distress, no acute events overnight. Patient noted to be sleeping and resting comfortably. Transportation for pickup for home hospice arranged for 8:30AM.    REVIEW OF SYSTEMS: Unable to obtain due to condition         Vital Signs Last 24 Hrs  T(C): 36.8 (12 Jun 2020 05:00), Max: 36.8 (12 Jun 2020 05:00)  T(F): 98.2 (12 Jun 2020 05:00), Max: 98.2 (12 Jun 2020 05:00)  HR: 98 (12 Jun 2020 05:00) (95 - 98)  BP: 119/62 (12 Jun 2020 05:00) (119/62 - 158/62)  BP(mean): --  RR: 18 (12 Jun 2020 05:00) (18 - 20)  SpO2: --  PHYSICAL EXAM:  GENERAL: comfortable, chronically ill appearing confused  HEAD:  Atraumatic, Normocephalic  ENMT: Moist mucous membranes  NECK: Supple, No JVD  NERVOUS SYSTEM:  not alert or oriented; unable to obtain further neuro exam due to condition  CHEST/LUNG: +crackles in b/l lower lobes, +NC, in no acute respiratory distress  HEART: Regular rate and rhythm; No murmurs, rubs, or gallops  ABDOMEN: Soft, Nontender, Nondistended; Bowel sounds present  EXTREMITIES:  2+ Peripheral Pulses, No clubbing, cyanosis, or edema      Consultant(s) Notes Reviewed:  [x ] YES  [ ] NO  Care Discussed with Consultants/Other Providers [ x] YES  [ ] NO    LAB:                        14.0   7.50  )-----------( 169      ( 10 Kang 2020 05:46 )             44.5     06-10    143  |  110  |  49<H>  ----------------------------<  124<H>  4.5   |  13<L>  |  1.4    Ca    10.0      10 Kang 2020 11:18  Phos  3.3     06-10  Mg     1.7     06-10                    Drug Dosing Weight  Height (cm): 160.02 (02 Jun 2020 18:42)  Weight (kg): 62 (01 Jun 2020 20:22)  BMI (kg/m2): 24.2 (02 Jun 2020 18:42)  BSA (m2): 1.64 (02 Jun 2020 18:42)    CAPILLARY BLOOD GLUCOSE        I&O's Summary    09 Jun 2020 07:01  -  10 Kang 2020 07:00  --------------------------------------------------------  IN: 240 mL / OUT: 100 mL / NET: 140 mL          RADIOLOGY & ADDITIONAL TESTS:  Imaging Personally Reviewed:  [x] YES  [ ] NO    HEALTH ISSUES - PROBLEM Dx:  Pulmonary thromboembolism      MEDICATIONS  (STANDING):  aMIOdarone    Tablet 200 milliGRAM(s) Oral daily  apixaban 5 milliGRAM(s) Oral two times a day  chlorhexidine 4% Liquid 1 Application(s) Topical <User Schedule>  dextrose 5% + sodium chloride 0.45%. 1000 milliLiter(s) (60 mL/Hr) IV Continuous <Continuous>  dronabinol 5 milliGRAM(s) Oral three times a day before meals  methylPREDNISolone sodium succinate Injectable 60 milliGRAM(s) IV Push two times a day  metoprolol tartrate 25 milliGRAM(s) Oral two times a day  multivitamin/minerals 1 Tablet(s) Oral daily  pantoprazole    Tablet 40 milliGRAM(s) Oral before breakfast    MEDICATIONS  (PRN):  acetaminophen   Tablet .. 650 milliGRAM(s) Oral every 6 hours PRN Temp greater or equal to 38C (100.4F), Mild Pain (1 - 3)  LORazepam   Injectable 1 milliGRAM(s) IV Push every 6 hours PRN Agitation  morphine  - Injectable 2 milliGRAM(s) IV Push every 2 hours PRN sob / mild pain / agitation

## 2020-06-12 NOTE — DISCHARGE NOTE NURSING/CASE MANAGEMENT/SOCIAL WORK - NSCORESITESY/N_GEN_A_CORE_RD
Pt increasingly restless, making many attempt to get out of bed, pulling at lines and CPap mask. Precedex titrated from 0.2 to 1mcg/kg/hr from beginning of noc shift to this time. Pt also given two doses of Lorazepam. Pt now not maintaining Sp02 on Cpap- 88-89%. RT placed pt back on Bipap.   No

## 2020-06-12 NOTE — DISCHARGE NOTE NURSING/CASE MANAGEMENT/SOCIAL WORK - PATIENT PORTAL LINK FT
You can access the FollowMyHealth Patient Portal offered by [facility] by registering at the following website: [email or web address]. By joining [facility]’s FollowMyHealth portal, you will also be able to view your health information using other applications (apps) compatible with our system.

## 2020-06-17 NOTE — CDI QUERY NOTE - NSCDIOTHERTXTBX_GEN_ALL_CORE_HH
Dr. Cavazos,    Please review the documentation in this patient's medical record.  There is conflicting documentation concerning the patient's diagnosis.     Documentation in the H + P and several progress notes (6/2, 6/4, 6/5-6/9) indicate patient has stage 4 Lymphoma with mets to lung   Documentation in the progress notes 6/10-6/12 indicate patient has stage 4 melanoma with mets to lung.   Discharge note indicates:  81 y/o F with stage 4 lymphoma.  Hospital course:  "...Stage 4 melanoma metastatic to lung..."      Please clarify in the medical record whether the patient has:                Stage 4 lymphoma with mets to lung         Stage 4 melanoma with mets to lung    Thank you for your assistance,

## 2020-07-04 LAB
CULTURE RESULTS: SIGNIFICANT CHANGE UP
SPECIMEN SOURCE: SIGNIFICANT CHANGE UP

## 2022-09-22 NOTE — DISCHARGE NOTE NURSING/CASE MANAGEMENT/SOCIAL WORK - NSDCPEELIQUISFU_GEN_ALL_CORE
Let's go for a virtual visit if possible so that I can see the rash. If not possible, then ER   -can slot them in at 12:40 today     Go for blood tests as directed. Your doctor will do lab tests at regular visits to monitor the effects of this medicine. Please follow up with your doctor and keep your health care provider appointments.

## 2022-11-23 NOTE — CDI QUERY NOTE - NSCDIOTHERTXTBX_GEN_ALL_CORE_HH
Documentation:   79yo female with PMH of rheumatoid arthritis, stage IV lymphoma with metastases to lung on immunotherapy, and hypertension who presented to the [facility] site for nausea and dyspnea of 3 days duration. Found to have pulmonary embolism    Clinical indicators:  Creatinine, Serum: 1.2 mg/dL (05-18-20 @ 05:50)  Creatinine, Serum: 1.5 mg/dL (05-17-20 @ 04:48)  Creatinine, Serum: 1.6 mg/dL (05-16-20 @ 05:34)    Medical orders shows the pt received:  lactated ringers Bolus 2000 milliLiter(s) IV Bolus once  magnesium sulfate  IVPB 2 Gram(s) IV Intermittent Once    Based on your clinical evaluation of the patient, can you please specify the significance of the above findings:    • Non – significant lab findings  • KALEIGH  • Other (please specify)  • Unable  to determine Irritable/Anxious Irritable/Anxious Anxious Anxious Irritable/Anxious Anxious Irritable/Anxious Irritable/Anxious Irritable/Anxious

## 2024-04-15 NOTE — ED ADULT NURSE NOTE - CHPI ED NUR DURATION
Patient tolerated Venofer well today; no adverse reaction noted.  C/O chronic fatigue.  IV discontinued with catheter intact and pressure dressing applied to the site.  Has f/u appt(s) scheduled per MD request.  No questions or concerns voiced.  NAD noted upon discharge.      
today